# Patient Record
Sex: FEMALE | Race: WHITE | Employment: OTHER | URBAN - METROPOLITAN AREA
[De-identification: names, ages, dates, MRNs, and addresses within clinical notes are randomized per-mention and may not be internally consistent; named-entity substitution may affect disease eponyms.]

---

## 2017-05-11 DIAGNOSIS — H33.311 RETINAL TEAR OF RIGHT EYE: ICD-10-CM

## 2017-05-11 DIAGNOSIS — N20.0 KIDNEY STONES: ICD-10-CM

## 2017-05-11 DIAGNOSIS — I51.9: ICD-10-CM

## 2017-05-11 DIAGNOSIS — T14.8XXA HEMATOMA: ICD-10-CM

## 2017-08-08 PROBLEM — H26.9 BILATERAL CATARACTS: Status: ACTIVE | Noted: 2017-08-08

## 2017-08-08 PROBLEM — E87.5 HYPERKALEMIA: Status: ACTIVE | Noted: 2017-08-08

## 2017-08-08 PROBLEM — F32.A DEPRESSION: Status: ACTIVE | Noted: 2017-08-08

## 2017-08-08 PROBLEM — I48.0 PAF (PAROXYSMAL ATRIAL FIBRILLATION) (HCC): Status: ACTIVE | Noted: 2017-08-08

## 2017-08-08 PROBLEM — E66.01 MORBID OBESITY WITH BODY MASS INDEX OF 40.0-49.9 (HCC): Status: ACTIVE | Noted: 2017-08-08

## 2017-08-08 PROBLEM — Z80.0 FAMILY HISTORY OF COLON CANCER: Status: ACTIVE | Noted: 2017-08-08

## 2017-08-08 PROBLEM — K22.2 GERD WITH STRICTURE: Status: ACTIVE | Noted: 2017-08-08

## 2017-08-08 PROBLEM — M19.90 DJD (DEGENERATIVE JOINT DISEASE): Status: ACTIVE | Noted: 2017-08-08

## 2017-08-08 PROBLEM — E78.5 HYPERLIPEMIA: Status: ACTIVE | Noted: 2017-08-08

## 2017-08-08 PROBLEM — N32.81 OAB (OVERACTIVE BLADDER): Status: ACTIVE | Noted: 2017-08-08

## 2017-08-08 PROBLEM — I51.9 ORGANIC CARDIAC DISEASE: Status: ACTIVE | Noted: 2017-08-08

## 2017-08-08 PROBLEM — K21.9 GERD WITH STRICTURE: Status: ACTIVE | Noted: 2017-08-08

## 2017-08-08 PROBLEM — N20.0 KIDNEY STONES: Status: ACTIVE | Noted: 2017-08-08

## 2017-08-08 PROBLEM — R73.09 IMPAIRED GLUCOSE TOLERANCE TEST: Status: ACTIVE | Noted: 2017-08-08

## 2017-08-08 PROBLEM — I25.10 CAD (CORONARY ARTERY DISEASE): Status: ACTIVE | Noted: 2017-08-08

## 2017-08-08 PROBLEM — B02.9 HERPES ZOSTER: Status: ACTIVE | Noted: 2017-08-08

## 2017-08-08 PROBLEM — H33.311 RETINAL TEAR OF RIGHT EYE: Status: ACTIVE | Noted: 2017-08-08

## 2017-08-08 PROBLEM — L65.9 HAIR LOSS: Status: ACTIVE | Noted: 2017-08-08

## 2017-08-08 PROBLEM — E55.9 VITAMIN D DEFICIENCY: Status: ACTIVE | Noted: 2017-08-08

## 2017-08-08 PROBLEM — T14.8XXA HEMATOMA: Status: ACTIVE | Noted: 2017-08-08

## 2017-08-08 PROBLEM — R60.0 EDEMA EXTREMITIES: Status: ACTIVE | Noted: 2017-08-08

## 2017-08-08 RX ORDER — BISMUTH SUBSALICYLATE 262 MG
2 TABLET,CHEWABLE ORAL DAILY
COMMUNITY

## 2017-08-08 RX ORDER — BUMETANIDE 1 MG/1
1 TABLET ORAL DAILY
COMMUNITY
End: 2017-11-09

## 2017-08-08 RX ORDER — NALTREXONE HYDROCHLORIDE 50 MG/1
50 TABLET, FILM COATED ORAL DAILY
COMMUNITY
End: 2017-11-09

## 2017-08-08 RX ORDER — BUPROPION HYDROCHLORIDE 150 MG/1
150 TABLET, EXTENDED RELEASE ORAL 2 TIMES DAILY
COMMUNITY
End: 2017-10-10 | Stop reason: SDUPTHER

## 2017-08-08 RX ORDER — LANOLIN ALCOHOL/MO/W.PET/CERES
500 CREAM (GRAM) TOPICAL DAILY
COMMUNITY
End: 2017-11-09

## 2017-08-10 ENCOUNTER — APPOINTMENT (OUTPATIENT)
Dept: INTERNAL MEDICINE CLINIC | Age: 67
End: 2017-08-10

## 2017-08-16 PROBLEM — G47.33 OSA ON CPAP: Status: ACTIVE | Noted: 2017-08-16

## 2017-08-16 PROBLEM — Z99.89 OSA ON CPAP: Status: ACTIVE | Noted: 2017-08-16

## 2017-08-16 PROBLEM — E87.5 HYPERKALEMIA: Status: RESOLVED | Noted: 2017-08-08 | Resolved: 2017-08-16

## 2017-08-16 PROBLEM — N18.2 CKD (CHRONIC KIDNEY DISEASE) STAGE 2, GFR 60-89 ML/MIN: Status: ACTIVE | Noted: 2017-08-16

## 2017-09-11 RX ORDER — SIMVASTATIN 20 MG/1
TABLET, FILM COATED ORAL
Qty: 90 TAB | Refills: 2 | Status: SHIPPED | OUTPATIENT
Start: 2017-09-11 | End: 2018-06-07 | Stop reason: SDUPTHER

## 2017-10-10 RX ORDER — BUPROPION HYDROCHLORIDE 150 MG/1
TABLET, EXTENDED RELEASE ORAL
Qty: 60 TAB | Refills: 0 | Status: SHIPPED | OUTPATIENT
Start: 2017-10-10 | End: 2017-11-07 | Stop reason: SDUPTHER

## 2017-11-02 ENCOUNTER — LAB ONLY (OUTPATIENT)
Dept: INTERNAL MEDICINE CLINIC | Age: 67
End: 2017-11-02

## 2017-11-02 DIAGNOSIS — Z20.5 EXPOSURE TO HEPATITIS C: ICD-10-CM

## 2017-11-02 DIAGNOSIS — Z00.00 ROUTINE GENERAL MEDICAL EXAMINATION AT A HEALTH CARE FACILITY: Primary | ICD-10-CM

## 2017-11-02 DIAGNOSIS — R73.09 IMPAIRED GLUCOSE TOLERANCE TEST: ICD-10-CM

## 2017-11-02 DIAGNOSIS — E78.5 HYPERLIPIDEMIA, UNSPECIFIED HYPERLIPIDEMIA TYPE: ICD-10-CM

## 2017-11-02 LAB
ALBUMIN SERPL-MCNC: 3.9 G/DL (ref 3.9–5.4)
ALKALINE PHOS POC: 98 U/L (ref 38–126)
ALT SERPL-CCNC: 37 U/L (ref 9–52)
AST SERPL-CCNC: 22 U/L (ref 14–36)
BACTERIA UA POCT, BACTPOCT: NORMAL
BILIRUB UR QL STRIP: NEGATIVE
BUN BLD-MCNC: 27 MG/DL (ref 7–17)
CALCIUM BLD-MCNC: 9.5 MG/DL (ref 8.4–10.2)
CASTS UA POCT: NORMAL
CHLORIDE BLD-SCNC: 106 MMOL/L (ref 98–107)
CHOLEST SERPL-MCNC: 194 MG/DL (ref 0–200)
CK (CPK) POC: 42 U/L (ref 30–135)
CLUE CELLS, CLUEPOCT: NEGATIVE
CO2 POC: 27 MMOL/L (ref 22–32)
CREAT BLD-MCNC: 0.9 MG/DL (ref 0.7–1.2)
CRYSTALS UA POCT, CRYSPOCT: NEGATIVE
EGFR (POC): 65.7
EPITHELIAL CELLS POCT: NEGATIVE
GLUCOSE POC: 100 MG/DL (ref 65–105)
GLUCOSE UR-MCNC: NEGATIVE MG/DL
GRAN# POC: 4.6 K/UL (ref 2–7.8)
GRAN% POC: 68.9 % (ref 37–92)
HBA1C MFR BLD HPLC: 6 % (ref 4.5–5.7)
HCT VFR BLD CALC: 41.9 % (ref 37–51)
HDLC SERPL-MCNC: 60 MG/DL (ref 35–130)
HGB BLD-MCNC: 13.6 G/DL (ref 12–18)
IRON POC: 119 UG/DL (ref 37–170)
IRON SATURATION POC: 31 % (ref 15–55)
KETONES P FAST UR STRIP-MCNC: NEGATIVE MG/DL
LDL CHOLESTEROL POC: 89.2 MG/DL (ref 0–130)
LY# POC: 1.7 K/UL (ref 0.6–4.1)
LY% POC: 26.4 % (ref 10–58.5)
MCH RBC QN: 30 PG (ref 26–32)
MCHC RBC-ENTMCNC: 32.4 G/DL (ref 30–36)
MCV RBC: 93 FL (ref 80–97)
MID #, POC: 0.3 K/UL (ref 0–1.8)
MID% POC: 4.7 % (ref 0.1–24)
MUCUS UA POCT, MUCPOCT: NORMAL
PH UR STRIP: 6 [PH] (ref 5–7)
PLATELET # BLD: 266 K/UL (ref 140–440)
POTASSIUM SERPL-SCNC: 4.8 MMOL/L (ref 3.6–5)
PROT SERPL-MCNC: 6.9 G/DL (ref 6.3–8.2)
PROT UR QL STRIP: NEGATIVE MG/DL
RBC # BLD: 4.52 M/UL (ref 4.2–6.3)
RBC UA POCT, RBCPOCT: 0
SODIUM SERPL-SCNC: 144 MMOL/L (ref 137–145)
SP GR UR STRIP: 1.02 (ref 1.01–1.02)
TCHOL/HDL RATIO (POC): 3.2 (ref 0–4)
TIBC POC: 387 UG/DL (ref 265–497)
TOTAL BILIRUBIN POC: 0.4 MG/DL (ref 0.2–1.3)
TRICH UA POCT, TRICHPOC: NEGATIVE
TRIGL SERPL-MCNC: 224 MG/DL (ref 0–200)
TSH BLD-ACNC: 1.75 UIU/ML (ref 0.4–4.2)
UA UROBILINOGEN AMB POC: NORMAL (ref 0.2–1)
URINALYSIS CLARITY POC: CLEAR
URINALYSIS COLOR POC: NORMAL
URINE BLOOD POC: NEGATIVE
URINE CULT COMMENT, POCT: NORMAL
URINE LEUKOCYTES POC: NEGATIVE
URINE NITRITES POC: NEGATIVE
VITAMIN D POC: 52.1 NG/ML (ref 30–96)
VLDLC SERPL CALC-MCNC: 44.8 MG/DL
WBC # BLD: 6.6 K/UL (ref 4.1–10.9)
WBC UA POCT, WBCPOCT: 0
YEAST UA POCT, YEASTPOC: NEGATIVE

## 2017-11-03 LAB — HCV AB S/CO SERPL IA: <0.1 S/CO RATIO (ref 0–0.9)

## 2017-11-08 PROBLEM — R73.02 IGT (IMPAIRED GLUCOSE TOLERANCE): Status: ACTIVE | Noted: 2017-08-08

## 2017-11-08 PROBLEM — I44.7 LBBB (LEFT BUNDLE BRANCH BLOCK): Status: ACTIVE | Noted: 2017-11-08

## 2017-11-08 RX ORDER — BUPROPION HYDROCHLORIDE 150 MG/1
TABLET, EXTENDED RELEASE ORAL
Qty: 60 TAB | Refills: 0 | Status: SHIPPED | OUTPATIENT
Start: 2017-11-08 | End: 2017-11-09 | Stop reason: SDUPTHER

## 2017-11-09 ENCOUNTER — OFFICE VISIT (OUTPATIENT)
Dept: INTERNAL MEDICINE CLINIC | Age: 67
End: 2017-11-09

## 2017-11-09 VITALS
RESPIRATION RATE: 14 BRPM | TEMPERATURE: 98.2 F | HEART RATE: 78 BPM | SYSTOLIC BLOOD PRESSURE: 138 MMHG | OXYGEN SATURATION: 96 % | WEIGHT: 293 LBS | DIASTOLIC BLOOD PRESSURE: 76 MMHG | HEIGHT: 66 IN | BODY MASS INDEX: 47.09 KG/M2

## 2017-11-09 DIAGNOSIS — Z99.89 OSA ON CPAP: ICD-10-CM

## 2017-11-09 DIAGNOSIS — G47.33 OSA ON CPAP: ICD-10-CM

## 2017-11-09 DIAGNOSIS — N18.2 CKD (CHRONIC KIDNEY DISEASE) STAGE 2, GFR 60-89 ML/MIN: ICD-10-CM

## 2017-11-09 DIAGNOSIS — F32.9 REACTIVE DEPRESSION: ICD-10-CM

## 2017-11-09 DIAGNOSIS — I10 ESSENTIAL HYPERTENSION: ICD-10-CM

## 2017-11-09 DIAGNOSIS — R73.02 IGT (IMPAIRED GLUCOSE TOLERANCE): ICD-10-CM

## 2017-11-09 DIAGNOSIS — E78.5 HYPERLIPIDEMIA, UNSPECIFIED HYPERLIPIDEMIA TYPE: ICD-10-CM

## 2017-11-09 DIAGNOSIS — I44.7 LBBB (LEFT BUNDLE BRANCH BLOCK): ICD-10-CM

## 2017-11-09 DIAGNOSIS — E66.01 MORBID OBESITY WITH BMI OF 50.0-59.9, ADULT (HCC): Primary | ICD-10-CM

## 2017-11-09 RX ORDER — BUPROPION HYDROCHLORIDE 150 MG/1
150 TABLET, EXTENDED RELEASE ORAL 2 TIMES DAILY
Qty: 180 TAB | Refills: 1 | Status: SHIPPED | OUTPATIENT
Start: 2017-11-09 | End: 2018-06-07 | Stop reason: SDUPTHER

## 2017-11-09 RX ORDER — DIPHENHYDRAMINE HCL 25 MG
1 TABLET,DISINTEGRATING ORAL DAILY
COMMUNITY

## 2017-11-09 RX ORDER — LANOLIN ALCOHOL/MO/W.PET/CERES
500 CREAM (GRAM) TOPICAL DAILY
COMMUNITY

## 2017-11-09 NOTE — PROGRESS NOTES
Patient declines flu shot today. Reviewed record in preparation for visit and have obtained necessary documentation. Identified pt with two pt identifiers(name and ). Chief Complaint   Patient presents with    Complete Physical        Coordination of Care Questionnaire:  :     1) Have you been to an emergency room, urgent care clinic since your last visit? No    Hospitalized since your last visit? No            2) Have you seen or consulted any other health care providers outside of [address] [place] [address] since your last visit?  No

## 2017-11-09 NOTE — PROGRESS NOTES
Ms. Mortensen Son is a 75year old   female who comes to the office today for annual comprehensive personal healthcare examination. History of Present Illness: This patient has multiple medical problems. These include:  1. Hypertension. 2. Hyperlipidemia. 3. Morbid obesity, status post laparoscopic sleeve gastrectomy in June, 2016.  4. Obstructive sleep apnea on CPAP. 5. History of depression and OCD. 6. OHD with left bundle branch block. 7. Chronic kidney disease, stage 2.  8. CAD by EBT heart scan. 9. Family history of colon cancer. 10. History of OAB and issues with urge incontinence, improved recently. 11. Dysphagia associated with esophageal stricture, status post dilatation. 12. GERD. 13. History of right renal stone found on a workup for microscopic hematuria. Ultimately she underwent right ureteroscopy and laser lithotripsy with resolution of the problem. 14. Lower extremity edema associated with obesity, improved. 15. DJD, particularly involving knees and feet. 16. History of paroxysmal atrial fibrillation. 17. History of infected hematoma from a tractor accident, status post multiple surgical procedures and debridements. She developed significant scarring in her right groin area, but this has fully healed. 18. History of herpes zoster. 19. History of hair loss. 20. History of minimal cataracts. 21. History of retinal tear on the right, status post laser surgery. 22. History of vitamin D deficiency. At the time of the visit she was doing reasonably well. She had stalled in terms of her weight loss, however. She denied new cardiorespiratory, GI or  symptoms. Past Medical History:  Otherwise remarkable for supracervical hysterectomy and BSO and an appendectomy. Allergies:  None known. Current Medications:  1. Lisinopril 20 mg daily. 2. Aspirin 81 mg daily. 3. Multivitamins, two a day. 4. Vitamin B12 500 mcg daily.   5. Bupropion  mg twice a day.  6. Calcium and vitamin D daily. 7. Simvastatin 20 mg daily. 8. Metoprolol 50 mg twice a day. 9. Diclofenac 75 mg twice a day as needed. 10. Vitamin D 1,000 I U daily. Social History:  She is . She is retired. She does not smoke. She does not drink alcohol. She drinks 1-2 glasses of iced tea a day. Family History: Mother had sleep apnea, hypertension, Alzheimer's type dementia. She  at age 80. Father had colon cancer and  at age 79. Review of Systems:  CONSTITUTIONAL:  She denies fever, weight loss, sweats or fatigue. HEENT:  No blurred or double vision, headache or dizziness. No difficulty with swallowing, taste, speech or smell. RESPIRATORY:  No cough, wheezing or shortness of breath. No sputum production. CARDIAC:  Denies chest pain, palpitations, unexplained indigestion, syncope, edema, PND or orthopnea. GI:  No changes in bowel movements, no abdominal pain, no bloating, anorexia, nausea, vomiting or heartburn. BREASTS:  She does monthly self-breast examination. No masses have been felt. No nipple discharge noted. GYN:  Denies vaginal discharge or unexpected bleeding. :  Notes some urge incontinence. EXTREMITIES:  Bilateral knee pain, stiffness or swelling. Some intermittent swelling of the lower extremities as well. SKIN:  No recent rashes or mole changes. NEUROLOGICAL:  No numbness, tingling, burning paresthesias or loss of motor strength. No syncope, dizziness, frequent headaches or memory loss. PSYCH:  Mild depression, improved. Physical Examination:  GENERAL:  Obese white female, no acute distress. VITAL SIGNS:  BP: 138/76. P: 78 and regular. R: 14.  T: 98.2. HT: 5'6\". WT: 312 lbs. BMI: 50.4, (previously 56.7). VISION:  Deferred to ophthalmologist.  HEARING:  Pratik Neither. HEENT:  Ears:  The TMs and ear canals were clear. Eyes:  The pupillary responses were normal.  Extraocular muscle function intact. Lids and conjunctivae not injected. Fundoscopic exam revealed sharp disc margins. Pharynx:  Clear with teeth in good repair. No masses were noted. NECK:  Supple without thyromegaly or adenopathy. No JVD noted. No carotid bruits. LUNGS:  Clear to auscultation and percussion. CARDIAC:  Regular rate and rhythm without murmur. PMI not displaced. No gallop, rub or click. ABDOMEN:  Obese, soft, non-tender without palpable organomegaly or mass. No pulsatile mass was felt and no bruit was heard. Bowel sounds were active. Multiple well healed incisions. BREASTS:  Both symmetric. No palpable masses felt. No skin retractions noted. No nipple discharge evident. GYN:  Refused. RECTAL EXAM:  Refused. EXTREMITIES:  No clubbing, cyanosis or edema. Right groin has extensive scarring from the previous infected hematoma and extensive debridements. There is a concave depression in that area as well. Lower extremities reveal bilateral venous varicosities and moderate stasis changes. PULSES:  Dorsalis pedis and posterior tibial pulses felt without difficulty. SKIN:  No rash or unusual mole changes noted. LYMPH NODES:  None felt in the cervical, supraclavicular, axillary or inguinal region. NEUROLOGICAL:  Cranial nerves II-XII grossly intact. Motor strength 5/5. DTRs 2+ and symmetric. Station and gait normal.    Laboratory:  Hemoglobin is 13.6. White blood count is 6,600. Blood sugar is 100. Liver and kidney function are normal.  Electrolytes are normal.  Iron is 119. Hepatitis C antibody negative. TSH is 1.75. Urinalysis is clear. Vitamin D level is 52. 1. A1c 6.0. CK 42. Lipid profile reveals a total cholesterol of 194, triglycerides of 224, HDL cholesterol of 60 and an LDL of 89.2. In addition she brings in lab work obtained from Dr. William Love office, including a normal copper of 121, normal zinc of 74, normal folic acid greater than 20 and normal B12 of 1,581. Ferritin was also normal at 171. Parathyroid hormone was normal at 32. Prealbumin was normal at 34. Vitamin A normal at 91. Vitamin B1 normal at 227.5. Health Maintenance Issues and Ancillary Studies:  1. TDAP (pertussis and tetanus) vaccine was given in May, 2014. 2. Pneumovax 23 (PPSV23) was given in May, 2014. 3. Seasonal influenza vaccine was refused. 4. Shingles vaccine needs to be obtained from the pharmacy. 5. Prevnar 13 (PCV13) was given in October, 2015.  6. Last mammogram was negative in December, 2015 and is overdue. She said she would schedule one. 7. Lexiscan stress testing was negative in September, 2015.  8. Bone density in May, 2014 revealed a T score of -0.6 with a FRAX score of 6.2/0.3. We can probably repeat this at the five year interval.  9. Upper endoscopy in August, 2014 revealed a stricture which was dilated. Biopsies were negative for Kohli's esophagus and H. Pylori. 10. Colonoscopy was negative in August, 2014, (five year follow up for the family history of colon cancer). 11. EBT heart scan revealed a calcium score of 73 in March, 2012. 12. Echocardiogram was normal in April, 2008. 13. Eye examination in April, 2011 revealed early cataracts with residual scarring from laser treatment of the retinal tear. She's not have an eye examination since and I recommended she get one. 14. Last pelvic was negative in December, 2007 and she has declined any further. 15. CT scan of the abdomen in May, 2013 revealed the 9 mm stone in the right kidney. This was done at the urology center. 16. EKG in the office revealed left bundle branch block. 17. Pulmonary function studies revealed restrictive lung disease. 18. Health screening assessments were essentially normal.    Impression:  1. Hypertension. 2. Dyslipidemia. 3. Obstructive sleep apnea. 4. Morbid obesity, status post laparoscopic sleeve gastrectomy. 5. Depression and OCD. 6. CAD by EBT heart scan.   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 8. CKD stage 2.  9. GERD with history of stricture and dysphagia, status post dilatation. 10. Family history of colon cancer. 11. History of OAB and urge incontinence, less symptomatic recently. 12. DJD. 13. History of infected hematoma of the right groin, status post multiple surgical procedures. 14. Impaired glucose tolerance. 15. Vitamin D deficiency. 16. History of hair loss. 17. History of kidney stones. 18. Status post LALY, BSO, appendectomy and other surgeries. Plan:  1. Continue present medication. 2. Resume a more prudent diet and activity level and try to lose further weight. 3. Recheck here six months time per her wishes rather than three months. 4. She appears appropriately treated for her ten year coronary heart disease risk. 5. Five year breast cancer risk was calculated to be 1.4% versus the average for age group of 1.7%. Lifetime risk was calculated to be 7% versus 8% average for age group. 6. Ten year risk for major osteoporotic fracture was calculated to be 6.2% with risk for hip fracture 0.3%. 7. She needs to get mammograms and Zostavax.

## 2017-12-14 RX ORDER — LISINOPRIL 40 MG/1
TABLET ORAL
Qty: 90 TAB | Refills: 2 | Status: SHIPPED | OUTPATIENT
Start: 2017-12-14 | End: 2018-10-02

## 2018-01-11 ENCOUNTER — HOSPITAL ENCOUNTER (OUTPATIENT)
Dept: MAMMOGRAPHY | Age: 68
Discharge: HOME | End: 2018-01-11
Attending: INTERNAL MEDICINE
Payer: MEDICARE

## 2018-01-11 DIAGNOSIS — Z12.39 BREAST CANCER SCREENING: ICD-10-CM

## 2018-01-11 PROCEDURE — 77067 SCR MAMMO BI INCL CAD: CPT

## 2018-02-26 RX ORDER — DICLOFENAC SODIUM 75 MG/1
TABLET, DELAYED RELEASE ORAL
Qty: 180 TAB | Refills: 2 | Status: SHIPPED | OUTPATIENT
Start: 2018-02-26 | End: 2018-11-06

## 2018-03-09 ENCOUNTER — OFFICE VISIT (OUTPATIENT)
Dept: INTERNAL MEDICINE CLINIC | Age: 68
End: 2018-03-09

## 2018-03-09 VITALS
SYSTOLIC BLOOD PRESSURE: 139 MMHG | HEART RATE: 67 BPM | HEIGHT: 66 IN | DIASTOLIC BLOOD PRESSURE: 70 MMHG | OXYGEN SATURATION: 98 % | TEMPERATURE: 97.7 F
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
## 2018-03-09 RX ORDER — AMOXICILLIN AND CLAVULANATE POTASSIUM 875; 125 MG/1; MG/1
1 TABLET, FILM COATED ORAL EVERY 12 HOURS
Qty: 14 TAB | Refills: 0 | Status: SHIPPED | OUTPATIENT
Start: 2018-03-09 | End: 2018-10-01

## 2018-03-09 NOTE — PROGRESS NOTES
Subjective:     Ms. Lissette Eden comes to the office today having been scratched by her dog across her right shin. The duclaw opened up a linear skin tear with the central portion having been mostly abraded with no wound edges to approximate. The lateral aspect of the wound was approximated with steri-strips as it was not a deep scratch and I did not think it would accept sutures very well, particularly with her large legs and chronic edema. Vaseline dressing was applied after the Steri-Strips and antibiotic ointment as well. Dry sterile dressing was then applied to this for protection. She was instructed in wound care to consist of daily cleansing with normal saline or soap and water, followed by the application of antibiotic ointment and dry dressing. She will leave the Steri-Strips on until they fall off. If she has persistent bleeding she can put more of a compression bandage on it.   She will return if she feels it is worsening, but is not required to come back provided it continues to heal.        Patient Active Problem List    Diagnosis Date Noted    Hypertension      Priority: 1 - One    Hyperlipemia 08/08/2017     Priority: 1 - One    IGT (impaired glucose tolerance) 08/08/2017     Priority: 2 - Two    Morbid obesity with BMI of 50.0-59.9, adult ([facility] 08/08/2017     Priority: 2 - Two    LBBB (left bundle branch block) 11/08/2017     Priority: 3 - Three    MUMTAZ on CPAP 08/16/2017     Priority: 3 - Three    CKD (chronic kidney disease) stage 2, GFR 60-89 ml/min 08/16/2017     Priority: 3 - Three    OAB (overactive bladder) 08/08/2017     Priority: 3 - Three    PAF (paroxysmal atrial fibrillation) ([facility] 08/08/2017     Priority: 3 - Three    CAD (coronary artery disease) 08/08/2017     Priority: 3 - Three    Organic cardiac disease 08/08/2017     Priority: 3 - Three    DJD (degenerative joint disease) 08/08/2017     Priority: 3 - Three    GERD with stricture 08/08/2017     Priority: 3 - Three    Depression 08/08/2017     Priority: 3 - Three    Kidney stones 08/08/2017     Priority: 4 - Four    Family history of colon cancer 08/08/2017     Priority: 4 - Four    Edema extremities 08/08/2017     Priority: 4 - Four    Vitamin D deficiency 08/08/2017     Priority: 4 - Four    Retinal tear of right eye 08/08/2017     Priority: 5 - Five    Bilateral cataracts 08/08/2017     Priority: 5 - Five    Hematoma 08/08/2017     Priority: 5 - Five    Hair loss 08/08/2017     Priority: 5 - Five    Herpes zoster 08/08/2017     Priority: 5 - Five     Past Surgical History:   Procedure Laterality Date    COLORECTAL SCRN; HI RISK IND  8/11/2014         DILATE ESOPHAGUS  8/11/2014         HX APPENDECTOMY      HX HYSTERECTOMY      HX ORTHOPAEDIC Right     thigh/hip hematoma-4 times    HX SALPINGO-OOPHORECTOMY      HX UROLOGICAL  3/6/14    CYSTOSCOPY, RIGHT URETEROSCOPY WITH HIGH POWERED HOLMIUM LASER, WITH RIGHT URETERAL STENT PLACEMENT     IN EGD TRANSORAL BIOPSY SINGLE/MULTIPLE  8/11/2014           Social History   Substance Use Topics    Smoking status: Never Smoker    Smokeless tobacco: Never Used    Alcohol use No     No Known Allergies  Outpatient Prescriptions Marked as Taking for the 3/9/18 encounter (Office Visit) with Marilee Childers MD   Medication Sig Dispense Refill    amoxicillin-clavulanate (AUGMENTIN) 875-125 mg per tablet Take 1 Tab by mouth every twelve (12) hours. 14 Tab 0    diclofenac EC (VOLTAREN) 75 mg EC tablet take 1 tablet by mouth twice a day if needed 180 Tab 2    lisinopril (PRINIVIL, ZESTRIL) 40 mg tablet take 1 tablet by mouth once daily for blood pressure 90 Tab 2    calcium carbonate-vitamin D3 (CALTRATE WITH VITAMIN D3) 600 mg(1,500mg) -800 unit tab Take 1 Tab by mouth daily.  cyanocobalamin (VITAMIN B-12) 500 mcg tablet Take 500 mcg by mouth daily.  buPROPion SR (WELLBUTRIN SR) 150 mg SR tablet Take 1 Tab by mouth two (2) times a day.  180 Tab 1    simvastatin (ZOCOR) 20 mg tablet take 1 by mouth every evening for cholesterol 90 Tab 2    multivitamin (ONE A DAY) tablet Take 2 Tabs by mouth daily.  metoprolol (LOPRESSOR) 50 mg tablet Take 50 mg by mouth two (2) times a day.  cholecalciferol (VITAMIN D3) 1,000 unit tablet Take 1,000 Units by mouth daily.  aspirin delayed-release 81 mg tablet Take 81 mg by mouth daily. Review of Systems:  GEN: no weight loss, weight gain, fatigue or night sweats  CV: no PND, orthopnea, or palpitations  Resp: no dyspnea on exertion, no cough  Abd: no nausea, vomiting or diarrhea  Endocrine: no hair loss, excessive thirst or polyuria  Neurological ROS: no TIA or stroke symptoms      Objective:     Visit Vitals    /70    Pulse 67    Temp 97.7 °F (36.5 °C) (Oral)    Ht 5' 6\" (1.676 m)    SpO2 98%     General:   alert, cooperative and no distress   Eyes: conjunctivae/sclerae clear. PERRL   Mouth:  No oral lesions, no pharyngeal erythema, no exudates   Skin: Linear skin tear, dog scratch mid shin right. 10 cm and clean   Heart: S1 and S2 normal,no murmurs noted    Lungs: Clear to auscultation bilaterally, no increased work of breathing   Abdomen: Soft, nontender. Normal bowel sounds   Extremities: No edema or cyanosis                              Wound cleaned and steri-stripped. Tetanus UTD. Instructions in wound care given. Assessment/Plan:       ICD-10-CM ICD-9-CM    1. Dog scratch W54. 8XXA 919.0      E906.8        Orders Placed This Encounter    amoxicillin-clavulanate (AUGMENTIN) 875-125 mg per tablet     Sig: Take 1 Tab by mouth every twelve (12) hours. Dispense:  14 Tab     Refill:  0     Cleanse wound with NS/ soap and water and apply triple abx ointment daily    Follow-up Disposition:  Return for If sxs worsen or fail to improve; or as scheduled.

## 2018-03-09 NOTE — PROGRESS NOTES
Reviewed record in preparation for visit and have obtained necessary documentation. Identified pt with two pt identifiers(name and ). Chief Complaint   Patient presents with    Animal Bite        Coordination of Care Questionnaire:  :     1) Have you been to an emergency room, urgent care clinic since your last visit? No     Hospitalized since your last visit? No               2) Have you seen or consulted any other health care providers outside of [address] [place] [address] since your last visit? No    Patient states that her dog's toe nail cut her right lowe leg.

## 2018-05-15 RX ORDER — METOPROLOL TARTRATE 50 MG/1
TABLET ORAL
Qty: 180 TAB | Refills: 4 | Status: SHIPPED | OUTPATIENT
Start: 2018-05-15 | End: 2019-05-16 | Stop reason: SDUPTHER

## 2018-06-07 RX ORDER — BUPROPION HYDROCHLORIDE 150 MG/1
TABLET, EXTENDED RELEASE ORAL
Qty: 180 TAB | Refills: 1 | Status: SHIPPED | OUTPATIENT
Start: 2018-06-07 | End: 2018-12-02 | Stop reason: SDUPTHER

## 2018-06-07 RX ORDER — SIMVASTATIN 20 MG/1
TABLET, FILM COATED ORAL
Qty: 90 TAB | Refills: 2 | Status: SHIPPED | OUTPATIENT
Start: 2018-06-07 | End: 2019-03-12 | Stop reason: SDUPTHER

## 2018-09-28 ENCOUNTER — LAB ONLY (OUTPATIENT)
Dept: INTERNAL MEDICINE CLINIC | Age: 68
End: 2018-09-28

## 2018-09-28 DIAGNOSIS — R73.02 IMPAIRED GLUCOSE TOLERANCE: ICD-10-CM

## 2018-09-28 DIAGNOSIS — E78.5 HYPERLIPIDEMIA, UNSPECIFIED HYPERLIPIDEMIA TYPE: ICD-10-CM

## 2018-09-28 DIAGNOSIS — N39.0 URINARY TRACT INFECTION WITHOUT HEMATURIA, SITE UNSPECIFIED: ICD-10-CM

## 2018-09-28 DIAGNOSIS — Z00.00 ANNUAL PHYSICAL EXAM: Primary | ICD-10-CM

## 2018-09-28 LAB
ALBUMIN SERPL-MCNC: 3.7 G/DL (ref 3.9–5.4)
ALKALINE PHOS POC: 91 U/L (ref 38–126)
ALT SERPL-CCNC: 33 U/L (ref 9–52)
AMORPHOUS CRYSTALS,AMORC: NORMAL
AST SERPL-CCNC: 23 U/L (ref 14–36)
BACTERIA UA POCT, BACTPOCT: NORMAL
BILIRUB UR QL STRIP: NORMAL
BUN BLD-MCNC: 31 MG/DL (ref 7–17)
CALCIUM BLD-MCNC: 9.7 MG/DL (ref 8.4–10.2)
CASTS,CASTS: NORMAL
CHLORIDE BLD-SCNC: 105 MMOL/L (ref 98–107)
CHOLEST SERPL-MCNC: 151 MG/DL (ref 0–200)
CK (CPK) POC: 47 U/L (ref 30–135)
CO2 POC: 29 MMOL/L (ref 22–32)
CREAT BLD-MCNC: 1 MG/DL (ref 0.7–1.2)
CRYSTALS UA POCT, CRYSPOCT: NORMAL
EGFR (POC): 57.4
EPITHELIAL CELLS: NORMAL
GLUCOSE POC: 96 MG/DL (ref 65–105)
GLUCOSE UR-MCNC: NORMAL MG/DL
GRAN# POC: 5.9 K/UL (ref 2–7.8)
GRAN% POC: 82.7 % (ref 37–92)
HCT VFR BLD CALC: 39.6 % (ref 37–51)
HDLC SERPL-MCNC: 67 MG/DL (ref 35–130)
HGB BLD-MCNC: 13 G/DL (ref 12–18)
IRON POC: 72 UG/DL (ref 37–170)
IRON SATURATION POC: 25 % (ref 15–55)
KETONES P FAST UR STRIP-MCNC: NORMAL MG/DL
LDL CHOLESTEROL POC: 52.6 MG/DL (ref 0–130)
LY# POC: 0.9 K/UL (ref 0.6–4.1)
LY% POC: 13.5 % (ref 10–58.5)
MCH RBC QN: 30 PG (ref 26–32)
MCHC RBC-ENTMCNC: 32.8 G/DL (ref 30–36)
MCV RBC: 91 FL (ref 80–97)
MID #, POC: 0.2 K/UL (ref 0–1.8)
MID% POC: 3.8 % (ref 0.1–24)
OTHER,OTHU: NORMAL
PH UR STRIP: NORMAL [PH] (ref 5–7)
PLATELET # BLD: 251 K/UL (ref 140–440)
POTASSIUM SERPL-SCNC: 4.9 MMOL/L (ref 3.6–5)
PROT SERPL-MCNC: 6.6 G/DL (ref 6.3–8.2)
PROT UR QL STRIP: NORMAL
RBC # BLD: 4.33 M/UL (ref 4.2–6.3)
RBC #/AREA URNS HPF: NORMAL /[HPF]
SODIUM SERPL-SCNC: 144 MMOL/L (ref 137–145)
SP GR UR STRIP: NORMAL (ref 1.01–1.02)
TCHOL/HDL RATIO (POC): 2.3 (ref 0–4)
TIBC POC: 287 UG/DL (ref 265–497)
TOTAL BILIRUBIN POC: 0.7 MG/DL (ref 0.2–1.3)
TRIGL SERPL-MCNC: 157 MG/DL (ref 0–200)
TSH BLD-ACNC: 1.48 UIU/ML (ref 0.4–4.2)
UA UROBILINOGEN AMB POC: NORMAL (ref 0.2–1)
URINALYSIS CLARITY POC: NORMAL
URINALYSIS COLOR POC: NORMAL
URINE BLOOD POC: NORMAL
URINE LEUKOCYTES POC: NORMAL
URINE NITRITES POC: NORMAL
VITAMIN D POC: 54.7 NG/ML (ref 30–96)
VLDLC SERPL CALC-MCNC: 31.4 MG/DL
WBC # BLD: 7 K/UL (ref 4.1–10.9)
WBC URNS QL MICRO: NORMAL

## 2018-10-01 PROBLEM — Z00.00 PE (PHYSICAL EXAM), ANNUAL: Status: ACTIVE | Noted: 2018-10-01

## 2018-10-01 LAB — HBA1C MFR BLD HPLC: 5.2 % (ref 4.5–5.7)

## 2018-10-02 ENCOUNTER — OFFICE VISIT (OUTPATIENT)
Dept: INTERNAL MEDICINE CLINIC | Age: 68
End: 2018-10-02

## 2018-10-02 VITALS
HEIGHT: 66 IN | WEIGHT: 293 LBS | RESPIRATION RATE: 16 BRPM | BODY MASS INDEX: 47.09 KG/M2 | HEART RATE: 66 BPM | DIASTOLIC BLOOD PRESSURE: 72 MMHG | TEMPERATURE: 98.1 F | OXYGEN SATURATION: 98 % | SYSTOLIC BLOOD PRESSURE: 141 MMHG

## 2018-10-02 DIAGNOSIS — I25.84 CORONARY ARTERY DISEASE DUE TO CALCIFIED CORONARY LESION: ICD-10-CM

## 2018-10-02 DIAGNOSIS — I10 ESSENTIAL HYPERTENSION: ICD-10-CM

## 2018-10-02 DIAGNOSIS — I48.0 PAF (PAROXYSMAL ATRIAL FIBRILLATION) (HCC): ICD-10-CM

## 2018-10-02 DIAGNOSIS — M19.91 PRIMARY OSTEOARTHRITIS, UNSPECIFIED SITE: ICD-10-CM

## 2018-10-02 DIAGNOSIS — G47.33 OSA ON CPAP: ICD-10-CM

## 2018-10-02 DIAGNOSIS — I44.7 LBBB (LEFT BUNDLE BRANCH BLOCK): ICD-10-CM

## 2018-10-02 DIAGNOSIS — E78.5 HYPERLIPIDEMIA, UNSPECIFIED HYPERLIPIDEMIA TYPE: ICD-10-CM

## 2018-10-02 DIAGNOSIS — I25.10 CORONARY ARTERY DISEASE DUE TO CALCIFIED CORONARY LESION: ICD-10-CM

## 2018-10-02 DIAGNOSIS — F32.9 REACTIVE DEPRESSION: ICD-10-CM

## 2018-10-02 DIAGNOSIS — K21.9 GERD WITH STRICTURE: ICD-10-CM

## 2018-10-02 DIAGNOSIS — H26.9 CATARACT OF BOTH EYES, UNSPECIFIED CATARACT TYPE: ICD-10-CM

## 2018-10-02 DIAGNOSIS — R73.02 IGT (IMPAIRED GLUCOSE TOLERANCE): ICD-10-CM

## 2018-10-02 DIAGNOSIS — I73.9 PVD (PERIPHERAL VASCULAR DISEASE) (HCC): ICD-10-CM

## 2018-10-02 DIAGNOSIS — I87.2 STASIS DERMATITIS OF BOTH LEGS: ICD-10-CM

## 2018-10-02 DIAGNOSIS — N18.2 CKD (CHRONIC KIDNEY DISEASE) STAGE 2, GFR 60-89 ML/MIN: ICD-10-CM

## 2018-10-02 DIAGNOSIS — K22.2 GERD WITH STRICTURE: ICD-10-CM

## 2018-10-02 DIAGNOSIS — E55.9 VITAMIN D DEFICIENCY: ICD-10-CM

## 2018-10-02 DIAGNOSIS — I51.9: ICD-10-CM

## 2018-10-02 DIAGNOSIS — E66.01 MORBID OBESITY WITH BMI OF 50.0-59.9, ADULT (HCC): ICD-10-CM

## 2018-10-02 DIAGNOSIS — Z80.0 FAMILY HISTORY OF COLON CANCER: ICD-10-CM

## 2018-10-02 DIAGNOSIS — Z99.89 OSA ON CPAP: ICD-10-CM

## 2018-10-02 DIAGNOSIS — R60.0 EDEMA EXTREMITIES: ICD-10-CM

## 2018-10-02 DIAGNOSIS — N32.81 OAB (OVERACTIVE BLADDER): ICD-10-CM

## 2018-10-02 DIAGNOSIS — Z00.00 PE (PHYSICAL EXAM), ANNUAL: Primary | ICD-10-CM

## 2018-10-02 PROBLEM — F32.1 MODERATE MAJOR DEPRESSION (HCC): Status: ACTIVE | Noted: 2018-10-02

## 2018-10-02 LAB
BACTERIA UA POCT, BACTPOCT: ABNORMAL
BILIRUB UR QL STRIP: NEGATIVE
CASTS UA POCT: ABNORMAL
CLUE CELLS, CLUEPOCT: NEGATIVE
CRYSTALS UA POCT, CRYSPOCT: NEGATIVE
EPITHELIAL CELLS POCT: ABNORMAL
GLUCOSE UR-MCNC: NEGATIVE MG/DL
KETONES P FAST UR STRIP-MCNC: NEGATIVE MG/DL
MUCUS UA POCT, MUCPOCT: ABNORMAL
PH UR STRIP: 6 [PH] (ref 5–7)
PROT UR QL STRIP: NEGATIVE
RBC UA POCT, RBCPOCT: ABNORMAL
SP GR UR STRIP: 1.02 (ref 1.01–1.02)
TRICH UA POCT, TRICHPOC: NEGATIVE
UA UROBILINOGEN AMB POC: NORMAL (ref 0.2–1)
URINALYSIS CLARITY POC: CLEAR
URINALYSIS COLOR POC: YELLOW
URINE BLOOD POC: NEGATIVE
URINE CULT COMMENT, POCT: ABNORMAL
URINE LEUKOCYTES POC: NEGATIVE
URINE NITRITES POC: NEGATIVE
WBC UA POCT, WBCPOCT: ABNORMAL
YEAST UA POCT, YEASTPOC: NEGATIVE

## 2018-10-02 RX ORDER — LISINOPRIL 20 MG/1
TABLET ORAL DAILY
COMMUNITY
End: 2019-05-15 | Stop reason: SDUPTHER

## 2018-10-02 RX ORDER — TRIAMCINOLONE ACETONIDE 1 MG/G
CREAM TOPICAL 2 TIMES DAILY
Qty: 60 G | Refills: 1 | Status: SHIPPED | OUTPATIENT
Start: 2018-10-02 | End: 2019-02-05 | Stop reason: SDUPTHER

## 2018-10-02 RX ORDER — BUMETANIDE 1 MG/1
2 TABLET ORAL
COMMUNITY
End: 2018-11-13 | Stop reason: SDUPTHER

## 2018-10-02 NOTE — PROGRESS NOTES
Tiera Mcneil is a 70 y.o. female Chief Complaint Patient presents with  Complete Physical  
 
Visit Vitals  /72 (BP 1 Location: Left arm, BP Patient Position: Sitting)  Pulse 66  Temp 98.1 °F (36.7 °C) (Oral)  Resp 16  
 Ht 5' 6\" (1.676 m)  Wt 322 lb 12.8 oz (146.4 kg)  SpO2 98%  BMI 52.1 kg/m2 1. Have you been to the ER, urgent care clinic since your last visit? Hospitalized since your last visit?  no 
 
2. Have you seen or consulted any other health care providers outside of the [address] [place] [address] since your last visit? Include any pap smears or colon screening. No 
 
 
Patient  Refused a flu shot Patient refused a hearing test.
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
## 2018-10-04 LAB
BACTERIA UR CULT: ABNORMAL
BACTERIA UR CULT: ABNORMAL

## 2018-10-05 RX ORDER — CIPROFLOXACIN 250 MG/1
250 TABLET, FILM COATED ORAL 2 TIMES DAILY
Qty: 14 TAB | Refills: 0 | Status: SHIPPED | OUTPATIENT
Start: 2018-10-05 | End: 2018-10-12

## 2018-10-18 ENCOUNTER — HOSPITAL ENCOUNTER (OUTPATIENT)
Dept: VASCULAR SURGERY | Age: 68
Discharge: HOME | End: 2018-10-18
Attending: INTERNAL MEDICINE
Payer: MEDICARE

## 2018-10-18 DIAGNOSIS — I73.9 PVD (PERIPHERAL VASCULAR DISEASE) (HCC): ICD-10-CM

## 2018-10-18 PROCEDURE — 93922 UPR/L XTREMITY ART 2 LEVELS: CPT

## 2018-10-18 NOTE — PROCEDURES
[facility]  *** FINAL REPORT ***    Name: Buffy Pizano  MRN: [id number]    Outpatient  : 1950  HIS Order #: [id number]  [id number] [facility] Visit #: [id number]  Date: 18 Oct 2018    TYPE OF TEST: Peripheral Arterial Testing    REASON FOR TEST  PVD    Right Leg  PVR:        Normal  Ankle/Brachial:    Left Leg  PVR:        Normal  Ankle/Brachial:    INTERPRETATION/FINDINGS  PROCEDURE:  Ankle, brachial and digital arterial pressures, PVR  waveforms and digital PPG waveforms were performed. 1. Pulse Volume Recording (PVR) waveforms are normal bilaterally. 2. Distal arteries in both legs are non-compressible therefore the  ankle/brachial indexes may not accurately reflect the extent of  peripheral perfusion. 3. The right great toe/brachial index is 0.38 and the left great  toe/brachial index is 0.44. ADDITIONAL COMMENTS    I have personally reviewed the data relevant to the interpretation of  this  study.     TECHNOLOGIST: Delmi Delgado RVT  Signed: 10/18/2018 11:25 AM    PHYSICIAN: Sadaf John MD  Signed: 10/19/2018 02:25 PM

## 2018-10-22 ENCOUNTER — TELEPHONE (OUTPATIENT)
Dept: INTERNAL MEDICINE CLINIC | Age: 68
End: 2018-10-22

## 2018-10-22 NOTE — TELEPHONE ENCOUNTER
Patient returned call my call for labs results, and stated that she had fallen Saturday in injured her left knee, it's painful and swollen  Asking what can she take OTC for pain an swelling,and should she take he Diclofenac, and what else can she do. Patient advised that per Dr Chrystal Kong stop her Diclofenac and she can take 2 Aleve Bid and apply ice to help with swelling. If not better or gets worse call office back.

## 2018-11-06 ENCOUNTER — OFFICE VISIT (OUTPATIENT)
Dept: INTERNAL MEDICINE CLINIC | Age: 68
End: 2018-11-06

## 2018-11-06 VITALS
HEART RATE: 72 BPM | RESPIRATION RATE: 20 BRPM | BODY MASS INDEX: 47.09 KG/M2 | TEMPERATURE: 98.3 F | DIASTOLIC BLOOD PRESSURE: 84 MMHG | OXYGEN SATURATION: 98 % | SYSTOLIC BLOOD PRESSURE: 128 MMHG | WEIGHT: 293 LBS | HEIGHT: 66 IN

## 2018-11-06 DIAGNOSIS — I89.0 LYMPHEDEMA OF BOTH LOWER EXTREMITIES: ICD-10-CM

## 2018-11-06 DIAGNOSIS — I87.2 STASIS DERMATITIS OF BOTH LEGS: Primary | ICD-10-CM

## 2018-11-06 RX ORDER — CHOLECALCIFEROL (VITAMIN D3) 125 MCG
2 CAPSULE ORAL
COMMUNITY
End: 2018-12-06

## 2018-11-06 NOTE — PROGRESS NOTES
1. Have you been to the ER, urgent care clinic since your last visit? Hospitalized since your last visit? No    2. Have you seen or consulted any other health care providers outside of the [address] [place] [address] since your last visit? Include any pap smears or colon screening.  No    Chief Complaint   Patient presents with    Blood Pressure Check

## 2018-11-06 NOTE — PROGRESS NOTES
Subjective:   Hema Perez is a 70 y.o. female      Chief Complaint   Patient presents with    Blood Pressure Check        History of present illness:  Ms. Daria Bedolla returns in follow up. She has not noted much improvement in her lower extremity edema, nor the stasis dermatitis, with the increased dose of Bumex, although she has not always taking it every day. She will avoid taking it on the days that she goes out and does not take it when she gets home. Still, I am not sure that alone would have caused persistence of the problem. She does need to consider increasing her diuretic and continuing the steroid cream, plus getting compression hose. We will see how that works for the next month. If there is no improvement with those modalities she will need referral to the lymphedema clinic, which is really where I think we are headed. She denies any new cardiorespiratory, GI or  symptoms. She did have a fall recently and bruised her left leg and does have a small hematoma and bruise on the leg just below the knee, but nothing severe and she can ambulate and bear weight without difficulties. I do not think that needs further workup or treatment.         Patient Active Problem List    Diagnosis Date Noted    Hypertension      Priority: 1 - One    Hyperlipemia 08/08/2017     Priority: 1 - One    Lymphedema of both lower extremities 11/06/2018     Priority: 2 - Two    Stasis dermatitis of both legs 10/02/2018     Priority: 2 - Two    IGT (impaired glucose tolerance) 08/08/2017     Priority: 2 - Two    Morbid obesity with BMI of 50.0-59.9, adult ([facility] 08/08/2017     Priority: 2 - Two    LBBB (left bundle branch block) 11/08/2017     Priority: 3 - Three    MUMTAZ on CPAP 08/16/2017     Priority: 3 - Three    CKD (chronic kidney disease) stage 2, GFR 60-89 ml/min 08/16/2017     Priority: 3 - Three    OAB (overactive bladder) 08/08/2017     Priority: 3 - Three    PAF (paroxysmal atrial fibrillation) ([facility] 08/08/2017 Priority: 3 - Three    CAD (coronary artery disease) 08/08/2017     Priority: 3 - Three    Organic cardiac disease 08/08/2017     Priority: 3 - Three    DJD (degenerative joint disease) 08/08/2017     Priority: 3 - Three    GERD with stricture 08/08/2017     Priority: 3 - Three    Depression 08/08/2017     Priority: 3 - Three    PVD (peripheral vascular disease) ([facility] 10/02/2018     Priority: 4 - Four    Kidney stones 08/08/2017     Priority: 4 - Four    Family history of colon cancer 08/08/2017     Priority: 4 - Four    Edema extremities 08/08/2017     Priority: 4 - Four    Vitamin D deficiency 08/08/2017     Priority: 4 - Four    Retinal tear of right eye 08/08/2017     Priority: 5 - Five    Bilateral cataracts 08/08/2017     Priority: 5 - Five    Hematoma 08/08/2017     Priority: 5 - Five    Hair loss 08/08/2017     Priority: 5 - Five    Herpes zoster 08/08/2017     Priority: 5 - Five    Moderate major depression ([facility] 10/02/2018    PE (physical exam), annual 10/01/2018      Past Surgical History:   Procedure Laterality Date    COLORECTAL SCRN; HI RISK IND  8/11/2014         DILATE ESOPHAGUS  8/11/2014         HX APPENDECTOMY      HX HYSTERECTOMY      HX ORTHOPAEDIC Right     thigh/hip hematoma-4 times    HX OTHER SURGICAL      lap sleeve gastrectomy    HX SALPINGO-OOPHORECTOMY      HX UROLOGICAL  3/6/14    CYSTOSCOPY, RIGHT URETEROSCOPY WITH HIGH POWERED HOLMIUM LASER, WITH RIGHT URETERAL STENT PLACEMENT     SD EGD TRANSORAL BIOPSY SINGLE/MULTIPLE  8/11/2014           No Known Allergies   Family History   Problem Relation Age of Onset    Colon Cancer Father     Colon Cancer Paternal Grandmother     Dementia Mother     Hypertension Mother     Sleep Apnea Mother       Social History     Socioeconomic History    Marital status:      Spouse name: Not on file    Number of children: Not on file    Years of education: Not on file    Highest education level: Not on file Social Needs    Financial resource strain: Not on file    Food insecurity - worry: Not on file    Food insecurity - inability: Not on file   [employer] needs - medical: Not on file   [employer] needs - non-medical: Not on file   Occupational History    Not on file   Tobacco Use    Smoking status: Never Smoker    Smokeless tobacco: Never Used   Substance and Sexual Activity    Alcohol use: No    Drug use: No    Sexual activity: Not on file   Other Topics Concern    Not on file   Social History Narrative    Not on file     Outpatient Medications Marked as Taking for the 11/6/18 encounter (Office Visit) with Lashaun Amaro MD   Medication Sig Dispense Refill    naproxen sodium (ALEVE) 220 mg cap Take 2 Tabs by mouth two (2) times daily as needed.  lisinopril (PRINIVIL, ZESTRIL) 20 mg tablet Take  by mouth daily.  triamcinolone acetonide (KENALOG) 0.1 % topical cream Apply  to affected area two (2) times a day. use thin layer 60 g 1    bumetanide (BUMEX) 1 mg tablet Take 2 mg by mouth daily as needed.  simvastatin (ZOCOR) 20 mg tablet take 1 tablet by mouth every evening for cholesterol 90 Tab 2    buPROPion SR (WELLBUTRIN SR) 150 mg SR tablet take 1 tablet by mouth twice a day 180 Tab 1    metoprolol tartrate (LOPRESSOR) 50 mg tablet take 1 tablet by mouth twice a day 180 Tab 4    calcium carbonate-vitamin D3 (CALTRATE WITH VITAMIN D3) 600 mg(1,500mg) -800 unit tab Take 1 Tab by mouth daily.  cyanocobalamin (VITAMIN B-12) 500 mcg tablet Take 500 mcg by mouth daily.  multivitamin (ONE A DAY) tablet Take 2 Tabs by mouth daily.  aspirin delayed-release 81 mg tablet Take 81 mg by mouth daily. Review of Systems              Constitutional:  She denies fever, weight loss, sweats or fatigue. HEENT:  No blurred or double vision, headache or dizziness. No difficulty with swallowing, taste, speech or smell.   Respiratory:  No cough, wheezing or shortness of breath. No sputum production. Cardiac:  Denies chest pain, palpitations, unexplained indigestion, syncope, edema, PND or orthopnea. GI:  No changes in bowel movements, no abdominal pain, no bloating, anorexia, nausea, vomiting or heartburn. :  No frequency or dysuria. Denies incontinence. Extremities:  No joint pain, stiffness or swelling. Skin:  No recent rashes or mole changes. Neurological:  No numbness, tingling, burning paresthesias or loss of motor strength. No syncope, dizziness, frequent headaches or memory loss. Objective:     Vitals:    11/06/18 1344   BP: 128/84   Pulse: 72   Resp: 20   Temp: 98.3 °F (36.8 °C)   TempSrc: Oral   SpO2: 98%   Weight: 321 lb 9.6 oz (145.9 kg)   Height: 5' 6\" (1.676 m)   PainSc:   9   PainLoc: Knee       Body mass index is 51.91 kg/m². Physical Examination:              General Appearance:  Well-developed, well-nourished, no acute  distress. HEENT:     Ears:  The TMs and ear canals were clear. Eyes:  The pupillary responses were normal.  Extraocular muscle function intact. Lids and conjunctiva not injected. Neck:  Supple without thyromegaly or adenopathy. No JVD noted. Lungs:  Clear to auscultation and percussion. Cardiac:  Regular rate and rhythm without murmur. GI: nontender w/o mass. Normal BS's. Extremities:  Lymphedema with stasis dermatitis below knees. Neurological:  Grossly normal.            Assessment/Plan:   Impressions:      ICD-10-CM ICD-9-CM    1. Stasis dermatitis of both legs V54.2 403.7 METABOLIC PANEL, BASIC   2. Lymphedema of both lower extremities X39.9 725.7 METABOLIC PANEL, BASIC        Plan:  1. Continue present meds with increase Bumex  2. Discussed lifestyle modifications including Na restriction, low carb/fat diet, weight reduction and exercise (at least a walking program). Follow-up Disposition:  Return in about 1 month (around 12/6/2018).       Orders Placed This Encounter    METABOLIC Lynda Montano MD

## 2018-11-07 LAB
BUN SERPL-MCNC: 31 MG/DL (ref 8–27)
BUN/CREAT SERPL: 26 (ref 12–28)
CALCIUM SERPL-MCNC: 10.3 MG/DL (ref 8.7–10.3)
CHLORIDE SERPL-SCNC: 103 MMOL/L (ref 96–106)
CO2 SERPL-SCNC: 28 MMOL/L (ref 20–29)
CREAT SERPL-MCNC: 1.17 MG/DL (ref 0.57–1)
GLUCOSE SERPL-MCNC: 99 MG/DL (ref 65–99)
POTASSIUM SERPL-SCNC: 5.5 MMOL/L (ref 3.5–5.2)
SODIUM SERPL-SCNC: 144 MMOL/L (ref 134–144)

## 2018-11-13 RX ORDER — BUMETANIDE 1 MG/1
TABLET ORAL
Qty: 90 TAB | Refills: 1 | Status: SHIPPED | OUTPATIENT
Start: 2018-11-13 | End: 2018-12-06

## 2018-11-13 NOTE — TELEPHONE ENCOUNTER
Patient Last Seen:  11- with labs    Last labs done: NA    Next appointment:  12-      Requested Prescriptions     Pending Prescriptions Disp Refills    bumetanide (BUMEX) 1 mg tablet [Pharmacy Med Name: BUMETANIDE 1 MG TABLET] 90 Tab 1     Sig: take 1 tablet by mouth once daily

## 2018-11-15 ENCOUNTER — LAB ONLY (OUTPATIENT)
Dept: INTERNAL MEDICINE CLINIC | Age: 68
End: 2018-11-15

## 2018-11-15 DIAGNOSIS — N18.2 CKD (CHRONIC KIDNEY DISEASE) STAGE 2, GFR 60-89 ML/MIN: Primary | ICD-10-CM

## 2018-11-15 DIAGNOSIS — E87.5 HYPERKALEMIA: ICD-10-CM

## 2018-11-15 LAB — POTASSIUM SERPL-SCNC: 5 MMOL/L (ref 3.6–5)

## 2018-12-03 RX ORDER — BUPROPION HYDROCHLORIDE 150 MG/1
TABLET, EXTENDED RELEASE ORAL
Qty: 180 TAB | Refills: 1 | Status: SHIPPED | OUTPATIENT
Start: 2018-12-03 | End: 2019-05-15 | Stop reason: SDUPTHER

## 2018-12-06 ENCOUNTER — OFFICE VISIT (OUTPATIENT)
Dept: INTERNAL MEDICINE CLINIC | Age: 68
End: 2018-12-06

## 2018-12-06 VITALS
DIASTOLIC BLOOD PRESSURE: 66 MMHG | OXYGEN SATURATION: 98 % | WEIGHT: 293 LBS | BODY MASS INDEX: 47.09 KG/M2 | SYSTOLIC BLOOD PRESSURE: 128 MMHG | HEIGHT: 66 IN | HEART RATE: 64 BPM

## 2018-12-06 DIAGNOSIS — I10 ESSENTIAL HYPERTENSION: Primary | ICD-10-CM

## 2018-12-06 DIAGNOSIS — M19.91 PRIMARY OSTEOARTHRITIS, UNSPECIFIED SITE: ICD-10-CM

## 2018-12-06 DIAGNOSIS — I89.0 LYMPHEDEMA OF BOTH LOWER EXTREMITIES: ICD-10-CM

## 2018-12-06 DIAGNOSIS — Z99.89 OSA ON CPAP: ICD-10-CM

## 2018-12-06 DIAGNOSIS — G47.33 OSA ON CPAP: ICD-10-CM

## 2018-12-06 DIAGNOSIS — E66.01 MORBID OBESITY WITH BMI OF 50.0-59.9, ADULT (HCC): ICD-10-CM

## 2018-12-06 DIAGNOSIS — I87.2 STASIS DERMATITIS OF BOTH LEGS: ICD-10-CM

## 2018-12-06 LAB
BUN BLD-MCNC: 37 MG/DL (ref 7–17)
CALCIUM BLD-MCNC: 10.3 MG/DL (ref 8.4–10.2)
CHLORIDE BLD-SCNC: 105 MMOL/L (ref 98–107)
CO2 POC: 32 MMOL/L (ref 22–32)
CREAT BLD-MCNC: 0.9 MG/DL (ref 0.7–1.2)
EGFR (POC): 65.2
GLUCOSE POC: 94 MG/DL (ref 65–105)
POTASSIUM SERPL-SCNC: 5.1 MMOL/L (ref 3.6–5)
SODIUM SERPL-SCNC: 143 MMOL/L (ref 137–145)

## 2018-12-06 RX ORDER — NABUMETONE 500 MG/1
500 TABLET, FILM COATED ORAL
Qty: 60 TAB | Refills: 1 | Status: SHIPPED | OUTPATIENT
Start: 2018-12-06 | End: 2019-11-18 | Stop reason: ALTCHOICE

## 2018-12-06 RX ORDER — BUMETANIDE 1 MG/1
1 TABLET ORAL DAILY
COMMUNITY
End: 2019-11-18 | Stop reason: ALTCHOICE

## 2018-12-06 NOTE — PROGRESS NOTES
Subjective:   Marian Berger is a 70 y.o. female      Chief Complaint   Patient presents with    Other     follow up - Stasis dermatitis of both legs        History of present illness:  Ms. Ilda Rice returns in follow up. She has not really noted much in the way of diminishment in her edema, but her stasis changes are very stable and she has no evidence for cellulitis. She is not really inclined to get compression hose because of the cost and the need to be measured for them and does not really need to go to the lymphedema clinic. She has had this lymphedema for years and does not think she needs to change it much. We have actually improved her stasis, which was our original intent. We did talk about ace wrapping her legs, which might help, in place of compression hose and she will try that. We stopped her Diclofenac after her last visit here because of some of the studies indicating it could increase heart disease and she wants something else for her arthritis. We are going to try Relafen 500 mg twice a day as needed. She remains on the higher dose and her blood pressure is excellent today. We will recheck her renal function and potassium as well. Otherwise she can follow up in four months' time fasting.         Patient Active Problem List    Diagnosis Date Noted    Hypertension      Priority: 1 - One    Hyperlipemia 08/08/2017     Priority: 1 - One    Lymphedema of both lower extremities 11/06/2018     Priority: 2 - Two    Stasis dermatitis of both legs 10/02/2018     Priority: 2 - Two    IGT (impaired glucose tolerance) 08/08/2017     Priority: 2 - Two    Morbid obesity with BMI of 50.0-59.9, adult ([facility] [address] 08/08/2017     Priority: 2 - Two    LBBB (left bundle branch block) 11/08/2017     Priority: 3 - Three    MUMTAZ on CPAP 08/16/2017     Priority: 3 - Three    CKD (chronic kidney disease) stage 2, GFR 60-89 ml/min 08/16/2017     Priority: 3 - Three    OAB (overactive bladder) 08/08/2017     Priority: 3 - Three    PA[facility]oxysmal [facility] fibrillation) [facility] 08/08/2017     Priority: 3 - Three    CAD (coronary artery disease) 08/08/2017     Priority: 3 - Three    Organic cardiac disease 08/08/2017     Priority: 3 - Three    DJD (degenerative joint disease) 08/08/2017     Priority: 3 - Three    GERD with stricture 08/08/2017     Priority: 3 - Three    Depression 08/08/2017     Priority: 3 - Three    PVD (peripheral vascular disease) ([facility] [address] 10/02/2018     Priority: 4 - Four    Kidney stones 08/08/2017     Priority: 4 - Four    Family history of colon cancer 08/08/2017     Priority: 4 - Four    Edema extremities 08/08/2017     Priority: 4 - Four    Vitamin D deficiency 08/08/2017     Priority: 4 - Four    Retinal tear of right eye 08/08/2017     Priority: 5 - Five    Bilateral cataracts 08/08/2017     Priority: 5 - Five    Hematoma 08/08/2017     Priority: 5 - Five    Hair loss 08/08/2017     Priority: 5 - Five    Herpes zoster 08/08/2017     Priority: 5 - Five    Moderate major depression ([facility] 10/02/2018    PE (physical exam), annual 10/01/2018      Past Surgical History:   Procedure Laterality Date    COLORECTAL SCRN; HI RISK IND  8/11/2014         DILATE ESOPHAGUS  8/11/2014         HX APPENDECTOMY      HX HYSTERECTOMY      HX ORTHOPAEDIC Right     thigh/hip hematoma-4 times    HX OTHER SURGICAL      lap sleeve gastrectomy    HX SALPINGO-OOPHORECTOMY      HX UROLOGICAL  3/6/14    CYSTOSCOPY, RIGHT URETEROSCOPY WITH HIGH POWERED HOLMIUM LASER, WITH RIGHT URETERAL STENT PLACEMENT     NV EGD TRANSORAL BIOPSY SINGLE/MULTIPLE  8/11/2014           No Known Allergies   Family History   Problem Relation Age of Onset    Colon Cancer Father     Colon Cancer Paternal Grandmother     Dementia Mother     Hypertension Mother     Sleep Apnea Mother       Social History     Socioeconomic History    Marital status:      Spouse name: Not on file    Number of children: Not on file    Years of education: Not on file    Highest education level: Not on file   Social Needs    Financial resource strain: Not on file    Food insecurity - worry: Not on file    Food insecurity - inability: Not on file    Transportation needs - medical: Not on file   Rumgr needs - non-medical: Not on file   Occupational History    Not on file   Tobacco Use    Smoking status: Never Smoker    Smokeless tobacco: Never Used   Substance and Sexual Activity    Alcohol use: No    Drug use: No    Sexual activity: Not on file   Other Topics Concern    Not on file   Social History Narrative    Not on file     Outpatient Medications Marked as Taking for the 12/6/18 encounter (Office Visit) with Dennice Boas, MD   Medication Sig Dispense Refill    nabumetone (RELAFEN) 500 mg tablet Take 1 Tab by mouth two (2) times daily as needed for Pain. 60 Tab 1    bumetanide (BUMEX) 1 mg tablet Take 2 mg by mouth daily.  buPROPion SR (WELLBUTRIN SR) 150 mg SR tablet take 1 tablet by mouth twice a day 180 Tab 1    lisinopril (PRINIVIL, ZESTRIL) 20 mg tablet Take  by mouth daily.  triamcinolone acetonide (KENALOG) 0.1 % topical cream Apply  to affected area two (2) times a day. use thin layer 60 g 1    simvastatin (ZOCOR) 20 mg tablet take 1 tablet by mouth every evening for cholesterol 90 Tab 2    metoprolol tartrate (LOPRESSOR) 50 mg tablet take 1 tablet by mouth twice a day 180 Tab 4    calcium carbonate-vitamin D3 (CALTRATE WITH VITAMIN D3) 600 mg(1,500mg) -800 unit tab Take 1 Tab by mouth daily.  cyanocobalamin (VITAMIN B-12) 500 mcg tablet Take 500 mcg by mouth daily.  multivitamin (ONE A DAY) tablet Take 2 Tabs by mouth daily.  aspirin delayed-release 81 mg tablet Take 81 mg by mouth daily. Review of Systems              Constitutional:  She denies fever, weight loss, sweats or fatigue. HEENT:  No blurred or double vision, headache or dizziness.   No difficulty with swallowing, taste, speech or smell. Respiratory:  No cough, wheezing or shortness of breath. No sputum production. Cardiac:  Denies chest pain, palpitations, unexplained indigestion, syncope, edema, PND or orthopnea. GI:  No changes in bowel movements, no abdominal pain, no bloating, anorexia, nausea, vomiting or heartburn. :  No frequency or dysuria. Denies incontinence. Extremities: Lymphedema and stasis changes. Skin:  Stasis dermatitiss. Neurological:  No numbness, tingling, burning paresthesias or loss of motor strength. No syncope, dizziness, frequent headaches or memory loss. Objective:     Vitals:    12/06/18 1332   BP: 128/66   Pulse: 64   SpO2: 98%   Weight: 325 lb (147.4 kg)   Height: 5' 6\" (1.676 m)   PainSc:   2   PainLoc: Knee       Body mass index is 52.46 kg/m². Physical Examination:              General Appearance:  Well-developed, well-nourished, no acute  distress. HEENT:     Ears:  The TMs and ear canals were clear. Eyes:  The pupillary responses were normal.  Extraocular muscle function intact. Lids and conjunctiva not injected. Neck:  Supple without thyromegaly or adenopathy. No JVD noted. Lungs:  Clear to auscultation and percussion. Cardiac:  Regular rate and rhythm without murmur. GI: nontender w/o mass. Normal BS's. Extremities:Lymphedema. No cellulitis  Skin:  Stasis dermatitis. Neurological:  Grossly normal.            Assessment/Plan:   Impressions:      ICD-10-CM ICD-9-CM    1. Essential hypertension I10 401.9 AMB POC BASIC METABOLIC PANEL   2. Stasis dermatitis of both legs I87.2 454.1 AMB POC BASIC METABOLIC PANEL   3. Morbid obesity with BMI of 50.0-59.9, adult ([facility]) E66.01 278.01     Z68.43 V85.43    4. Lymphedema of both lower extremities I89.0 457.1 AMB POC BASIC METABOLIC PANEL   5. MUMTAZ on CPAP G47.33 327.23     Z99.89 V46.8    6.  Primary osteoarthritis, unspecified site M19.91 715.10 nabumetone (RELAFEN) 500 mg tablet Plan:  1. Continue present meds  2. Discussed lifestyle modifications including Na restriction, low carb/fat diet, weight reduction and exercise (at least a walking program). 3. Ace wrap legs to reduce edema        Follow-up Disposition:  Return in about 4 months (around 4/6/2019) for Fasting. Orders Placed This Encounter    AMB POC BASIC METABOLIC PANEL    nabumetone (RELAFEN) 500 mg tablet     Sig: Take 1 Tab by mouth two (2) times daily as needed for Pain.      Dispense:  60 Tab     Refill:  1       Jacolyn Spatz, MD

## 2019-01-11 DIAGNOSIS — I89.0 LYMPHEDEMA OF BOTH LOWER EXTREMITIES: Primary | ICD-10-CM

## 2019-01-21 ENCOUNTER — OFFICE VISIT (OUTPATIENT)
Dept: SLEEP MEDICINE | Age: 69
End: 2019-01-21

## 2019-01-21 VITALS
DIASTOLIC BLOOD PRESSURE: 84 MMHG | BODY MASS INDEX: 47.09 KG/M2 | HEART RATE: 80 BPM | SYSTOLIC BLOOD PRESSURE: 136 MMHG | OXYGEN SATURATION: 98 % | WEIGHT: 293 LBS | HEIGHT: 66 IN

## 2019-01-21 DIAGNOSIS — Z99.89 OSA ON CPAP: Primary | ICD-10-CM

## 2019-01-21 DIAGNOSIS — E66.01 MORBID OBESITY WITH BMI OF 50.0-59.9, ADULT (HCC): ICD-10-CM

## 2019-01-21 DIAGNOSIS — I10 ESSENTIAL HYPERTENSION: ICD-10-CM

## 2019-01-21 DIAGNOSIS — G47.33 OSA ON CPAP: Primary | ICD-10-CM

## 2019-01-21 NOTE — PROGRESS NOTES
[address] [facility] [place]. [place], [address]  Tel.  [phone]  Fax. [address] [facility] [address]  [place], [address] [facility]  Tel.  [phone]  Fax. [phone] [address]   Tel.  [phone]  Fax. [phone]         Subjective:      Max Guzmán is an 70 y.o. female referred for evaluation for a sleep disorder. She complains of excessive daytime sleepiness associated with frequent awakenings and taking naps during the day. Symptoms began a few years ago, gradually worsening since that time. She usually can fall asleep in 15 minutes. Family or house members note no snoring with CPAP. She denies awakening in the middle of the night because of SOB, choking. Max Guzmán does not wake up frequently at night. She is not bothered by waking up too early and left unable to get back to sleep. She actually sleeps about 6 hours at night and wakes up about 3 times during the night. She does not work shifts: Anabella Pereira indicates she does not get too little sleep at night. Her bedtime is 0130. She awakens at 0900. She does take naps. She takes 5 naps a week lasting 30 to 45, Minute(s). She has the following observed behaviors: Loud snoring, Light snoring, Pauses in breathing;  . Other remarks:   she feels its not working as well as it was before. She has a lot of leg pain at night. This is her original CPAP. Machine unable to be downloaded. She uses [employer] for supplies. [place] Sleepiness Score: 12   which reflect mild daytime drowsiness. No Known Allergies      Current Outpatient Medications:     nabumetone (RELAFEN) 500 mg tablet, Take 1 Tab by mouth two (2) times daily as needed for Pain., Disp: 60 Tab, Rfl: 1    bumetanide (BUMEX) 1 mg tablet, Take 2 mg by mouth daily. , Disp: , Rfl:     buPROPion SR (WELLBUTRIN SR) 150 mg SR tablet, take 1 tablet by mouth twice a day, Disp: 180 Tab, Rfl: 1    lisinopril (PRINIVIL, ZESTRIL) 20 mg tablet, Take  by mouth daily. , Disp: , Rfl:     triamcinolone acetonide (KENALOG) 0.1 % topical cream, Apply  to affected area two (2) times a day. use thin layer, Disp: 60 g, Rfl: 1    simvastatin (ZOCOR) 20 mg tablet, take 1 tablet by mouth every evening for cholesterol, Disp: 90 Tab, Rfl: 2    metoprolol tartrate (LOPRESSOR) 50 mg tablet, take 1 tablet by mouth twice a day, Disp: 180 Tab, Rfl: 4    calcium carbonate-vitamin D3 (CALTRATE WITH VITAMIN D3) 600 mg(1,500mg) -800 unit tab, Take 1 Tab by mouth daily. , Disp: , Rfl:     cyanocobalamin (VITAMIN B-12) 500 mcg tablet, Take 500 mcg by mouth daily. , Disp: , Rfl:     multivitamin (ONE A DAY) tablet, Take 2 Tabs by mouth daily. , Disp: , Rfl:     aspirin delayed-release 81 mg tablet, Take 81 mg by mouth daily. , Disp: , Rfl:      She  has a past medical history of Arthritis, Bilateral cataracts (8/8/2017), CAD (coronary artery disease) (8/8/2017), Chronic kidney disease, CKD (chronic kidney disease) stage 2, GFR 60-89 ml/min (8/16/2017), Depression (8/8/2017), DJD (degenerative joint disease) (8/8/2017), Edema extremities (8/8/2017), Family history of colon cancer (8/8/2017), GERD with stricture (8/8/2017), Hair loss (8/8/2017), Hematoma (8/8/2017), Herpes zoster (8/8/2017), Hyperkalemia (8/8/2017), Hyperlipemia (8/8/2017), Hypertension, Impaired glucose tolerance test (8/8/2017), Kidney stones (8/8/2017), Morbid obesity ([facility] [address] Morbid obesity with body mass index of 40.0-49.9 ([facility] [address] (8/8/2017), OAB (overactive bladder) (8/8/2017), Organic cardiac disease (8/8/2017), MUMTAZ on CPAP (8/16/2017), PAF (paroxysmal atrial fibrillation) ([facility] [address] (8/8/2017), Psychiatric disorder, Retinal tear of right eye (8/8/2017), Unspecified sleep apnea, and Vitamin D deficiency (8/8/2017).     She  has a past surgical history that includes hx hysterectomy; hx urological (3/6/14); hx orthopaedic (Right); pr egd transoral biopsy single/multiple (8/11/2014); pr dilate esophagus (8/11/2014); colorectal scrn; hi risk ind (8/11/2014); hx salpingo-oophorectomy; hx appendectomy; and hx other surgical.    She family history includes Colon Cancer in her father and paternal grandmother; Dementia in her mother; Hypertension in her mother; Sleep Apnea in her mother. She  reports that  has never smoked. she has never used smokeless tobacco. She reports that she does not drink alcohol or use drugs. Review of Systems:  Constitutional:  + weight gain. Eyes:  No blurred vision. CVS:  No significant chest pain  Pulm:  No significant shortness of breath  GI:  No significant nausea or vomiting  :  No significant nocturia  Musculoskeletal:  ++ significant joint pain at night  Skin:  No significant rashes  Neuro:  No significant dizziness   Psych:  Depression controlled    Sleep Review of Systems: notable for + difficulty falling asleep; +frequent awakenings at night;  regular dreaming noted; no nightmares ; no early morning headaches; no memory problems; no concentration issues; no history of any automobile or occupational accidents due to daytime drowsiness. Objective:     Visit Vitals  /84 (BP 1 Location: Right arm, BP Patient Position: Sitting)   Pulse 80   Ht 5' 6\" (1.676 m)   Wt 320 lb (145.2 kg)   SpO2 98%   BMI 51.65 kg/m²         General:   Not in acute distress   Eyes:  Anicteric sclerae, no obvious strabismus   Nose:  No obvious nasal septum deviation    Oropharynx:   Class 3 oropharyngeal outlet, thick tongue base, enlarged and boggy uvula, low-lying soft palate, narrow tonsilo-pharyngeal pilars   Tonsils:   tonsils are present and normal   Neck:   Neck circ. in \"inches\": 15.25; midline trachea   Chest/Lungs:  Equal lung expansion, clear on auscultation    CVS:  Normal rate, regular rhythm; no JVD   Skin:  Warm to touch; no obvious rashes   Neuro:  No focal deficits ; no obvious tremor    Psych:  Normal affect,  normal countenance;          Assessment:       ICD-10-CM ICD-9-CM    1.  MUMTAZ on CPAP G47.33 327.23 SLEEP STUDY UNATTENDED, 4 CHANNEL    Z99.89 V46.8    2. Essential hypertension I10 401.9    3. Morbid obesity with BMI of 50.0-59.9, adult ([facility] E66.01 278.01     Z68.43 V85.43          Plan:     * The patient currently has a High Risk for having sleep apnea. STOP-BANG score 6.  * PSG was ordered for initial evaluation. I have reviewed the different types of sleep studies. Attended sleep studies and home sleep apnea tests. Home sleep testing tests only for the presence and severity of sleep apnea. she understands that if the HSAT does not provide reliable result(such as poor data/failed HSAT recording), she may have to repeat the HSAT or come in for an attended polysomnogram.   Old records not available. She needs a new machine. * She was provided information on sleep apnea including coresponding risk factors and the importance of proper treatment. * Counseling was provided regarding proper sleep hygiene and safe driving. The treatment plan was reviewed with the patient in detail and reviewed with the patient and the lead technologist. she understands that the lead technologist will be calling her  with the results and assisting with the next step in the treatment plan as outlined today during the consultation with me. All of her questions were addressed. 2. Hypertension - she continues on her current regimen. I have reviewed the relationship between hypertension as it relates to sleep-disordered breathing. 3. Obesity - I have counseled the patient regarding the benefits of weight loss. Thank you for allowing us to participate in your patient's medical care. We'll keep you updated on these investigations.   Electronically signed by    Dominga Murray MD  Diplomate in Sleep Medicine  [facility]ASH[facility]

## 2019-01-21 NOTE — PATIENT INSTRUCTIONS
[address] [place]. [place], [address]  Tel.  [phone]  Fax. [address] [facility] [address]  [place], [address] [facility]  Tel.  [phone]  Fax. [phone] [address]  Tel.  [phone]  Fax. [phone]     Sleep Apnea: After Your Visit  Your Care Instructions  Sleep apnea occurs when you frequently stop breathing for 10 seconds or longer during sleep. It can be mild to severe, based on the number of times per hour that you stop breathing or have slowed breathing. Blocked or narrowed airways in your nose, mouth, or throat can cause sleep apnea. Your airway can become blocked when your throat muscles and tongue relax during sleep. Sleep apnea is common, occurring in 1 out of 20 individuals. Individuals having any of the following characteristics should be evaluated and treated right away due to high risk and detrimental consequences from untreated sleep apnea:  1. Obesity  2. Congestive Heart failure  3. Atrial Fibrillation  4. Uncontrolled Hypertension  5. Type II Diabetes  6. Night-time Arrhythmias  7. Stroke  8. Pulmonary Hypertension  9. High-risk Driving Populations (pilots, truck drivers, etc.)  10. Patients Considering Weight-loss Surgery    How do you know you have sleep apnea? You probably have sleep apnea if you answer 'yes' to 3 or more of the following questions:  S - Have you been told that you Snore? T - Are you often Tired during the day? O - Has anyone Observed you stop breathing while sleeping? P- Do you have (or are being treated for) high blood Pressure? B - Are you obese (Body Mass Index > 35)? A - Is your Age 48years old or older? N - Is your Neck size greater than 16 inches? G - Are you male Gender? A sleep physician can prescribe a breathing device that prevents tissues in the throat from blocking your airway.  Or your doctor may recommend using a dental device (oral breathing device) to help keep your airway open. In some cases, surgery may be needed to remove enlarged tissues in the throat. Follow-up care is a key part of your treatment and safety. Be sure to make and go to all appointments, and call your doctor if you are having problems. It's also a good idea to know your test results and keep a list of the medicines you take. How can you care for yourself at home? · Lose weight, if needed. It may reduce the number of times you stop breathing or have slowed breathing. · Go to bed at the same time every night. · Sleep on your side. It may stop mild apnea. If you tend to roll onto your back, sew a pocket in the back of your pajama top. Put a tennis ball into the pocket, and stitch the pocket shut. This will help keep you from sleeping on your back. · Avoid alcohol and medicines such as sleeping pills and sedatives before bed. · Do not smoke. Smoking can make sleep apnea worse. If you need help quitting, talk to your doctor about stop-smoking programs and medicines. These can increase your chances of quitting for good. · Prop up the head of your bed 4 to 6 inches by putting bricks under the legs of the bed. · Treat breathing problems, such as a stuffy nose, caused by a cold or allergies. · Use a continuous positive airway pressure (CPAP) breathing machine if lifestyle changes do not help your apnea and your doctor recommends it. The machine keeps your airway from closing when you sleep. · If CPAP does not help you, ask your doctor whether you should try other breathing machines. A bilevel positive airway pressure machine has two types of air pressureâone for breathing in and one for breathing out. Another device raises or lowers air pressure as needed while you breathe. · If your nose feels dry or bleeds when using one of these machines, talk with your doctor about increasing moisture in the air. A humidifier may help.   · If your nose is runny or stuffy from using a breathing machine, talk with your doctor about using decongestants or a corticosteroid nasal spray. When should you call for help? Watch closely for changes in your health, and be sure to contact your doctor if:  · You still have sleep apnea even though you have made lifestyle changes. · You are thinking of trying a device such as CPAP. · You are having problems using a CPAP or similar machine. Where can you learn more? Go to [employer]. Enter Y721 in the search box to learn more about \"Sleep Apnea: After Your Visit. \"   © 0833-5565 Healthwise, [employer]orporated. Care instructions adapted under license by [facility] (which disclaims liability or warranty for this information). This care instruction is for use with your licensed healthcare professional. If you have questions about a medical condition or this instruction, always ask your healthcare professional. Keith Josue any warranty or liability for your use of this information. PROPER SLEEP HYGIENE    What to avoid  · Do not have drinks with caffeine, such as coffee or black tea, for 8 hours before bed. · Do not smoke or use other types of tobacco near bedtime. Nicotine is a stimulant and can keep you awake. · Avoid drinking alcohol late in the evening, because it can cause you to wake in the middle of the night. · Do not eat a big meal close to bedtime. If you are hungry, eat a light snack. · Do not drink a lot of water close to bedtime, because the need to urinate may wake you up during the night. · Do not read or watch TV in bed. Use the bed only for sleeping and sexual activity. What to try  · Go to bed at the same time every night, and wake up at the same time every morning. Do not take naps during the day. · Keep your bedroom quiet, dark, and cool. · Get regular exercise, but not within 3 to 4 hours of your bedtime. .  · Sleep on a comfortable pillow and mattress.   · If watching the clock makes you anxious, turn it facing away from you so you cannot see the time. · If you worry when you lie down, start a worry book. Well before bedtime, write down your worries, and then set the book and your concerns aside. · Try meditation or other relaxation techniques before you go to bed. · If you cannot fall asleep, get up and go to another room until you feel sleepy. Do something relaxing. Repeat your bedtime routine before you go to bed again. · Make your house quiet and calm about an hour before bedtime. Turn down the lights, turn off the TV, log off the computer, and turn down the volume on music. This can help you relax after a busy day. Drowsy Driving  The [address] [place] [address] Road Traffic Safety Administration cites drowsiness as a causing factor in more than 275,303 police reported crashes annually, resulting in 76,000 injuries and 1,500 deaths. Other surveys suggest 55% of people polled have driven while drowsy in the past year, 23% had fallen asleep but not crashed, 3% crashed, and 2% had and accident due to drowsy driving. Who is at risk? Young Drivers: One study of drowsy driving accidents states that 55% of the drivers were under 25 years. Of those, 75% were male. Shift Workers and Travelers: People who work overnight or travel across time zones frequently are at higher risk of experiencing Circadian Rhythm Disorders. They are trying to work and function when their body is programed to sleep. Sleep Deprived: Lack of sleep has a serious impact on your ability to pay attention or focus on a task. Consistently getting less than the average of 8 hours your body needs creates partial or cumulative sleep deprivation. Untreated Sleep Disorders: Sleep Apnea, Narcolepsy, R.L.S., and other sleep disorders (untreated) prevent a person from getting enough restful sleep. This leads to excessive daytime sleepiness and increases the risk for drowsy driving accidents by up to 7 times.   Medications / Alcohol: Even over the counter medications can cause drowsiness. Medications that impair a drivers attention should have a warning label. Alcohol naturally makes you sleepy and on its own can cause accidents. Combined with excessive drowsiness its effects are amplified. Signs of Drowsy Driving:   * You don't remember driving the last few miles   * You may drift out of your patrick   * You are unable to focus and your thoughts wander   * You may yawn more often than normal   * You have difficulty keeping your eyes open / nodding off   * Missing traffic signs, speeding, or tailgating  Prevention-   Good sleep hygiene, lifestyle and behavioral choices have the most impact on drowsy driving. There is no substitute for sleep and the average person requires 8 hours nightly. If you find yourself driving drowsy, stop and sleep. Consider the sleep hygiene tips provided during your visit as well. Medication Refill Policy: Refills for all medications require 1 week advance notice. Please have your pharmacy fax a refill request. We are unable to fax, or call in \"controled substance\" medications and you will need to pick these prescriptions up from our office. [employer] Activation    Thank you for requesting access to [employer]. Please follow the instructions below to securely access and download your online medical record. [employer] allows you to send messages to your doctor, view your test results, renew your prescriptions, schedule appointments, and more. How Do I Sign Up? 1. In your internet browser, go to [email or web address]. 2. Click on the First Time User? Click Here link in the Sign In box. You will see the New Member Sign Up page. 3. Enter your [employer] Access Code exactly as it appears below. You will not need to use this code after youve completed the sign-up process. If you do not sign up before the expiration date, you must request a new code.     [employer] Access Code: [email or web address]  Expires: 3/7/2019  3:12 PM (This is the date your [employer] access code will )    4. Enter the last four digits of your Social Security Number (xxxx) and Date of Birth (mm/dd/yyyy) as indicated and click Submit. You will be taken to the next sign-up page. 5. Create a vBrandt ID. This will be your [employer] login ID and cannot be changed, so think of one that is secure and easy to remember. 6. Create a [employer] password. You can change your password at any time. 7. Enter your Password Reset Question and Answer. This can be used at a later time if you forget your password. 8. Enter your e-mail address. You will receive e-mail notification when new information is available in [address] 9. Click Sign Up. You can now view and download portions of your medical record. 10. Click the Download Summary menu link to download a portable copy of your medical information. Additional Information    If you have questions, please call [phone]. Remember, [employer] is NOT to be used for urgent needs. For medical emergencies, dial 911.

## 2019-01-22 ENCOUNTER — HOSPITAL ENCOUNTER (OUTPATIENT)
Dept: SLEEP MEDICINE | Age: 69
Discharge: HOME | End: 2019-01-22
Payer: MEDICARE

## 2019-01-22 PROCEDURE — 95806 SLEEP STUDY UNATT&RESP EFFT: CPT | Performed by: INTERNAL MEDICINE

## 2019-01-23 ENCOUNTER — DOCUMENTATION ONLY (OUTPATIENT)
Dept: SLEEP MEDICINE | Age: 69
End: 2019-01-23

## 2019-01-28 ENCOUNTER — TELEPHONE (OUTPATIENT)
Dept: SLEEP MEDICINE | Age: 69
End: 2019-01-28

## 2019-02-04 ENCOUNTER — HOSPITAL ENCOUNTER (OUTPATIENT)
Dept: PHYSICAL THERAPY | Age: 69
End: 2019-02-04

## 2019-02-05 DIAGNOSIS — I87.2 STASIS DERMATITIS OF BOTH LEGS: ICD-10-CM

## 2019-02-05 RX ORDER — TRIAMCINOLONE ACETONIDE 1 MG/G
CREAM TOPICAL
Qty: 60 G | Refills: 1 | Status: SHIPPED | OUTPATIENT
Start: 2019-02-05 | End: 2019-04-29

## 2019-02-08 ENCOUNTER — HOSPITAL ENCOUNTER (OUTPATIENT)
Dept: PHYSICAL THERAPY | Age: 69
Discharge: HOME | End: 2019-02-08
Payer: MEDICARE

## 2019-02-08 VITALS — WEIGHT: 293 LBS | BODY MASS INDEX: 47.09 KG/M2 | HEIGHT: 66 IN

## 2019-02-08 PROCEDURE — 97162 PT EVAL MOD COMPLEX 30 MIN: CPT

## 2019-02-08 PROCEDURE — 97110 THERAPEUTIC EXERCISES: CPT

## 2019-02-08 PROCEDURE — 97140 MANUAL THERAPY 1/> REGIONS: CPT
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
## 2019-02-11 ENCOUNTER — HOSPITAL ENCOUNTER (OUTPATIENT)
Dept: PHYSICAL THERAPY | Age: 69
Discharge: HOME | End: 2019-02-11
Payer: MEDICARE

## 2019-02-11 PROCEDURE — 97140 MANUAL THERAPY 1/> REGIONS: CPT

## 2019-02-11 PROCEDURE — 97110 THERAPEUTIC EXERCISES: CPT

## 2019-02-11 NOTE — PROGRESS NOTES
[facility] [address] [place] [address] 
Physical Therapy Lymphedema Clinic [address], [address] [facility], [address] LYmphedema Therapy Visit: 2 [x]                  Daily note               []                 30 day/10th visit progress note NAME: Ben Trinidad DATE: 2/11/2019 GOALS Short term goals Time frame: to be met by 3/15/2019 1. Patient will demonstrate knowledge of signs/symptoms of infections/cellulitis and be independent in skin care to prevent cellulitis. Patient educated on skin care and prevention of cellulitis and infections. Patient currently not using low ph soap so encouraged this and daily lotion application with low ph lotions. 2.  Patient will demonstrate independence in lymphedema home program of therapeutic exercises to improve circulation and decongest limb to improve ADLs. Patient was educated on the active range of motion exercises today with modifications as needed. Patient has written handouts to follow. 3.  Patient will tolerate multi-layer bandages (MLB) and show measureable decrease in limb volume or participate in the selection process to allow ordering of home compression system (daytime, nighttime garments and pump as needed). Patient reports that she would like to proceed with MLB. Today R LE below the knee was bandaged. Patient educated on signs and symptoms that she may have that would require her to remove MLB if needed before returning to the clinic. Patient plans to try to maintain until next visit. Patient to have her  present next visit so that he can begin to learn MLB.  
  
Long term goals Time frame: to be met by 5/5/2019 1. Pt will show improvement in the [facility] [address] [place] [address] Lymphedema Assessment Scale by decreasing the score to 5/20 and thus allow improvement in patient's quality of life.  
2.  Patient will be independent with don/doff of compression system and use in order to prevent reaccumulation of fluid at discharge. 3.  Pt will be independent in self-MLD and show stable limb volumes showing decongestion and pt. ready for transition to independent restorative phase of lymphedema therapy. Began education in Self MLD today and patient given written handout to follow in the home. SUBJECTIVE REPORT: Patient arrived today eager to begin full CDT treatments. Patient reports that she has looked up some information and is agreeable to work with MLB as recommended to reduce volumes prior to ordering compression garments. Pain: R knee 3/10 (improves with positioning, patient needed to have bolster placed under knee for support) Gait:   
[x]  Independent gait with cane for community distances ADLs:   
Treatment Response:   
[x]   Patient reviewed packet received at evaluation 
[x]   Patient completed home program as prescribed 
[]   Patient not fully compliant with home program to date [x]   Patient received her first MLB to the R LE today. Function:  
[x]   Patient will require assistance of her  to complete her home program for bandaging 
[]   ADLs are requiring less assistance  
[]   Patient able to return to work/leisure activities [facility] [place] Lymphedema Assessment Scale: Deferred Weight: Deferred TREATMENT AND OBJECTIVE DATA SUMMARY:  
Patient/Family Education:  
 
Educated in skin care: [x]   Skin care products [x]   Hygiene [x]  Prevention of cellulitis 
[]   Wound care Educated in exercise: [x]   Walking program 
[]   Mel ball routine 
[]   Stick routine [x]   ROM routine Instructed in self MLD: Written sequence given and reviewed with patient as well as demonstration and instruction during MLD portion of the session. Will need more review on upcoming visits. Instructed in don/doff of compression system:  
[x]   Multi layer bandage (MLB) donning principles and wear precautions []   Day garments []   Night garments Therapeutic Activity 0 minutes Treatment time: 0 Functional Mobility: Modified Independent to Supervision. Difficulty managing B LEs Fall risk: Moderate Therapeutic Exercise/Procedure 20 minutes Treatment time: 1:55pm-2:15pm 
Mel ball exercise program: Deferred Free exercises/ROM: Patient was educated in the supine and seated active range of motion routines x 5 reps with modifications as needed due to her limitations with range of motion in her knees and hips. Home program: Patient to perform daily to BID: 
[x]   Skin care [x]   Deep abdominal breathing 
[x]   Exercise routine [x]   Walking program 
[x]   Rest in supine  
[x]   Compression bandage 
[]   Compression garments 
[]   Vasopneumatic device 
[]   Wound care [x]   Self MLD [x]   Bring supplies to each therapy visit [x]   Purchase necessary supplies: Began bandage list for patient. [x]   Weight loss program (discussed improving eating habits and drinking more water) []   Follow up with Rationale: Exercise will increase the lymph angiomotoricity and tissue pressure of the skin and thus decrease swelling. Modalities 0 minutes Treatment time:  
Vasopneumatic pump: Deferred Manual Lymphatic Drainage (MLD) 80  minutes Treatment time: 12:35pm-1:55pm 
Area to decongest:   
[] UE 
        []  Right     []  Left      [] Trunk [x] LE   
         [x]  Right     [x]  Left      [x] Trunk Sequence used and effectiveness: [] Secondary/Primary sequence for upper extremity with / without trunk involvement 
 
[x] Secondary sequence for lower extremities with  trunk involvement (with skin care and Self MLD review) [x] Modifications were made to manual lymph drainage sequence to exclude cervical techniques secondary to medical history/age [x] Self MLD sequence/techniques/hand strokes Written handouts provided.    
Skin/wound care/debridement: Dermal Status: 
[]   Intact [x]  Dry   
 [x]  Tenuous [] Flaky  
[]  Wound/lesion present [x]  Scars - right hip and lateral thigh. []  Dermatitis   Texture/Consistency: 
[x]  Boggy - bilaterally [x]  Pitting  - below the knee bilaterally [x]  Brawny - below the knee bilaterally []  Combination []  Fibrotic/Woody    
Pigmentation/Color Change: 
[]  Normal []  Hemosiderin  
[x]  Pink/skin discoloration R LE > L LE below the knee bilaterally. []  Erythematous [x]  Hyperpigmented - R LE > L LE below the knee bilaterally []  Hyperlipodermatosclerosis Anomalies: 
[x]  Lymphatic cysts below the knee bilaterally []  Vesicles []  Petechiae []  Warty Vercusis  
[]  Bullae []  Papilloma Circulatory: 
[x]  BASHIR - within normal limits 10/18/2018 []  Varicosities:  
[]  Pulses []  Vascular studies ruled out DVT in past  
[]  DVT History Patient's B LEs were cleansed with Dove soap and then Eucrin lotion applied. Patient educated on the importance of using the recommended soaps and lotions on her skin. Patient does have an ointment that was prescribed for her lower legs by MD, but she did not bring that in today for use to see and use. Recommended to bring in this ointment next visit. Upper/Lower extremity compression: Applied multi-layer compression bandaging to: Below knee [x] Thigh high  [] Upper Extremity [] Right [x]   Left []    Bilateral [] The following multi-layer bandages were applied: 
[x]  Tricofix (G)           
[]  Silver Stockinette            
[]  Tg soft 
[]  Comperm 
 
[]  Transelast  
 
Padding layer: 
[]  Cellona [x]  Fleece Foam: 
[] 10cm roll 
[] 12cm roll 
[x] 15cm roll 
[] Gray foam 
[] Komprex 
[] Komprex 2 Short stretch bandages:  
[] 4cm [x] 6cm [x] 8cm [x] 10cm 
[] 12cm Educated patient in multi-layer bandaging donning principles and precautions. Patient able to tolerate MLB for exercise and she we were able to don her shoe and she was able to ambulate with her cane without issues. Patient may need additional bandage to the MLB, but will need to assess how she handled the bandage on her next visit. Kinesiotaping: Deferred Girth/Volume measurement: Reviewed results of volumetric measurements taken on evaluation. TOTAL TREATMENT 100 mins Circumferential Limb Measurements: 
Deferred today. ASSESSMENT:  
Treatment effectiveness and tolerance: Patient arrived today motivated to begin full CDT treatments. Patient eager to learn and reports that her  is very supportive and will assist her with MLB in the home setting as needed. Patient to stay in MLB to the R LE below the knee until next visit if able and we will assess the progress. Patient's  to come in so that we can begin training him on adequate application of bandage to assist with bandaging between sessions as needed. Progress toward goals: See goal section of note for details. PLAN OF CARE:  
Changes to the plan of care: Continue with plan of care established at evaluation. Frequency: [x]  2 times a week 
[]  Weekly []  Biweekly []  Monthly Other: Began bandage supply list for patient today.   
 
 
Kb Jessica, PTA, CLT

## 2019-02-13 ENCOUNTER — HOSPITAL ENCOUNTER (OUTPATIENT)
Dept: PHYSICAL THERAPY | Age: 69
Discharge: HOME | End: 2019-02-13
Payer: MEDICARE

## 2019-02-13 PROCEDURE — 97110 THERAPEUTIC EXERCISES: CPT

## 2019-02-13 PROCEDURE — 97140 MANUAL THERAPY 1/> REGIONS: CPT

## 2019-02-13 NOTE — PROGRESS NOTES
[facility] 
Physical Therapy Lymphedema Clinic [address], [address] [address] [facility] 22. LYmphedema Therapy Visit: 3 [x]                  Daily note               []                 30 day/10th visit progress note NAME: Izzy Lyn DATE: 2/13/2019 GOALS Short term goals Time frame: to be met by 3/15/2019 1. Patient will demonstrate knowledge of signs/symptoms of infections/cellulitis and be independent in skin care to prevent cellulitis. Patient educated on skin care and prevention of cellulitis and infections. Continued education in daily skin care with a low Ph lotion using MLD techniques. Patient has yet to purchase a low Ph lotion. Reviewed signs and symptoms of cellulitis. 2.  Patient will demonstrate independence in lymphedema home program of therapeutic exercises to improve circulation and decongest limb to improve ADLs. Patient has been educated in the Active ROM routine with modifications and is attempting to perform the routine daily. Will educate patient in the [facility] routine next visit. 3.  Patient will tolerate multi-layer bandages (MLB) and show measureable decrease in limb volume or participate in the selection process to allow ordering of home compression system (daytime, nighttime garments and pump as needed). Patient tolerated the first application of R LE below knee MLB without any issue and is agreeable to continue with MLB this visit. Patient's  was educated in [facility] application and was able to demonstrate adequate technique in the clinic. Will continue education in a future visit.  
  
Long term goals Time frame: to be met by 5/5/2019 1. Pt will show improvement in the [facility] Lymphedema Assessment Scale by decreasing the score to 5/20 and thus allow improvement in patient's quality of life.  
2.  Patient will be independent with don/doff of compression system and use in order to prevent reaccumulation of fluid at discharge. 3.  Pt will be independent in self-MLD and show stable limb volumes showing decongestion and pt. ready for transition to independent restorative phase of lymphedema therapy. R LE volumetric measurements taken today reveal a loss of 501 ml in the involved extremity since the initial evaluation on 2/8/2019. Continued education in the B LE self MLD packet as patient had questions on the technique. She will benefit from additional education in a future visit. SUBJECTIVE REPORT: Patient reports that she tolerated the R LE MLB application without any difficulty. She reports that her leg feels the same and she does not think that her swelling has improved. She has been trying to walk short distances with her cane in the home and perform some of the exercises that she learned last visit. Pain: R knee 3/10 (improves with positioning, patient needed to have bolster placed under knee for support) Gait:   
[x]  Independent gait with cane for community distances. Sitting rest breaks as needed. ADLs:   
Treatment Response:   
[x]   Patient reviewed packet received at evaluation 
[x]   Patient completed home program as prescribed 
[]   Patient not fully compliant with home program to date [x]   Patient agreeable to continue with R LE MLB application. Function:  
[x]   Patient will require assistance from her  to complete her home program for bandaging 
[]   ADLs are requiring less assistance  
[]   Patient able to return to work/leisure activities [facility] Lymphedema Assessment Scale: Deferred Weight: 325.2 lb this visit TREATMENT AND OBJECTIVE DATA SUMMARY:  
Patient/Family Education:  
 
Educated in skin care: [x]   Skin care products [x]   Hygiene [x]  Prevention of cellulitis 
[]   Wound care Educated in exercise: [x]   Walking program 
[]   Mel ball routine 
[]   Stick routine [x]   ROM routine Instructed in self MLD: Continued education in the B LE MLD packet and deep abdominal breathing techniques. Will need more review on upcoming visits. Instructed in don/doff of compression system:  
[x]   Multi layer bandage (MLB) donning principles and wear precautions - patient's  educated in R LE below knee MLB application this visit but will benefit from further education. []   Day garments []   Night garments Therapeutic Activity 0 minutes Treatment time: 0 Functional Mobility: Modified Independent to supervision. Difficulty managing B LEs Fall risk: Moderate Therapeutic Exercise/Procedure 10 minutes Treatment time: 12:05 - 12:10 pm 
Jaco Solarsi exercise program: Deferred Free exercises/ROM: Patient has been educated in the Active ROM routine with modifications and has the written HEP to follow. Patient participated in SAQ's, hip abduction/adduction, heel slides, ankle pumps/ circles during volumetric measurements and MLB application. Home program: Patient to perform daily to BID: 
[x]   Skin care [x]   Deep abdominal breathing 
[x]   Exercise routine [x]   Walking program 
[x]   Rest in supine  
[x]   Compression bandage until next visit on Tuesday as tolerated. []   Compression garments 
[]   Vasopneumatic device 
[]   Wound care [x]   Self MLD [x]   Bring supplies to each therapy visit [x]   Purchase necessary supplies: Patient requested an additional set of bandaging supplies and will pay the vendor, [employer], for 2 sets of bandaging supplies in a future visit. [x]   Weight loss program (discussed improving eating habits and drinking more water) []   Follow up with Rationale: Exercise will increase the lymph angiomotoricity and tissue pressure of the skin and thus decrease swelling. Modalities 0 minutes Treatment time:  
Vasopneumatic pump: Deferred Manual Lymphatic Drainage (MLD) 75  minutes Treatment time: 10:50 am - 12:05 pm 
 Area to decongest:   
[] UE 
        []  Right     []  Left      [] Trunk [x] LE   
         [x]  Right     [x]  Left      [x] Trunk Sequence used and effectiveness: [] Secondary/Primary sequence for upper extremity with / without trunk involvement X Secondary sequence for lower extremities with  trunk involvement (with skin care and Self MLD review) [x] Modifications were made to manual lymph drainage sequence to exclude cervical techniques secondary to medical history/age [x] Self MLD sequence/techniques/hand strokes Continued education this visit. 2/13/2019 R LE   
Skin/wound care/debridement:    
 
 
2/8/2019 R LE Upper/Lower extremity compression: Applied multi-layer compression bandaging to: Below knee [x] Thigh high  [] Upper Extremity [] Right [x]   Left []    Bilateral [] The following multi-layer bandages were applied: 
[x]  Tricofix (G)           
[]  Silver Stockinette            
[]  Tg soft [x]  Comperm (J) 
 
[]  Transelast  
 
Padding layer: 
[]  Cellona [x]  Fleece Foam: 
[] 10cm roll 
[] 12cm roll 
[x] 15cm roll 
[] Gray foam 
[] Komprex 
[] Komprex 2 Short stretch bandages:  
[] 4cm [x] 6cm [x] 8cm [x] 10cm 
[] 12cm Educated patient in multi-layer bandaging donning principles and precautions. Patient is agreeable to purchase 2 sets of bandaging supplies from the vendor, [employer], and will pay the vendor in a future visit. Patient tolerated the first application of R LE below knee MLB without difficulty. Patient's  was educated in [facility] application this visit and will assist patient with MLB at home if the bandage loosens. Patient left the clinic with R LE MLB comfortably in place with tennis shoes donned and cane for safety. Kinesiotaping: Deferred Girth/Volume measurement: R LE volumetric measurements taken today reveal a loss of 501 ml in the involved extremity since the initial evaluation on 2/8/2019. TOTAL TREATMENT 85 mins Circumferential Limb Measurements: 
R LE volumetric measurements taken today. See measurements above. ASSESSMENT:  
Treatment effectiveness and tolerance: Patient tolerated the first application of MLB without any issues and R LE volumetric measurements revealed a loss of 501 ml. Patient's  was educated in [facility] application this visit and will assist patient with bandaging at home if the bandage loosens prior to the next visit. Continued education in participating in daily exercise and self MLD as patient remains skeptical regarding her home program. Patient may benefit from B LE below knee MLB next visit. Progress toward goals: See goal section of note for details. PLAN OF CARE:  
Changes to the plan of care: Continue with plan of care established at evaluation. Frequency: [x]  2 times a week 
[]  Weekly []  Biweekly []  Monthly Other: Will need to pay for 2 sets of bandaging supplies in a future visit.    
 
 
Ananda Logan, PT, CLT

## 2019-02-19 ENCOUNTER — HOSPITAL ENCOUNTER (OUTPATIENT)
Dept: PHYSICAL THERAPY | Age: 69
Discharge: HOME | End: 2019-02-19
Payer: MEDICARE

## 2019-02-19 PROCEDURE — 97140 MANUAL THERAPY 1/> REGIONS: CPT

## 2019-02-19 NOTE — PROGRESS NOTES
[facility] 
Physical Therapy Lymphedema Clinic [address], [address] [facility] 22. LYmphedema Therapy Visit: 4 [x]                  Daily note               []                 30 day/10th visit progress note NAME: Alvaro Diaz DATE: 2/20/2019 GOALS Short term goals Time frame: to be met by 3/15/2019 1. Patient will demonstrate knowledge of signs/symptoms of infections/cellulitis and be independent in skin care to prevent cellulitis. Patient educated on skin care and prevention of cellulitis and infections. Continued education in daily skin care with a low Ph lotion using MLD techniques. Patient states that she is using Eucrin lotion at home. Reviewed signs and symptoms of cellulitis. 2.  Patient will demonstrate independence in lymphedema home program of therapeutic exercises to improve circulation and decongest limb to improve ADLs. Patient has been educated in the Active ROM routine with modifications and is attempting to perform the routine daily. Will educate patient in the [facility] routine on upcoming visit. 3.  Patient will tolerate multi-layer bandages (MLB) and show measureable decrease in limb volume or participate in the selection process to allow ordering of home compression system (daytime, nighttime garments and pump as needed). Patient arrived today frustrated with MLB of R LE below knee, because it is difficult for her  or herself to duplicate clinic bandage. She wants to proceed with bandaging B LEs below the knee today and is hopeful to be measured for velcro on her next visit for at least the R LE. The MLB on the R LE that patient arrived with was adequate, but she reports that she is a perfectionist and it is frustrating for her to bandage. Patient did not wash the supplies and use the new second set as recommended last visit.  New Tricofix applied today, but patient understood that she will need to wash her bandage supplies after her next visit. Patient's  was educated in [facility] application and was able to demonstrate adequate technique in the clinic if bandages need to be reapplied. Will continue to assess her options for compression.  
  
Long term goals Time frame: to be met by 5/5/2019 1. Pt will show improvement in the [address] Lymphedema Assessment Scale by decreasing the score to 5/20 and thus allow improvement in patient's quality of life. 2.  Patient will be independent with don/doff of compression system and use in order to prevent reaccumulation of fluid at discharge. 3.  Pt will be independent in self-MLD and show stable limb volumes showing decongestion and pt. ready for transition to independent restorative phase of lymphedema therapy. Continued education in the B LE self MLD packet as patient continues to not feel that this will benefit her. Patient is extremely skeptical of CDT despite having good results so far. She will benefit from additional education in a future visit. SUBJECTIVE REPORT: Patient arrived very irritated with the whole process of CDT and does not feel that she is getting better despite the objective findings of reduction in swelling and improved condition of her skin. She reports that her  and herself are struggling with the bandage process and she hopes to be measured for garments as soon as possible. Pain: R knee 3/10 (improves with positioning, patient needed to have bolster placed under knee for support) Gait:   
[x]  Independent gait with cane for community distances. Sitting rest breaks as needed.   
ADLs:   
Treatment Response:   
[x]   Patient reviewed packet received at evaluation 
[]   Patient completed home program as prescribed 
[x]   Patient not fully compliant with home program to date (patient not changing position as much as needed and resting in supine during the day, did not wash bandage supplies as requested, not fully performing exercises and Self MLD as expected) [x]   Patient agreeable to continue with MLB application and requested to have B LEs bandaged today. Function:  
[x]   Patient will require assistance from her  to complete her home program for bandaging 
[]   ADLs are requiring less assistance  
[]   Patient able to return to work/leisure activities [facility] Lymphedema Assessment Scale: Deferred Weight: Deferred TREATMENT AND OBJECTIVE DATA SUMMARY:  
Patient/Family Education:  
 
Educated in skin care: [x]   Skin care products [x]   Hygiene [x]  Prevention of cellulitis 
[]   Wound care Educated in exercise: [x]   Walking program 
[]   Mel ball routine 
[]   Stick routine [x]   ROM routine Instructed in self MLD: Continued education in the B LE MLD packet and deep abdominal breathing techniques. Will need more review on upcoming visits. Instructed in don/doff of compression system:  
[x]   Multi layer bandage (MLB) donning principles and wear precautions - patient's  educated in R LE below knee MLB application last visit. Patient and  able to don another MLB, but they did not take down the padding and wash the products as requested. Further education needed. Patient requested to proceed with B LE bandaging today as she has not had any issues so far with MLB R LE in regards to signs and symptoms. [x]   Day garments (discussed options today) []   Night garments Therapeutic Activity 0 minutes Treatment time: 0 Functional Mobility: Modified Independent to supervision. Difficulty managing B LEs on the mat table requiring moderate to max assist for sit<>supine Fall risk: Moderate Therapeutic Exercise/Procedure0 minutes Treatment time: 0 Mel ball exercise program: Deferred Free exercises/ROM: Patient has been educated in the Active ROM routine with modifications and has the written HEP to follow. Patient demonstrated range of motion during bandage application. Home program: Patient to perform daily to BID: 
[x]   Skin care [x]   Deep abdominal breathing 
[x]   Exercise routine [x]   Walking program 
[x]   Rest in supine  
[x]   Compression bandage  
[]   Compression garments 
[]   Vasopneumatic device 
[]   Wound care [x]   Self MLD [x]   Bring supplies to each therapy visit [x]   Purchase necessary supplies: Patient requested an additional set of bandaging supplies last vist and will pay the vendor, [employer], for 2 sets of bandaging supplies in a future visit. [x]   Weight loss program (discussed improving eating habits and drinking more water) []   Follow up with Rationale: Exercise will increase the lymph angiomotoricity and tissue pressure of the skin and thus decrease swelling. Modalities 0 minutes Treatment time:  
Vasopneumatic pump: Deferred. Discussed this may be an option if used conservatively. Will reassess when appropriate. May try in the clinic on upcoming visit and monitor. Manual Lymphatic Drainage (MLD)  90 minutes Treatment time: 12:05pm-1:35pm 
Area to decongest:   
[] UE 
        []  Right     []  Left      [] Trunk [x] LE   
         [x]  Right     [x]  Left      [x] Trunk Sequence used and effectiveness: [] Secondary/Primary sequence for upper extremity with / without trunk involvement 
 
 
 [x] Secondary sequence for lower extremities with  trunk involvement (with skin care and Self MLD review as well has full trunk and R LE treated today) [x] Modifications were made to manual lymph drainage sequence to exclude cervical techniques secondary to medical history/age [x] Self MLD sequence/techniques/hand strokes Continued education this visit. Patient very skeptical that MLD will work and also struggles with technique for deep abdominal breathing. Skin/wound care/debridement: Dermal Status: 
[]   Intact [x]  Dry [x]  Tenuous [] Flaky []  Wound/lesion present [x]  Scars - right hip and lateral thigh. []  Dermatitis   Texture/Consistency: 
[x]  Boggy - bilaterally [x]  Pitting  - below the knee bilaterally [x]  Brawny - below the knee bilaterally []  Combination  
[]  Fibrotic/Woody    
Pigmentation/Color Change: 
[]  Normal []  Hemosiderin [x]  Pink/skin discoloration R LE > L LE below the knee bilaterally.  []  Erythematous [x]  Hyperpigmented - R LE > L LE below the knee bilaterally []  Hyperlipodermatosclerosis Anomalies: 
[x]  Lymphatic cysts below the knee bilaterally []  Vesicles []  Petechiae []  Warty Vercusis []  Bullae []  Papilloma Circulatory: 
[x]  BASHIR - within normal limits 10/18/2018 []  Varicosities:  
[]  Pulses []  Vascular studies ruled out DVT in past  
[]  DVT History 
  
  
Patient's B LEs were cleansed with Dove soap and then A&D with  Eucrin lotion applied. Patient educated on the importance of using the recommended soaps and lotions on her skin. Patient does have an ointment that was prescribed for her lower legs by MD, but she did not bring that in today for use to see and use. Recommended to bring in this ointment in for her visits, but to date has not. The skin texture of the R LE that has been bandaged is improving and softening compared to the L LE that has not been bandaged. See pictures below. B LE  
 
 
 
R LE: 
 
 
 
 
 
L LE: 
 
 
L LE: 
 
  
Upper/Lower extremity compression: Patient arrived to clinic with MLB to the R LE below the knee that they had applied in the home setting. Patient complained of the process of bandaging and has already stated that she does not know how much longer she will be able to bandage. She is eager to be measured for compression garments despite not fully decongesting at this time. Discussed options and will revisit this on her next visit. Applied multi-layer compression bandaging to: Below knee [x] Thigh high  [] Upper Extremity [] Right []   Left []    Bilateral [x] The following multi-layer bandages were applied: 
[x]  Tricofix (G)           
[]  Silver Stockinette            
[]  Tg soft 
[]  Comperm  
 
[]  Transelast  
 
Padding layer: 
[]  Cellona [x]  Fleece Foam: 
[] 10cm roll 
[] 12cm roll 
[x] 15cm roll 
[] Gray foam 
[] Komprex 
[] Komprex 2 Short stretch bandages:  
[] 4cm [x] 6cm [x] 8cm [x] 10cm 
[] 12cm Educated patient in multi-layer bandaging donning principles and precautions. Patient is agreeable to purchase 2 sets of bandaging supplies from the vendor, [employer], and will pay the vendor in a future visit. Patient is frustrated with MLB only a little over a week into the process. Patient and her   were educated in [facility] application on last visit, but the carry over in the home as recommended did not occur. Patient did arrive in [facility], but it had not been washed. She reports that they are not able to effectively don the MLB, but the overall presentation of the bandage and the fact that her leg texture and size improved suggests otherwise. Patient is eager to try to be measured for compression garments as soon as possible. Patient requested B LE below the knee MLB today so this was done. She purchased larger shoes and able to ambulate with her cane >200 feet when leaving the clinic with out issues. Patient was advised that B LE bandaging increases risk for falls so she should be cautious and use her walker. Patient also instructed in the warning signs to remove MLB if needed: shortness of breath, heart palpations or numbness/tingling/discoloration of toes. Patient to try to stay in clinic applied MLB until she returns to the clinic on Thursday. Kinesiotaping: Deferred Girth/Volume measurement: See below for girths. Will reassess volumes next visit. TOTAL TREATMENT 90 mins Circumferential Limb Measurements: 
Affected Side: B LE  R > L Left (cm) Right (cm)  
5th Tuberosity 25.5 
  24.5 Ankle 33.5 
  29.0 Calf 66.5 
  60.0 ASSESSMENT:  
Treatment effectiveness and tolerance: Patient continues to make progress, but is very skeptical of full CDT treatments at this time. Patient and her  did not follow through well with instructions for bandage management, but after education again today hopeful this will improve. Patient is very eager to try to be measured for compression garments on Thursday if possible as she does not feel that she continue with MLB for extended time as recommended to fully reduce her lower leg swelling. Hopeful that if patient is able to maintain clinic applied MLB that we would be able to look at options for compression for the lower R LE and continue to bandage the L LE. Patient needs to be committed to following her home program in order to make gains and progress towards goals. Biggest limiting factor at this time for patient is her inability to see the benefit of fully participating in all aspects of her home program.  
Progress toward goals: See goal section of note for details. PLAN OF CARE:  
Changes to the plan of care: Continue with plan of care established at evaluation. Frequency: [x]  2 times a week (then reduce to once a week) 
[]  Weekly []  Biweekly []  Monthly Other: Will need to pay for 2 sets of bandaging supplies in a future visit.    
 
 
Kb Jessica, PTA, CLT

## 2019-02-21 ENCOUNTER — HOSPITAL ENCOUNTER (OUTPATIENT)
Dept: PHYSICAL THERAPY | Age: 69
Discharge: HOME | End: 2019-02-21
Payer: MEDICARE

## 2019-02-21 PROCEDURE — 97110 THERAPEUTIC EXERCISES: CPT

## 2019-02-21 PROCEDURE — 97140 MANUAL THERAPY 1/> REGIONS: CPT

## 2019-02-21 NOTE — PROGRESS NOTES
[address] [place] [address] 
[facility] [address] [place] [address], [address] Zuleyma Vo  21. LYmphedema Therapy Visit: 5 [x]                  Daily note               []                 30 day/10th visit progress note NAME: Nusrat Hunt DATE: 2/21/2019 GOALS Short term goals Time frame: to be met by 3/15/2019 1. Patient will demonstrate knowledge of signs/symptoms of infections/cellulitis and be independent in skin care to prevent cellulitis. Patient educated on skin care and prevention of cellulitis and infections. Continued education in daily skin care with a low Ph lotion using MLD techniques. Patient states that she is using Eucrin lotion at home. Reviewed signs and symptoms of cellulitis. 2.  Patient will demonstrate independence in lymphedema home program of therapeutic exercises to improve circulation and decongest limb to improve ADLs. Patient has been educated in the Active ROM routine with modifications and is attempting to perform the routine daily. Will educate patient in the [facility] routine on upcoming visit. 3.  Patient will tolerate multi-layer bandages (MLB) and show measureable decrease in limb volume or participate in the selection process to allow ordering of home compression system (daytime, nighttime garments and pump as needed). Patient arrived today reporting she was able to manage B LE MLB below knee. Less frustrated as she did not have to bandage in the home between visits. Patient's R LE was ready to proceed with measuring for a velcro garment today. Patient chose the Solaris Ready Wrap lower leg garment (XL) in black with one extender in black. At this time no foot piece was ordered as patient plans to use her tennis shoes for foot support and we will monitor her foot swelling as this has not been significant.   She is agreeable to continue to stay in [facility] until her product arrives for  the R LE and continue to bandage the L LE enough to decongest to order a garment for that leg as well. Patient encouraged to stay in MLB and to wash her bandage supplies as recommended. Long term goals Time frame: to be met by 5/5/2019 1. Pt will show improvement in the [facility] Lymphedema Assessment Scale by decreasing the score to 5/20 and thus allow improvement in patient's quality of life. 2.  Patient will be independent with don/doff of compression system and use in order to prevent reaccumulation of fluid at discharge. 3.  Pt will be independent in self-MLD and show stable limb volumes showing decongestion and pt. ready for transition to independent restorative phase of lymphedema therapy. Continued education in the B LE self MLD packet. Patient is extremely skeptical of CDT despite having good results so far. Full volumes today reveal that patient has lost 2493 ml on the R LE and 1219 ml on the LE since evaluation which is the equivalent of 7 lbs in B LEs. Patient continues to have doubts that she is improving, but today she did seem to notice the improvement in her skin texture and swelling at her R knee after having MLD last visit. Will continue to monitor. SUBJECTIVE REPORT: Patient arrived today in better spirits and was able to tolerate and manage/maintain B LE below the knee MLB that was applied in the clinic 2 days ago. Patient reports that she is still struggling with knee pain R > L and today had to have MLB removed and her MLB applied while she was in the seated position for comfort. Patient reports that she is not taking her Lasix pills because she wanted to see what bandaging will do on its own. Advised patient that she should take her medications as prescribed by MD or at least contact them to make them aware of her not taking them and she reports they wrote the prescription for PRN. Pain: R knee 4/10 Gait:   
 [x]  Independent gait with cane for community distances. Sitting rest breaks as needed. ADLs:   
Treatment Response:   
[x]   Patient reviewed packet received at evaluation 
[]   Patient completed home program as prescribed 
[x]   Patient not fully compliant with home program to date (patient not changing position as much as needed and resting in supine during the day, did not wash bandage supplies as requested, not fully performing exercises and Self MLD as expected. Patient continues to be very skeptical that CDT will help her swelling. [x]   Patient agreeable to continue with MLB application and requested to have B LEs bandaged and today was able to be measured for her first velcro garment. Function:  
[x]   Patient will require assistance from her  to complete her home program for bandaging 
[]   ADLs are requiring less assistance  
[]   Patient able to return to work/leisure activities [facility] Lymphedema Assessment Scale: Deferred Weight: Attempted weight today on clinic scales, but patient struggles to take the step up required and did not have her feet on the scale evenly so weight not recorded as question of the accuracy. Will try again on upcoming visit to get proper weight. TREATMENT AND OBJECTIVE DATA SUMMARY:  
Patient/Family Education:  
 
Educated in skin care: [x]   Skin care products [x]   Hygiene [x]  Prevention of cellulitis 
[]   Wound care Educated in exercise: [x]   Walking program 
[]   Mel ball routine 
[]   Stick routine [x]   ROM routine Instructed in self MLD: Continued education in the B LE MLD packet and deep abdominal breathing techniques. Will need more review on upcoming visits. Instructed in don/doff of compression system:  
[x]   Multi layer bandage (MLB) donning principles and wear precautions- Continues with B LE below the knee MLB. Patient and  able to bandage in the home as needed between visits. Will continue to educate. [x]   Day garments (patient wanted to proceed with ordering her first velcro system today) []   Night garments Therapeutic Activity 0 minutes Treatment time: 0 Functional Mobility: Modified Independent to supervision. Difficulty managing B LEs on the mat table requiring moderate to max assist for sit<>supine Struggles with knee pain. Fall risk: Moderate Therapeutic Exercise/Procedure 20 minutes Treatment time: 1:40pm-2:00pm 
[employer] exercise program: Deferred Free exercises/ROM: Patient has been educated in the Active ROM routine with modifications and has the written HEP to follow. Patient demonstrated range of motion during bandage application. Patient able to demonstrate supine and seated exercises x 5reps with restrictions in movement noted. Patient has significant pain in her knees from arthritis and has leads a very sedentary lifestyle so exercise is a struggle for her. Discussed ways to modify exercises so that she can become more active. Home program: Patient to perform daily to BID: 
[x]   Skin care [x]   Deep abdominal breathing 
[x]   Exercise routine [x]   Walking program 
[x]   Rest in supine  
[x]   Compression bandage  
[x]   Compression garments (ordered first velcro garment) []   Vasopneumatic device 
[]   Wound care [x]   Self MLD [x]   Bring supplies to each therapy visit [x]   Purchase necessary supplies: Patient able to pay for her bandage supplies and order her Solaris velcro product for the R LE today from [employer]. [x]   Weight loss program (discussed improving eating habits and drinking more water) []   Follow up with Rationale: Exercise will increase the lymph angiomotoricity and tissue pressure of the skin and thus decrease swelling. Modalities 0 minutes Treatment time:  
Vasopneumatic pump: Deferred. Discussed this may be an option if used conservatively. Will reassess when appropriate. May try in the clinic on upcoming visit and monitor. Manual Lymphatic Drainage (MLD) 75 minutes Treatment time: 12:25pm-1:40pm 
Area to decongest:   
[] UE 
        []  Right     []  Left      [] Trunk [x] LE   
         [x]  Right     [x]  Left      [x] Trunk Sequence used and effectiveness: [] Secondary/Primary sequence for upper extremity with / without trunk involvement 
 
 
 [x] Secondary sequence for lower extremities with  trunk involvement (with skin care and Self MLD ) [x] Modifications were made to manual lymph drainage sequence to exclude cervical techniques secondary to medical history/age [x] Self MLD sequence/techniques/hand strokes Continued education this visit. Patient very skeptical that MLD will work and also struggles with technique for deep abdominal breathing. Skin/wound care/debridement:  
Dermal Status: 
[]   Intact [x]  Dry [x]  Tenuous [] Flaky []  Wound/lesion present [x]  Scars - right hip and lateral thigh. []  Dermatitis   Texture/Consistency: 
[x]  Boggy - bilaterally [x]  Pitting  - below the knee bilaterally [x]  Brawny - below the knee bilaterally []  Combination  
[]  Fibrotic/Woody    
Pigmentation/Color Change: 
[]  Normal []  Hemosiderin [x]  Pink/skin discoloration R LE > L LE below the knee bilaterally.  []  Erythematous [x]  Hyperpigmented - R LE > L LE below the knee bilaterally []  Hyperlipodermatosclerosis Anomalies: 
[x]  Lymphatic cysts below the knee bilaterally []  Vesicles []  Petechiae []  Warty Vercusis []  Bullae []  Papilloma Circulatory: 
[x]  BASHIR - within normal limits 10/18/2018 []  Varicosities:  
[]  Pulses []  Vascular studies ruled out DVT in past  
[]  DVT History 
  
  
Patient's B LEs were cleansed with Dove soap and then A&D with  Eucrin lotion applied. Patient educated on the importance of using the recommended soaps and lotions on her skin. Patient does have an ointment that was prescribed for her lower legs by MD, but she did not bring that in today for use to see and use. Recommended to bring in this ointment in for her visits, but to date has not. The skin texture improving. See pictures below. B LE 
2/21/19 B LE 
2/19/19 L LE:    
2/21/19 (after 2 days of clinic applied MLB to L LE) L LE 
2/19/19 Upper/Lower extremity compression: Patient arrived to clinic with B LE MLBbelow the knee that they had applied in the clinic two days ago. Patient has made good progress with R LE and able to proceed with ordering her first velcro product for the R LE today. Patient shown options and she chose the [employer]. Measuring for a XL with a need for an extender strap at this time. Patient educated on the velcro products wear and care and agreeable to start with one and continue to bandage the L LE. Patient understood that she will need to stay in MLB to B LEs with changes between visits at home to continue to improve. Applied multi-layer compression bandaging to: Below knee [x] Thigh high  [] Upper Extremity [] Right []   Left []    Bilateral [x] The following multi-layer bandages were applied: 
[x]  Tricofix (G)           
[]  Silver Stockinette            
[]  Tg soft 
[]  Comperm  
 
[]  Transelast  
 
Padding layer: 
[]  Cellona [x]  Fleece Foam: 
[] 10cm roll 
[] 12cm roll 
[x] 15cm roll 
[] Gray foam 
[] Komprex 
[] Komprex 2 Short stretch bandages:  
[] 4cm [x] 6cm [x] 8cm [x] 10cm 
[] 12cm Educated patient in multi-layer bandaging donning principles and precautions. Patient is frustrated with MLB only a little over a week into the process. Patient and her   were educated in [facility] application and should continue to work on maintaining MLB in the home setting.   Patient was advised that B LE bandaging increases risk for falls so she should be cautious and use her walker. Patient also instructed in the warning signs to remove MLB if needed: shortness of breath, heart palpations or numbness/tingling/discoloration of toes. Kinesiotaping: Test patch placed on L thigh to assess next visit if we can proceed with kinesiotaping on next visit. Patient educated on how to safely remove if needed and to look for any irritation. Girth/Volume measurement: Full volumes today reveal that patient has lost 2493 ml on the R LE and 1219 ml on the LE since evaluation which is the equivalent of 7 lbs in B LEs. TOTAL TREATMENT 95 mins Circumferential Limb Measurements: 
Affected Side: B LE  R > L Full volumes taken today see above for details. ASSESSMENT:  
Treatment effectiveness and tolerance: Patient continues to have concerns and some issues with being skeptical of full CDT. She did see some positive changes today in her skin texture and the appearance of her legs, but she is still very doubtful despite losing 7 lbs of fluid in her legs since evaluation on 2/8/19. Patient eager to get out of MLB as soon as possible so was able to be measured for R LE velcro garment today. Educated patient that continued MLB is recommended, but that velcro would help her to be more independent as she does not want to rely on her  for bandaging every day. Patient to return next week for follow up and hopeful to have her first velcro garment with her at her visit. Progress toward goals: See goal section of note for details. PLAN OF CARE:  
Changes to the plan of care: Continue with plan of care established at evaluation. Frequency: []  2 times a week [x]  Weekly []  Biweekly []  Monthly Other: Patient able to purchase her bandage supplies and order her first velcro product today from [employer].    
 
 
Kb Jessica, PTA, CLT

## 2019-02-25 ENCOUNTER — HOSPITAL ENCOUNTER (OUTPATIENT)
Dept: PHYSICAL THERAPY | Age: 69
Discharge: HOME | End: 2019-02-25
Payer: MEDICARE

## 2019-02-25 PROCEDURE — 97140 MANUAL THERAPY 1/> REGIONS: CPT

## 2019-02-25 NOTE — PROGRESS NOTES
[facility] 
Physical Therapy Lymphedema [facility] [address], [address] [place] Vo  21. LYmphedema Therapy Visit: 6 [x]                  Daily note               []                 30 day/10th visit progress note NAME: Darryn Pyle DATE: 2/25/2019 GOALS Short term goals Time frame: to be met by 3/15/2019 1. Patient will demonstrate knowledge of signs/symptoms of infections/cellulitis and be independent in skin care to prevent cellulitis. Patient educated on skin care and prevention of cellulitis and infections. Continued education in daily skin care with a low Ph lotion using MLD techniques. Patient states that she is using Eucrin lotion at home. Reviewed signs and symptoms of cellulitis. Patient has not been removing her MLB and washing her lower legs in between visits as requested. 2.  Patient will demonstrate independence in lymphedema home program of therapeutic exercises to improve circulation and decongest limb to improve ADLs. Patient has been educated in the Active ROM routine with modifications and is attempting to perform the routine daily. Will educate patient in the [facility] routine on upcoming visit. 3.  Patient will tolerate multi-layer bandages (MLB) and show measureable decrease in limb volume or participate in the selection process to allow ordering of home compression system (daytime, nighttime garments and pump as needed). Patient arrived today reporting she was able to manage B LE MLB below knee. Less frustrated as she did not have to bandage in the home between visits. Patient was measured for Solaris Ready Wrap lower leg garment (XL) in black with one extender in black. At this time no foot piece was ordered as patient plans to use her tennis shoes for foot support and we will monitor her foot swelling as this has not been significant.   She is agreeable to continue to stay in [facility] until her product arrives for  the R LE and continue to bandage the L LE enough to decongest to order a garment for that leg as well. Patient encouraged to stay in MLB and to wash her bandage supplies as recommended as she has not done this to date. Long term goals Time frame: to be met by 5/5/2019 1. Pt will show improvement in the [facility] Lymphedema Assessment Scale by decreasing the score to 5/20 and thus allow improvement in patient's quality of life. 2.  Patient will be independent with don/doff of compression system and use in order to prevent reaccumulation of fluid at discharge. 3.  Pt will be independent in self-MLD and show stable limb volumes showing decongestion and pt. ready for transition to independent restorative phase of lymphedema therapy. Continued education in the B LE self MLD packet. Patient is extremely skeptical of CDT despite having good results so far. Full volumes last visit  revealed that patient had lost 2493 ml on the R LE and 1219 ml on the LE since evaluation which is the equivalent of 7 lbs in B LEs. Will continue to monitor. SUBJECTIVE REPORT: Patient arrived today in MLB to B LEs that was applied last visit. Patient has yet to wash her bandage supplies so this was stressed today that they would need to be removed and washed/dried and then reapplied before her next visit. Pain: R knee 4/10 (difficulty getting comfortable in supine even with pillows under her knees. Gait:   
[x]  Independent gait with cane for community distances. Sitting rest breaks as needed. Patient would benefit from walking with a rolling walker to improve her gait and take pressure off of her knees. Patient has a walker at home, but does not bring it to her appointments. ADLs: Modified Independent for most 
Treatment Response:   
[x]   Patient reviewed packet received at evaluation 
[]   Patient completed home program as prescribed [x]   Patient not fully compliant with home program to date (patient not changing position as much as needed and resting in supine during the day, did not wash bandage supplies as requested, not fully performing exercises and Self MLD as expected. Patient continues to be very skeptical that CDT will help her swelling, despite results. [x]   Patient agreeable to continue with MLB application and is awaiting her first velcro garment. Function:  
[x]   Patient will require assistance from her  to complete her home program for bandaging 
[]   ADLs are requiring less assistance  
[]   Patient able to return to work/leisure activities [facility] Lymphedema Assessment Scale: Deferred Weight: 313.8 lbs TREATMENT AND OBJECTIVE DATA SUMMARY:  
Patient/Family Education:  
 
Educated in skin care: [x]   Skin care products [x]   Hygiene [x]  Prevention of cellulitis 
[]   Wound care Educated in exercise: [x]   Walking program 
[]   Mel ball routine 
[]   Stick routine [x]   ROM routine Instructed in self MLD: Continued education in the B LE MLD packet and deep abdominal breathing techniques. Instructed in don/doff of compression system:  
[x]   Multi layer bandage (MLB) donning principles and wear precautions- Continues with B LE below the knee MLB. Patient and  able to bandage in the home as needed between visits, but have only done this once to date. Will continue to educate on the importance of frequent MLB changes. [x]   Day garments (awaiting the arrival of her first velcro system) []   Night garments Therapeutic Activity 0 minutes Treatment time: 0 Functional Mobility: Modified Independent to supervision. Difficulty managing B LEs on the mat table requiring moderate to max assist for sit<>supine Struggles with knee pain. Fall risk: Moderate Therapeutic Exercise/Procedure 0 minutes Treatment time: 0 Mel ball exercise program: Deferred Free exercises/ROM: Patient has been educated in the Active ROM routine with modifications and has the written HEP to follow. Patient demonstrated range of motion during bandage application. Patient has significant pain in her knees from arthritis and has leads a very sedentary lifestyle so exercise is a struggle for her. Discussed ways to modify exercises so that she can become more active. Home program: Patient to perform daily to BID: 
[x]   Skin care [x]   Deep abdominal breathing 
[x]   Exercise routine [x]   Walking program 
[x]   Rest in supine  
[x]   Compression bandage  
[x]   Compression garments (ordered first velcro garment awaiting for arrival) []   Vasopneumatic device 
[]   Wound care [x]   Self MLD [x]   Bring supplies to each therapy visit [x]   Purchase necessary supplies: Patient has purchased her supplies from [employer]. [x]   Weight loss program (discussed improving eating habits and drinking more water) []   Follow up with Rationale: Exercise will increase the lymph angiomotoricity and tissue pressure of the skin and thus decrease swelling. Modalities 0 minutes Treatment time:  
Vasopneumatic pump: Deferred. Discussed this may be an option if used conservatively. Will reassess when appropriate. May try in the clinic on upcoming visit and monitor. Manual Lymphatic Drainage (MLD) 115 minutes Treatment time: 12:15pm-2:10pm 
Area to decongest:   
[] UE 
        []  Right     []  Left      [] Trunk [x] LE   
         [x]  Right     [x]  Left      [x] Trunk Sequence used and effectiveness: [] Secondary/Primary sequence for upper extremity with / without trunk involvement 
 
 
 [x] Secondary sequence for lower extremities with  trunk involvement with focus on the L LE today. [x] Modifications were made to manual lymph drainage sequence to exclude cervical techniques secondary to medical history/age [x] Self MLD sequence/techniques/hand strokes Continued education this visit. Patient very skeptical that MLD will work and also struggles with technique for deep abdominal breathing. Skin/wound care/debridement:  
Dermal Status: 
[]   Intact [x]  Dry [x]  Tenuous [] Flaky []  Wound/lesion present [x]  Scars - right hip and lateral thigh. []  Dermatitis   Texture/Consistency: 
[x]  Boggy - bilaterally [x]  Pitting  - below the knee bilaterally [x]  Brawny - below the knee bilaterally []  Combination  
[]  Fibrotic/Woody    
Pigmentation/Color Change: 
[]  Normal []  Hemosiderin [x]  Pink/skin discoloration R LE > L LE below the knee bilaterally.  []  Erythematous [x]  Hyperpigmented - R LE > L LE below the knee bilaterally []  Hyperlipodermatosclerosis Anomalies: 
[x]  Lymphatic cysts below the knee bilaterally []  Vesicles []  Petechiae []  Warty Vercusis []  Bullae []  Papilloma Circulatory: 
[x]  BASHIR - within normal limits 10/18/2018 []  Varicosities:  
[]  Pulses []  Vascular studies ruled out DVT in past  
[]  DVT History 
  
  
Patient's B LEs were cleansed with Dove soap and then A&D with  Eucrin lotion applied. Patient educated on the importance of using the recommended soaps and lotions on her skin. Patient does have an ointment that was prescribed for her lower legs by MD, but she did not bring that in today for use to see and use. Recommended to bring in this ointment in for her visits, but to date has not. The skin texture improving. Deferred pictures today. Upper/Lower extremity compression: Patient arrived to clinic with B LE MLB below the knee that they had applied in the clinic last visit. Patient was to remove MLB and wash supplies and reapply and this did not occur. Bandage supplies have not been washed since she began treatment. Educated patient on the importance of the effectiveness of bandages to be washed and dried.  Patient should remove MLB and wash supplies and reapply MLB prior to her next visit at least once as her frequency has changed to once a week. Patient awaiting the arrival of her Solaris Ready Wrap( XL) and extender strap that should arrive before she returns to the clinic. Patient educated on the velcro products wear and care and agreeable to start with one and continue to bandage the L LE once the garment arrives. Patient understood that she will need to stay in MLB to B LEs with changes between visits at home to continue to improve. Applied multi-layer compression bandaging to: Below knee [x] Thigh high  [] Upper Extremity [] Right []   Left []    Bilateral [x] The following multi-layer bandages were applied: 
[x]  Tricofix (G)           
[]  Silver Stockinette            
[]  Tg soft 
[]  Comperm  
 
[]  Transelast  
 
Padding layer: 
[]  Cellona [x]  Fleece Foam: 
[] 10cm roll 
[] 12cm roll 
[x] 15cm roll 
[] Gray foam 
[] Komprex 
[] Komprex 2 Short stretch bandages:  
[] 4cm [x] 6cm [x] 8cm [x] 10cm 
[] 12cm Educated patient in multi-layer bandaging donning principles and precautions. Instructions were issued again on proper donning of MLB specific to her needs. Patient and her  were educated in [facility] application and should continue to work on maintaining MLB in the home setting. Patient was advised that B LE bandaging increases risk for falls so she should be cautious and use her walker. Patient also instructed in the warning signs to remove MLB if needed: shortness of breath, heart palpations or numbness/tingling/discoloration of toes. Kinesiotaping: Patient did not have a reaction the the test piece of tape from last visit. Proceeded today to tape the L upper thigh/knee medial to lateral inguinal nodes  With multiple pieces of kinesiotape. Educated patient on how to safely remove tape if needed. Girth/Volume measurement: Deferred today TOTAL TREATMENT 115 mins Circumferential Limb Measurements: Affected Side: B LE  R > L Deferred ASSESSMENT:  
Treatment effectiveness and tolerance: Patient making gains, but is very pessimistic that she is getting better despite the objective findings. Patient not fully following through with her home program and needs to be diligent in all aspects for her to make and see the changes that she is looking for. Patient now has reduced frequency to once a week and will need to follow through on her home program during this time. Patient to return next week and should have her velcro system in place at that time. Patient aware to contact the clinic if she has any questions she should call before her next visit. Progress toward goals: See goal section of note for details. PLAN OF CARE:  
Changes to the plan of care: Continue with plan of care established at evaluation. Frequency: []  2 times a week [x]  Weekly []  Biweekly []  Monthly Other: Awaiting arrival of velcro product for R LE.    
 
 
Kb GRANADOS Patch, PTA, CLT

## 2019-02-26 VITALS — BODY MASS INDEX: 47.09 KG/M2 | WEIGHT: 293 LBS | HEIGHT: 66 IN

## 2019-03-04 ENCOUNTER — HOSPITAL ENCOUNTER (OUTPATIENT)
Dept: PHYSICAL THERAPY | Age: 69
Discharge: HOME | End: 2019-03-04
Payer: MEDICARE

## 2019-03-04 PROCEDURE — 97140 MANUAL THERAPY 1/> REGIONS: CPT

## 2019-03-04 PROCEDURE — 97110 THERAPEUTIC EXERCISES: CPT

## 2019-03-04 NOTE — PROGRESS NOTES
[address] [place] [address] 
[facility] [address], [address] Zuleyma Vo  21. LYmphedema Therapy Visit: 7 
  []                  Daily note               [x]                 30 day Reassment/Progress note NAME: Nathalia Herrera DATE: 3/4/2019 GOALS Short term goals Time frame: to be met by 3/15/2019 1. Patient will demonstrate knowledge of signs/symptoms of infections/cellulitis and be independent in skin care to prevent cellulitis. Patient educated on skin care and prevention of cellulitis and infections. Continued education in daily skin care with a low Ph lotion using MLD techniques. Patient states that she is using Eucrin lotion at home. Reviewed signs and symptoms of cellulitis. 2.  Patient will demonstrate independence in lymphedema home program of therapeutic exercises to improve circulation and decongest limb to improve ADLs. Patient has been educated in the Active ROM routine with modifications and is attempting to perform the routine daily. Will educate patient in the [facility] routine on upcoming visit. 3.  Patient will tolerate multi-layer bandages (MLB) and show measureable decrease in limb volume or participate in the selection process to allow ordering of home compression system (daytime, nighttime garments and pump as needed). Patient received and arrived wearing her Solaris Ready Wrap lower leg garment (XL) in black on the R LE. She reports that she did not open the extender because she did not need it. She wanted to proceed today with ordering a second garment today for the L LE and additional liner socks. Patient had not washed the liner sock that was provided and worn the same sock for several days. Vendor was contacted to proceed with the order after patient's visit was complete. At this time no foot piece was ordered as patient plans to use her tennis shoes for foot support.  Her shoes that she has utilized for [facility] are too big for good support on the foot with use of velcro so she will need to find something more supportive or order compression foot liners for her velcro systems. Patient bandaged ont he L LE again today and will remain in [facility] for a few days until her garment for the L LE arrives. Patient did wash her bandage supplies that she removed from the R LE so they were used today for bandaging the L LE. Long term goals Time frame: to be met by 5/5/2019 1. Pt will show improvement in the [facility] Lymphedema Assessment Scale by decreasing the score to 5/20 and thus allow improvement in patient's quality of life. Deferred today. 2.  Patient will be independent with don/doff of compression system and use in order to prevent reaccumulation of fluid at discharge. Patient arrived and had appropriately donned her new Solaris Ready Wrap product. Will continue to monitor. 3.  Pt will be independent in self-MLD and show stable limb volumes showing decongestion and pt. ready for transition to independent restorative phase of lymphedema therapy. Continued education in the B LE self MLD packet. Full volumes taken today revealed that patient had lost 657 ml on the R LE and 783 ml on the L LE since her volumes were taken ton 2/21/19. Patient has lost a total of 3150 ml on the R LE and 2002 ml on the L LE since evaluation on 2/8/19. Will continue to monitor. SUBJECTIVE REPORT: Patient arrived wearing her new Solaris Ready Wrap Velcro system on the R LE and MLB that was applied in the clinic on 2/25/19 on the L LE. Patient reports that she is not taking her Bumex daily as prescribed. Encouraged patient to discuss this with her physician as soon as possible due to her medical history she should be compliant with this medication. Patient reports concerns of urinary incontinence.    
Pain: R knee 4/10 (difficulty getting comfortable in supine even with pillows under her knees. Gait:   
[x]  Independent gait with cane for community distances. Sitting rest breaks as needed. Patient would benefit from walking with a rolling walker to improve her gait and take pressure off of her knees. Patient has a walker at home, but does not bring it to her appointments. ADLs: Modified Independent for most 
Treatment Response:   
[x]   Patient reviewed packet received at evaluation 
[x]   Patient completed home program as prescribed  
[]   Patient not fully compliant with home program to date [x]   Patient now with her first velcro garment for the R LE and continues with MLB to the L LE until her second garment arrives to her home. Function:  
[x]   Patient now independent with donning her compression velcro system. Will need help from her  for donning MLB in the home setting. []   ADLs are requiring less assistance  
[]   Patient able to return to work/leisure activities [facility] Lymphedema Assessment Scale: Deferred Weight: 310 lbs TREATMENT AND OBJECTIVE DATA SUMMARY:  
Patient/Family Education:  
 
Educated in skin care: [x]   Skin care products [x]   Hygiene [x]  Prevention of cellulitis 
[]   Wound care Educated in exercise: [x]   Walking program 
[]   Mel ball routine 
[]   Stick routine [x]   ROM routine Instructed in self MLD: Continued education in the B LE MLD packet and deep abdominal breathing techniques. Instructed in don/doff of compression system:  
[x]   Multi layer bandage (MLB) donning principles and wear precautions- Continues with  L LE below the knee MLB until her second compression garment arrives. Patient and  have been educated in how to don adequate bandage. Patient prefers to proceed with ordering second velcro so this was done today. [x]   Day garments -Patient now utilizing a Solaris Ready Wrap on the R LE and a second velcro was ordered for the L LE today. []   Night garments Therapeutic Activity 0 minutes Treatment time: 0 Functional Mobility: Modified Independent to supervision. Difficulty managing B LEs on the mat table requiring moderate assist for sit<>supine Struggles with knee pain R > L. Fall risk: Moderate Therapeutic Exercise/Procedure 15 minutes Treatment time: 1:45pm-2:00pm 
Mel [employer] exercise program: Deferred Free exercises/ROM: Patient has been educated in the Active ROM routine with modifications and has the written HEP to follow. Patient was able to perform modified supine and seated exercise routine today in the clinic x 5 reps. Patient demonstrated range of motion during bandage application. Patient has significant pain in her knees from arthritis and has leads a very sedentary lifestyle so exercise is a struggle for her. Discussed ways to modify exercises so that she can become more active. Home program: Patient to perform daily to BID: 
[x]   Skin care [x]   Deep abdominal breathing 
[x]   Exercise routine [x]   Walking program 
[x]   Rest in supine  
[x]   Compression bandage  
[x]   Compression garments (Solaris Ready Wrap) [x]   Vasopneumatic device (will look into potential of a vasopneumatic pump for home use) []   Wound care [x]   Self MLD [x]   Bring supplies to each therapy visit [x]   Purchase necessary supplies: Patient has purchased her supplies from [employer]. [x]   Weight loss program (discussed improving eating habits and drinking more water) []   Follow up with Rationale: Exercise will increase the lymph angiomotoricity and tissue pressure of the skin and thus decrease swelling. Modalities 0 minutes Treatment time:  
Vasopneumatic pump: Deferred. Discussed this may be an option if used conservatively. May try in the clinic on upcoming visit and monitor. Manual Lymphatic Drainage (MLD) 80 minutes Treatment time: 12:25pm-1:45pm  
Area to decongest:   
[] UE 
 []  Right     []  Left      [] Trunk [x] LE   
         [x]  Right     [x]  Left      [x] Trunk Sequence used and effectiveness: [] Secondary/Primary sequence for upper extremity with / without trunk involvement 
 
 
 [x] Secondary sequence for lower extremities with  trunk involvement with focus on trunk and the L LE today in supine. [x] Modifications were made to manual lymph drainage sequence to exclude cervical techniques secondary to medical history/age [x] Self MLD sequence/techniques/hand strokes Continued education this visit to improve techniques. Skin/wound care/debridement: Pictures: 
3/4/19 (today) 2/8/19 
 
 
 
3/4/19 R LE (today) 2/8/19 R LE (Evaluation) 3/4/19 L LE (today) 2/8/19 L LE (Evaluation) Dermal Status: 
[]   Intact [x]  Dry [x]  Tenuous [] Flaky []  Wound/lesion present [x]  Scars - right hip and lateral thigh. []  Dermatitis   Texture/Consistency: 
[x]  Boggy - bilaterally [x]  Pitting  - below the knee bilaterally [x]  Brawny - below the knee bilaterally []  Combination  
[]  Fibrotic/Woody    
Pigmentation/Color Change: 
[]  Normal []  Hemosiderin [x]  Pink/skin discoloration R LE > L LE below the knee bilaterally.  []  Erythematous [x]  Hyperpigmented - R LE > L LE below the knee bilaterally []  Hyperlipodermatosclerosis Anomalies: 
[x]  Lymphatic cysts below the knee bilaterally []  Vesicles []  Petechiae []  Warty Vercusis []  Bullae []  Papilloma Circulatory: 
[x]  BASHIR - within normal limits 10/18/2018 []  Varicosities:  
[]  Pulses []  Vascular studies ruled out DVT in past  
[]  DVT History 
  
  
Patient's B LEs were cleansed with Dove soap and then A&D with  Eucrin lotion applied. Patient educated on the importance of using the recommended soaps and lotions on her skin.   
Patient does have an ointment that was prescribed for her lower legs by MD, but she did not bring that in today for use to see and use. Recommended to bring in this ointment in for her visits, but to date has not. The skin texture improving. Upper/Lower extremity compression: Patient arrived to clinic with her new Solaris Ready Wrap( XL) Tall on the R LE and MLB on the L LE that was applied last visit. Patient reports she did not open the extender strap that was ordered because she has reduced and able to don without it. Will have patient keep it as she proceeded to order velcro for the L LE today to make sure that it is not needed for that garment. Continued education  on the velcro products wear and care and continue to bandage the L LE until the second garment arrives. Patient had washed the bandage supplies that had be removed in the home for the R LE so those were applied to the L LE today. Applied multi-layer compression bandaging to: 
  
Below knee [x] Thigh high  [] Upper Extremity [] 
  
Right []   Left [x]    Bilateral [] 
  
The following multi-layer bandages were applied: 
[x]  Tricofix (G)           
[]  Silver Stockinette            
[]  Tg soft 
[]  Comperm  
  
[]  Transelast  
  
Padding layer: 
[]  Cellona [x]  Fleece 
  
Foam: 
[] 10cm roll 
[] 12cm roll 
[x] 15cm roll 
[] Gray foam 
[] Komprex 
[] Komprex 2 
  
  
Short stretch bandages:  
[] 4cm [x] 6cm [x] 8cm [x] 10cm 
[] 12cm Educated patient in multi-layer bandaging donning principles and precautions. Instructions were issued again on proper donning of MLB specific to her needs. Able to use some of her supplies to bandage partially of her L Knee/ lower thigh to see how she manages with it. Will reassess next visit. Patient instructed in the warning signs to remove MLB/compression garments  if needed: shortness of breath, heart palpations or numbness/tingling/discoloration of toes. Patient to maintain L LE MLB until her velcro garment arrives in a few days. Kinesiotaping: Patient has some good results with kinesio tape applied last visit. Today tape removed and will let her skin rest and try again another visit as to avoid skin irritation. Girth/Volume measurement: Full volumes taken today revealed that patient had lost 657 ml on the R LE and 783 ml on the L LE since her volumes were taken ton 2/21/19. Patient has lost a total of 3150 ml on the R LE and 2002 ml on the L LE since evaluation on 2/8/19. TOTAL TREATMENT 95 mins Circumferential Limb Measurements: 
Affected Side: B LE  R > L Full volumes taken today. See above for details. ASSESSMENT:  
Treatment effectiveness and tolerance: Patient making gains in reduction of volumes in B LEs and now in velcro system on the R LE and MLB on the L LE until her second velcro garment arrives. Patient more motivated to work on her home program than previous visits. Patient may benefit from a vasopneumatic pump for home use if used conservatively due to the significant lobules at her knees L> R and significant thigh and trunk swelling. Will set up a pump trial on future visit. Will continue to work toward goals and reassess patient next week when she arrives with bilateral lower velcro systems in place. Progress toward goals: See goal section of note for details. PLAN OF CARE:  
Changes to the plan of care: Continue with plan of care established at evaluation. Frequency: []  2 times a week [x]  Weekly []  Biweekly []  Monthly Other: Second velcro product ordered from [employer] today. Kb GRANADOS Patch, PTA, CLT Arabella Ace, PT, CLT

## 2019-03-11 ENCOUNTER — HOSPITAL ENCOUNTER (OUTPATIENT)
Dept: PHYSICAL THERAPY | Age: 69
Discharge: HOME | End: 2019-03-11
Payer: MEDICARE

## 2019-03-11 PROCEDURE — 97016 VASOPNEUMATIC DEVICE THERAPY: CPT

## 2019-03-11 PROCEDURE — 97140 MANUAL THERAPY 1/> REGIONS: CPT
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
## 2019-03-12 RX ORDER — SIMVASTATIN 20 MG/1
TABLET, FILM COATED ORAL
Qty: 90 TAB | Refills: 0 | Status: SHIPPED | OUTPATIENT
Start: 2019-03-12 | End: 2019-06-04 | Stop reason: SDUPTHER

## 2019-03-18 ENCOUNTER — HOSPITAL ENCOUNTER (OUTPATIENT)
Dept: PHYSICAL THERAPY | Age: 69
Discharge: HOME | End: 2019-03-18
Payer: MEDICARE

## 2019-03-18 NOTE — PROGRESS NOTES
[facility]  Physical Therapy Lymphedema Clinic  [address] [facility], [address] [facility], [facility] [address].    Lymphedema Therapy      3/18/2019:  Isaac Mcfarlane was not seen on this date for physical therapy for the following reasons:    [x]      Patient called to cancel the visit for the following reasons: Patient not feeling well today, reporting diarrhea. Visit rescheduled for next week. []      Patient missed the visit and did not call to cancel.     Kb Jessica, PTA, CLT

## 2019-03-27 ENCOUNTER — HOSPITAL ENCOUNTER (OUTPATIENT)
Dept: PHYSICAL THERAPY | Age: 69
Discharge: HOME | End: 2019-03-27
Payer: MEDICARE

## 2019-03-27 PROCEDURE — 97140 MANUAL THERAPY 1/> REGIONS: CPT

## 2019-03-27 PROCEDURE — 97110 THERAPEUTIC EXERCISES: CPT

## 2019-03-27 NOTE — PROGRESS NOTES
[facility] 
Physical Therapy Lymphedema Clinic [address], [address] Zuleyma Vo  22. LYmphedema Therapy Visit: 9 
  []                  Daily note               [x]                 30 day Reassment/Progress note NAME: Alvaro Caraballo DATE: 3/27/2019 GOALS Short term goals Time frame: to be met by 3/15/2019 1. Patient will demonstrate knowledge of signs/symptoms of infections/cellulitis and be independent in skin care to prevent cellulitis. Patient educated on skin care and prevention of cellulitis and infections. Continued education in daily skin care with a low Ph lotion using MLD techniques. Patient is using Eucerin lotion for skin care. Patient has been educated in signs and symptoms of cellulitis. Goal met 3/27/2019.  
2.  Patient will demonstrate independence in lymphedema home program of therapeutic exercises to improve circulation and decongest limb to improve ADLs. Patient has been educated in the Active ROM routine with modifications and was educated in the Hope routine with modifications this visit. Patient instructed to perform a complete routine one to two times per day as tolerated. Goal met 3/27/2019. 
3.  Patient will tolerate multi-layer bandages (MLB) and show measureable decrease in limb volume or participate in the selection process to allow ordering of home compression system (daytime, nighttime garments and pump as needed). Patient has received the B LE Solaris Ready Wrap lower leg garments (XL) in black from the vendor, [employer]. She reports that she did not open the extender strap because she did not need it. Patient will wear a pair of full coverage and supportive tennis shoes for compression to the foot and ankle but may be interested in ordering foot pieces in the future.    Patient received the vaso-pneumatic pump with sequential pressures and truncal component in the mail yesterday and the home training will be scheduled for next week. Long term goals Time frame: to be met by 5/5/2019 1. Pt will show improvement in the [employer] Lymphedema Assessment Scale by decreasing the score to 5/20 and thus allow improvement in patient's quality of life. Deferred today. 2.  Patient will be independent with don/doff of compression system and use in order to prevent reaccumulation of fluid at discharge. Patient is able to independently don and doff the Solaris Ready Wrap velcro products in the clinic. Goal met 3/27/2019. 3.  Pt will be independent in self-MLD and show stable limb volumes showing decongestion and pt. ready for transition to independent restorative phase of lymphedema therapy. Continued education in the B LE self MLD packet. Full volumetric measurements taken today reveal a loss of 317 mL in the uninvolved extremity and a loss of 148 mL in the involved extremity since her last visit on 3/4/2019 and a loss of 2319 mL in the uninvolved extremity and a loss of 3299 mL in the involved extremity since the initial evaluation on 2/8/2019. SUBJECTIVE REPORT: Patient arrived to her visit today wearing the [address] on the R LE and the L LE. Patient reports that she has worn the garments for 2 days without removing them for sleeping or bathing. Patient reports that she does not need to shower every day since she is sedentary and does not do much at home. She prefers to sit in a chair and read most of the day. Pain: R knee 4/10 (difficulty getting comfortable in supine even with pillows under her knees. Gait:   
[x]  Independent gait with cane for community distances. Sitting rest breaks as needed. Patient would benefit from walking with a rolling walker to improve her gait and take pressure off of her knees. Patient has a walker at home, but does not bring it to her appointments.   
ADLs: Modified Independent and able to don and doff compression products without assistance. Treatment Response:   
[x]   Patient reviewed packet received at evaluation 
[]   Patient completed home program as prescribed 
[x]   Patient not fully compliant with home program to date - inconsistent with an exercise program  
[x]   Patient utilizing her velcro product on the R LE and L LE Function:  
[x]   Patient now independent with donning her compression velcro system. Will need help from her  for donning MLB in the home setting. []   ADLs are requiring less assistance  
[]   Patient able to return to work/leisure activities [address] Lymphedema Assessment Scale: Deferred Weight: 313.8 lbs this visit, weight was 322.2 lbs on 2/8/2019 TREATMENT AND OBJECTIVE DATA SUMMARY:  
Patient/Family Education:  
 
Educated in skin care: [x]   Skin care products [x]   Hygiene/removal of garments for bathing and skin assessment. [x]  Prevention of cellulitis 
[]   Wound care Educated in exercise: [x]   Walking program 
[x]   Mel ball routine 
[]   Stick routine [x]   ROM routine Instructed in self MLD: Continued education in the B LE MLD packet and  abdominal breathing techniques. Instructed in don/doff of compression system:  
[x]   Multi layer bandage (MLB) donning principles and wear precautions-  Patient and  have been educated in how to don adequate bandage as needed. [x]   Day garments - Patient arrives to the clinic wearing the [address] on the R LE and L LE. Continued education in the care of, precautions, and wear schedule of the garments. Continued education in removal of garments at night and for bathing. Instructed patient to wash the liner socks daily. []   Night garments Therapeutic Activity 0 minutes Treatment time: N/A Functional Mobility: Modified Independent to supervision. Difficulty managing B LEs on the mat table requiring moderate assist for sit<>supine Struggles with knee pain R > L. Fall risk: Moderate (orthopedic issues causing knee pain and altered gait. ) Therapeutic Exercise/Procedure 15 minutes Treatment time: 12:15 - 12:30 pm 
[facility] exercise program: Patient able to complete 5 reps of the [employer] routine in the clinic and was provided with information on how to obtain a [employer] and the written HEP to follow. Free exercises/ROM: Deferred today. Patient performing modified exercises in the home. Home program: Patient to perform daily to BID: 
[x]   Skin care [x]   Deep abdominal breathing 
[x]   Exercise routine [x]   Walking program 
[x]   Rest in supine  
[x]   Compression bandage  
[x]   Compression garments (Solaris Ready Wraps) [x]   Vasopneumatic device - schedule the home training 
[]   Wound care [x]   Self MLD [x]   Bring supplies to each therapy visit [x]   Purchase necessary supplies: Patient has purchased her supplies from [email or web address]. [x]   Weight loss program (Patient working on improving eating habits and drinking more water) []   Follow up with Rationale: Exercise will increase the lymph angiomotoricity and tissue pressure of the skin and thus decrease swelling. Modalities 0 minutes Treatment time: N/A Vasopneumatic pump: Patient received the Flexitouch Plus vaso-pneumatic pump in the mail and a phone message from the rep from [employer] to schedule the home training. Encouraged patient to return the phone call and set up the appointment for the training so that she can initiate use of  the vaso-pneumatic pump for management of swelling, especially in the upper LE's and trunk. Manual Lymphatic Drainage (MLD) 75 minutes Treatment time: 11:00 am - 12:15 pm 
Area to decongest:   
[] UE 
        []  Right     []  Left      [] Trunk [x] LE   
         [x]  Right     [x]  Left      [x] Trunk Sequence used and effectiveness: [] Secondary/Primary sequence for upper extremity with / without trunk involvement [x] Secondary sequence for lower extremities with  trunk involvement with focus on trunk and the L LE today in supine. [x] Modifications were made to manual lymph drainage sequence to exclude cervical techniques secondary to medical history/age [x] Self MLD sequence/techniques/hand strokes Continued education this visit to improve techniques. Skin/wound care/debridement:  
Dermal Status: 
3/27/2019 
 
 
2/8/2019 B LEs were cleansed with Dove soap and then A&D with Eucerin lotion applied. Patient educated in the importance of using the recommended soaps and lotions on her skin. Patient does have an ointment that was prescribed for her lower legs by MD, but she did not bring that in today for use to see and use. Recommended to bring in this ointment in for her visits, but to date has not. Upper/Lower extremity compression: Patient arrived wearing the B LE Solaris Ready Wrap velcro systems size XL/tall. The garments fit well and are comfortable to the patient. Continued education in the care of, precautions, and wear schedule of the garments since patient has worn the velcro systems for 48 hours without removing them at night and for bathing. Instructed patient to wash the liner socks daily and to assess skin after removal of the garments. Patient voiced interest in ordering an additional pair of Solaris liner socks size XL and the order was submitted to the vendor, [employer]. Patient prefers to wear her full coverage and supportive tennis shoes for compression on the foot and ankle but may be interested in ordering foot pieces in the future if needed. Continue to recommend that patient purchase a pair of compression kyle pants to wear with the velcro systems. Patient and  were educated in [facility] technique during previous visits and will continue to wrap as needed.  
  
Below knee [x] Thigh high  [] Upper Extremity [] 
  
Right []   Left [x]    Bilateral [] 
  
 The following multi-layer bandages were applied: 
[x]  Tricofix (G)           
[]  Silver Stockinette            
[]  Tg soft 
[]  Comperm  
  
[]  Transelast  
  
Padding layer: 
[]  Cellona [x]  Fleece 
  
Foam: 
[] 10cm roll 
[] 12cm roll 
[x] 15cm roll 
[] Gray foam 
[] Komprex 
[] Komprex 2 
  
  
Short stretch bandages:  
[] 4cm [x] 6cm [x] 8cm [x] 10cm 
[] 12cm Educated patient in multi-layer bandaging donning principles and precautions. Patient instructed in the warning signs to remove MLB/compression garments if needed: shortness of breath, heart palpations or numbness/tingling/discoloration of toes. Kinesiotaping: Deferred Girth/Volume measurement: Full volumetric measurements taken today reveal a loss of 317 mL in the uninvolved extremity and a loss of 148 mL in the involved extremity since her last visit on 3/4/2019 and a loss of 2319 mL in the uninvolved extremity and a loss of 3299 mL in the involved extremity since the initial evaluation on 2/8/2019. TOTAL TREATMENT 90 mins Circumferential Limb Measurements: 
Full volumetric measurements taken today. See measurements above. ASSESSMENT:  
Treatment effectiveness and tolerance: Patient demonstrated a loss in LE volumes bilaterally and also a weight loss of 8 lbs since the initial evaluation in February. Patient has received the velcro systems for the R LE and the L LE and will begin use of the vaso-pneumatic pump once the home training is completed. Patient needs to be more compliant with proper use of garments, hygiene/skin care, and daily exercise in order to get the full benefits of the CDT program and be success with long term management of  lymphedema. Patient will continue with her home program and follow up in one month to assess the effectiveness of garments and the pump. Patient should be ready for transition to the restorative phase of the CDT program in 1 to 2 visits. Progress toward goals: Patient met STG 1, STG 2, and LTG 2 this visit. See goal section of note for details. PLAN OF CARE:  
Changes to the plan of care: Continue with plan of care established at evaluation. Frequency: []  2 times a week  
[]  Weekly []  Biweekly [x]  Monthly Other: Vendor: [employer] Vangie Marcus, PT, CLT

## 2019-04-09 ENCOUNTER — TELEPHONE (OUTPATIENT)
Dept: INTERNAL MEDICINE CLINIC | Age: 69
End: 2019-04-09

## 2019-04-09 NOTE — TELEPHONE ENCOUNTER
Patient called c/o chest congestion, non-productive cough, taking cough medicine and Coricidin without any relief. She denies fever. She also states she gets short of breath when walking around her house. Patient is asking if she should be seen or would Dr. Toña Villarreal call in something to help her. Advised patient I will speak with Dr. Toña Villarreal and call her back.

## 2019-04-10 ENCOUNTER — OFFICE VISIT (OUTPATIENT)
Dept: INTERNAL MEDICINE CLINIC | Age: 69
End: 2019-04-10

## 2019-04-10 VITALS
OXYGEN SATURATION: 95 % | RESPIRATION RATE: 18 BRPM | WEIGHT: 293 LBS | BODY MASS INDEX: 47.09 KG/M2 | TEMPERATURE: 97.8 F | HEART RATE: 74 BPM | SYSTOLIC BLOOD PRESSURE: 132 MMHG | HEIGHT: 66 IN | DIASTOLIC BLOOD PRESSURE: 78 MMHG

## 2019-04-10 DIAGNOSIS — R05.9 COUGH: Primary | ICD-10-CM

## 2019-04-10 DIAGNOSIS — J90 PLEURAL EFFUSION, RIGHT: ICD-10-CM
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
## 2019-04-10 NOTE — PROGRESS NOTES
Rudy Glover presents today at the clinic for    Chief Complaint   Patient presents with    Shortness of Breath     x 1 week    Chest Congestion     x 3 weeks    Elevated Blood Pressure     patient states her blood pressure was elevated at the dentist on Monday. Wt Readings from Last 3 Encounters:   04/10/19 313 lb (142 kg)   02/25/19 313 lb 12.8 oz (142.3 kg)   02/08/19 322 lb 3.2 oz (146.1 kg)     Temp Readings from Last 3 Encounters:   04/10/19 97.8 °F (36.6 °C) (Oral)   11/06/18 98.3 °F (36.8 °C) (Oral)   10/02/18 98.1 °F (36.7 °C) (Oral)     BP Readings from Last 3 Encounters:   04/10/19 132/78   01/21/19 136/84   12/06/18 128/66     Pulse Readings from Last 3 Encounters:   04/10/19 74   01/21/19 80   12/06/18 64       Health Maintenance Due   Topic    Shingrix Vaccine Age 50> (1 of 2)    GLAUCOMA SCREENING Q2Y     COLONOSCOPY          Learning Assessment:  :     No flowsheet data found. Depression Screening:  :     3 most recent PHQ Screens 10/2/2018   Little interest or pleasure in doing things Nearly every day   Feeling down, depressed, irritable, or hopeless Nearly every day   Total Score PHQ 2 6       Fall Risk Assessment:  :     Fall Risk Assessment, last 12 mths 10/2/2018   Able to walk? Yes   Fall in past 12 months? No       Abuse Screening:  :     Abuse Screening Questionnaire 10/2/2018   Do you ever feel afraid of your partner? N   Are you in a relationship with someone who physically or mentally threatens you? N   Is it safe for you to go home?  Isacc Hernandez

## 2019-04-18 ENCOUNTER — HOSPITAL ENCOUNTER (OUTPATIENT)
Dept: ULTRASOUND IMAGING | Age: 69
Discharge: HOME | End: 2019-04-18
Attending: INTERNAL MEDICINE
Payer: MEDICARE

## 2019-04-18 ENCOUNTER — HOSPITAL ENCOUNTER (OUTPATIENT)
Dept: CT IMAGING | Age: 69
Discharge: HOME | End: 2019-04-18
Attending: INTERNAL MEDICINE
Payer: MEDICARE

## 2019-04-18 VITALS
HEART RATE: 68 BPM | OXYGEN SATURATION: 96 % | TEMPERATURE: 98.1 F | WEIGHT: 293 LBS | DIASTOLIC BLOOD PRESSURE: 68 MMHG | HEIGHT: 67 IN | RESPIRATION RATE: 16 BRPM | BODY MASS INDEX: 45.99 KG/M2 | SYSTOLIC BLOOD PRESSURE: 129 MMHG

## 2019-04-18 DIAGNOSIS — J90 PLEURAL EFFUSION ON RIGHT: ICD-10-CM

## 2019-04-18 LAB
APPEARANCE FLD: ABNORMAL
COLOR FLD: YELLOW
LYMPHOCYTES NFR FLD: 48 %
MONOS+MACROS NFR FLD: 39 %
NEUTROPHILS NFR FLD: 13 %
NUC CELL # FLD: 1069 /CU MM
RBC # FLD: 56 /CU MM
SPECIMEN SOURCE FLD: ABNORMAL

## 2019-04-18 PROCEDURE — 71250 CT THORAX DX C-: CPT

## 2019-04-18 PROCEDURE — 87205 SMEAR GRAM STAIN: CPT

## 2019-04-18 PROCEDURE — 87116 MYCOBACTERIA CULTURE: CPT

## 2019-04-18 PROCEDURE — 84157 ASSAY OF PROTEIN OTHER: CPT

## 2019-04-18 PROCEDURE — 88112 CYTOPATH CELL ENHANCE TECH: CPT

## 2019-04-18 PROCEDURE — 88305 TISSUE EXAM BY PATHOLOGIST: CPT

## 2019-04-18 PROCEDURE — 74011250636 HC RX REV CODE- 250/636: Performed by: INTERNAL MEDICINE

## 2019-04-18 PROCEDURE — 87075 CULTR BACTERIA EXCEPT BLOOD: CPT

## 2019-04-18 PROCEDURE — 87102 FUNGUS ISOLATION CULTURE: CPT

## 2019-04-18 PROCEDURE — 89050 BODY FLUID CELL COUNT: CPT

## 2019-04-18 PROCEDURE — 32555 ASPIRATE PLEURA W/ IMAGING: CPT

## 2019-04-18 RX ORDER — LIDOCAINE HYDROCHLORIDE 10 MG/ML
10 INJECTION, SOLUTION EPIDURAL; INFILTRATION; INTRACAUDAL; PERINEURAL
Status: COMPLETED | OUTPATIENT
Start: 2019-04-18 | End: 2019-04-18

## 2019-04-18 RX ADMIN — LIDOCAINE HYDROCHLORIDE 10 ML: 10 INJECTION, SOLUTION EPIDURAL; INFILTRATION; INTRACAUDAL; PERINEURAL at 14:00

## 2019-04-18 NOTE — ROUTINE PROCESS
Arrived into the xray recovery area A/O x 3 w/o complaint other than a persistant cough x 4 weeks. Here today for a Thoracentesis.

## 2019-04-18 NOTE — DISCHARGE INSTRUCTIONS
[facility]  Radiology Department  [phone]    Radiologist: Dr Nora Cornejo    Date: 04/18/2019      Thoracentesis Discharge Instructions    You may have an aching pain in the puncture site tonight as the numbing medicine wears off. You may take Tylenol, as directed on the label, for pain or discomfort. Avoid ibuprofen (Advil, Motrin) and aspirin products for the next 48 hours as these drugs may increase your chance of bleeding. You have develop a mild cough as the lungs re expand with air, this should resolve with in the next 24 hours. Resume your previous diet and continue your prescribed medicaitons. Rest for the next 24 hours. If you experience shortness of breath (other than your normal breathing pattern) difficulty breathing, or have severe chest pain, call 911 and go to the nearest Emergency Room. Be sure to follow up with your referring physician for the results of the samples sent to the lab.

## 2019-04-19 ENCOUNTER — TELEPHONE (OUTPATIENT)
Dept: INTERNAL MEDICINE CLINIC | Age: 69
End: 2019-04-19

## 2019-04-19 LAB
PROT FLD-MCNC: 4.6 G/DL
SPECIMEN SOURCE FLD: NORMAL

## 2019-04-19 RX ORDER — CEPHALEXIN 500 MG/1
500 CAPSULE ORAL 4 TIMES DAILY
Qty: 40 CAP | Refills: 0 | Status: SHIPPED | OUTPATIENT
Start: 2019-04-19 | End: 2019-04-29

## 2019-04-19 NOTE — TELEPHONE ENCOUNTER
[facility] and [facility] took some fluid off of her lungs. They requested that she get an antibiotic .

## 2019-04-19 NOTE — TELEPHONE ENCOUNTER
Spoke with patient. Patient states she had fluid taken off her lungs at [facility] yesterday and was told to call Dr Briana Villarreal to get an antibiotic ordered due to an area that had mucus. Discussed with Dr. Nely Delgado. After reviewing patient's chart, Dr. Nely Delgado ordered an antibiotic for the patient and stated for the patient to follow-up as scheduled. Patient notified.

## 2019-04-22 LAB
BACTERIA SPEC CULT: NORMAL
GRAM STN SPEC: NORMAL
GRAM STN SPEC: NORMAL
SERVICE CMNT-IMP: NORMAL
SERVICE CMNT-IMP: NORMAL

## 2019-04-29 ENCOUNTER — OFFICE VISIT (OUTPATIENT)
Dept: INTERNAL MEDICINE CLINIC | Age: 69
End: 2019-04-29

## 2019-04-29 VITALS
OXYGEN SATURATION: 96 % | HEART RATE: 74 BPM | DIASTOLIC BLOOD PRESSURE: 76 MMHG | HEIGHT: 67 IN | WEIGHT: 293 LBS | TEMPERATURE: 98.2 F | SYSTOLIC BLOOD PRESSURE: 138 MMHG | BODY MASS INDEX: 45.99 KG/M2

## 2019-04-29 DIAGNOSIS — I48.0 PAF (PAROXYSMAL ATRIAL FIBRILLATION) (HCC): ICD-10-CM

## 2019-04-29 DIAGNOSIS — E78.2 MIXED HYPERLIPIDEMIA: ICD-10-CM

## 2019-04-29 DIAGNOSIS — I10 ESSENTIAL HYPERTENSION: Primary | ICD-10-CM

## 2019-04-29 DIAGNOSIS — J90 PLEURAL EFFUSION ON RIGHT: ICD-10-CM

## 2019-04-29 DIAGNOSIS — R73.02 IGT (IMPAIRED GLUCOSE TOLERANCE): ICD-10-CM

## 2019-04-29 LAB
A-G RATIO,AGRAT: 1.1 RATIO
ALBUMIN SERPL-MCNC: 3.9 G/DL (ref 3.9–5.4)
ALP SERPL-CCNC: 98 U/L (ref 38–126)
ALT SERPL-CCNC: 14 U/L (ref 9–52)
ANION GAP SERPL CALC-SCNC: 16 MMOL/L
AST SERPL W P-5'-P-CCNC: 17 U/L (ref 14–36)
BILIRUB SERPL-MCNC: 0.3 MG/DL (ref 0.2–1.3)
BUN SERPL-MCNC: 25 MG/DL (ref 7–17)
BUN/CREATININE RATIO,BUCR: 25 RATIO
CALCIUM SERPL-MCNC: 10.5 MG/DL (ref 8.4–10.2)
CHLORIDE SERPL-SCNC: 104 MMOL/L (ref 98–107)
CHOL/HDL RATIO,CHHD: 2 RATIO (ref 0–4)
CHOLEST SERPL-MCNC: 156 MG/DL (ref 0–200)
CK SERPL-CCNC: 21 U/L (ref 30–135)
CO2 SERPL-SCNC: 26 MMOL/L (ref 22–32)
CREAT SERPL-MCNC: 1 MG/DL (ref 0.7–1.2)
GLOBULIN,GLOB: 3.6
GLUCOSE SERPL-MCNC: 98 MG/DL (ref 65–105)
HDLC SERPL-MCNC: 64 MG/DL (ref 35–130)
LDL/HDL RATIO,LDHD: 1 RATIO
LDLC SERPL CALC-MCNC: 59 MG/DL (ref 0–130)
POTASSIUM SERPL-SCNC: 5.6 MMOL/L (ref 3.6–5)
PROT SERPL-MCNC: 7.5 G/DL (ref 6.3–8.2)
SODIUM SERPL-SCNC: 146 MMOL/L (ref 137–145)
TRIGL SERPL-MCNC: 166 MG/DL (ref 0–200)
VLDLC SERPL CALC-MCNC: 33 MG/DL

## 2019-04-29 RX ORDER — LEVOFLOXACIN 500 MG/1
500 TABLET, FILM COATED ORAL DAILY
Qty: 7 TAB | Refills: 0 | Status: SHIPPED | OUTPATIENT
Start: 2019-04-29 | End: 2019-05-09 | Stop reason: ALTCHOICE

## 2019-04-29 NOTE — PROGRESS NOTES
Chief Complaint Patient presents with  Cholesterol Problem  Hypertension Depression Risk Factor Screening:  
 
3 most recent PHQ Screens 4/29/2019 Little interest or pleasure in doing things Several days Feeling down, depressed, irritable, or hopeless Several days Total Score PHQ 2 2 Trouble falling or staying asleep, or sleeping too much Several days Feeling tired or having little energy Several days Poor appetite, weight loss, or overeating Not at all Feeling bad about yourself - or that you are a failure or have let yourself or your family down Nearly every day Trouble concentrating on things such as school, work, reading, or watching TV Not at all Moving or speaking so slowly that other people could have noticed; or the opposite being so fidgety that others notice Not at all Thoughts of being better off dead, or hurting yourself in some way Not at all PHQ 9 Score 7 Functional Ability and Level of Safety: Activities of Daily Living ADL Assessment 10/2/2018 Feeding yourself No Help Needed Getting from bed to chair No Help Needed Getting dressed No Help Needed Bathing or showering No Help Needed Walk across the room (includes cane/walker) No Help Needed Using the telphone No Help Needed Taking your medications No Help Needed Preparing meals No Help Needed Managing money (expenses/bills) No Help Needed Moderately strenuous housework (laundry) No Help Needed Shopping for personal items (toiletries/medicines) No Help Needed Shopping for groceries No Help Needed Driving No Help Needed Climbing a flight of stairs No Help Needed Getting to places beyond walking distances No Help Needed Fall Risk Fall Risk Assessment, last 12 mths 10/2/2018 Able to walk? Yes Fall in past 12 months? No  
 
 
Abuse Screen Abuse Screening Questionnaire 10/2/2018 Do you ever feel afraid of your partner?  Ricky Whitney  
 Are you in a relationship with someone who physically or mentally threatens you? Marvene Hence Is it safe for you to go home? Charlie Brennan Reviewed record in preparation for visit and have obtained necessary documentation. Identified pt with two pt identifiers(name and ). Chief Complaint Patient presents with  Cholesterol Problem  Hypertension Wt Readings from Last 3 Encounters:  
19 305 lb 9.6 oz (138.6 kg) 19 310 lb (140.6 kg) 04/10/19 313 lb (142 kg) Temp Readings from Last 3 Encounters:  
19 98.2 °F (36.8 °C) (Oral) 19 98.1 °F (36.7 °C)  
04/10/19 97.8 °F (36.6 °C) (Oral) BP Readings from Last 3 Encounters:  
19 138/76  
19 129/68  
04/10/19 132/78 Pulse Readings from Last 3 Encounters:  
19 74  
19 68  
04/10/19 74 Coordination of Care Questionnaire: 
:  
 
1) Have you been to an emergency room, urgent care clinic since your last visit? No  
 
Hospitalized since your last visit? No  
 
     
 
2) Have you seen or consulted any other health care providers outside of [address] [place] [address] since your last visit? Yes Dr Hilario Burton Patient Care Team  
Patient Care Team: 
Josh Diaz MD as PCP - General (Internal Medicine)

## 2019-04-29 NOTE — PROGRESS NOTES
Subjective:  
Nicole Henson is a 78 y.o. female Chief Complaint Patient presents with  Cholesterol Problem  Hypertension History of present illness:  Ms. Chaz Rondon returns, having seen Nehal Sandoval approximately three weeks ago with a right pleural effusion. She subsequently was referred to pulmonology, who referred her for thoracentesis. After the thoracentesis a CT scan showed some infiltrate in the right lower lobe, as well as minimal residual fluid. Her breathing did improve after this, but is not back to her baseline. She was treated with Keflex by Dr. Archana Melendrez when he was called about the findings on the CT scan. She continues to have some cough. She continues to be short of breath with exertion, but not as severely as when she first presented. Fluid from her lung revealed no evidence for malignancy and was negative for infection. It did appear to be exudative by virtue of a high protein. WBC was elevated as well. Even though she did not have the symptoms totally consistent with a pneumonia, it still appears this could have been a parapneumonic effusion that had resolved. I am going to treat her further with Levaquin for seven more days and I have asked her to resume her Bumex, which she had stopped after starting at the lymphedema clinic because of reduction in her lower extremity edema. I still think she needs that, however. We will check her back here in ten days' time. She is also due for follow up regarding her impaired glucose tolerance, hyperlipidemia and hypertension. Lab work was obtained today in that regard, including a CMP, A1c, lipid profile and CK. We will call her about those results. Patient Active Problem List  
 Diagnosis Date Noted  Pleural effusion on right 04/29/2019 Priority: 1 - One  
 Hypertension Priority: 1 - One  
 Hyperlipemia 08/08/2017 Priority: 1 - One  Lymphedema of both lower extremities 11/06/2018   Priority: 2 - Two  
  Stasis dermatitis of both legs 10/02/2018 Priority: 2 - Two  
 IGT (impaired glucose tolerance) 08/08/2017 Priority: 2 - Two  Morbid obesity with BMI of 50.0-59.9, [facility] 08/08/2017 Priority: 2 - Two  LBBB (left bundle branch block) 11/08/2017 Priority: 3 - Three  MUMTAZ on CPAP 08/16/2017 Priority: 3 - Three  CKD (chronic kidney disease) stage 2, GFR 60-89 ml/min 08/16/2017 Priority: 3 - Three  OAB (overactive bladder) 08/08/2017 Priority: 3 - Three  PAF (paroxysmal atrial fibrillation) ([facility] 08/08/2017 Priority: 3 - Three  CAD (coronary artery disease) 08/08/2017 Priority: 3 - Three  Organic cardiac disease 08/08/2017 Priority: 3 - Three  DJD (degenerative joint disease) 08/08/2017 Priority: 3 - Three  GERD with stricture 08/08/2017 Priority: 3 - Three  Depression 08/08/2017 Priority: 3 - Three  PVD (peripheral vascular disease) ([facility] 10/02/2018 Priority: 4 - Four  Kidney stones 08/08/2017 Priority: 4 - Four  Family history of colon cancer 08/08/2017 Priority: 4 - Four  Edema extremities 08/08/2017 Priority: 4 - Four  Vitamin D deficiency 08/08/2017 Priority: 4 - Four  Retinal tear of right eye 08/08/2017 Priority: 5 - Five  Bilateral cataracts 08/08/2017 Priority: 5 - Five  Hematoma 08/08/2017 Priority: 5 - Five  
 Hair loss 08/08/2017 Priority: 5 - Five  Herpes zoster 08/08/2017 Priority: 5 - Five  Moderate major depression ([facility] 10/02/2018  PE (physical exam), annual 10/01/2018 Past Surgical History:  
Procedure Laterality Date  COLORECTAL SCRN; HI RISK IND  8/11/2014  DILATE ESOPHAGUS  8/11/2014  HX APPENDECTOMY  HX HYSTERECTOMY  HX ORTHOPAEDIC Right   
 thigh/hip hematoma-4 times  HX OTHER SURGICAL    
 lap sleeve gastrectomy  HX SALPINGO-OOPHORECTOMY  HX UROLOGICAL  3/6/14 CYSTOSCOPY, RIGHT URETEROSCOPY WITH HIGH POWERED HOLMIUM LASER, WITH RIGHT URETERAL STENT PLACEMENT  NM EGD TRANSORAL BIOPSY SINGLE/MULTIPLE  8/11/2014 No Known Allergies Family History Problem Relation Age of Onset  Colon Cancer Father  Colon Cancer Paternal Grandmother  Dementia Mother  Hypertension Mother  Sleep Apnea Mother Social History Socioeconomic History  Marital status:  Spouse name: Not on file  Number of children: Not on file  Years of education: Not on file  Highest education level: Not on file Occupational History  Not on file Social Needs  Financial resource strain: Not on file  Food insecurity:  
  Worry: Not on file Inability: Not on file  Transportation needs:  
  Medical: Not on file Non-medical: Not on file Tobacco Use  Smoking status: Never Smoker  Smokeless tobacco: Never Used Substance and Sexual Activity  Alcohol use: No  
 Drug use: No  
 Sexual activity: Not on file Lifestyle  Physical activity:  
  Days per week: Not on file Minutes per session: Not on file  Stress: Not on file Relationships  Social connections:  
  Talks on phone: Not on file Gets together: Not on file Attends [employer] service: Not on file Active member of club or organization: Not on file Attends meetings of clubs or organizations: Not on file Relationship status: Not on file  Intimate partner violence:  
  Fear of current or ex partner: Not on file Emotionally abused: Not on file Physically abused: Not on file Forced sexual activity: Not on file Other Topics Concern  Not on file Social History Narrative  Not on file Outpatient Medications Marked as Taking for the 4/29/19 encounter (Office Visit) with Sean Campos MD  
Medication Sig Dispense Refill  levoFLOXacin (LEVAQUIN) 500 mg tablet Take 1 Tab by mouth daily.  7 Tab 0  
  simvastatin (ZOCOR) 20 mg tablet TAKE 1 TABLET BY MOUTH EVERY EVENING FOR CHOLESTEROL 90 Tab 0  
 nabumetone (RELAFEN) 500 mg tablet Take 1 Tab by mouth two (2) times daily as needed for Pain. 60 Tab 1  
 bumetanide (BUMEX) 1 mg tablet Take 1 mg by mouth daily.  buPROPion SR (WELLBUTRIN SR) 150 mg SR tablet take 1 tablet by mouth twice a day 180 Tab 1  
 lisinopril (PRINIVIL, ZESTRIL) 20 mg tablet Take  by mouth daily.  metoprolol tartrate (LOPRESSOR) 50 mg tablet take 1 tablet by mouth twice a day 180 Tab 4  
 calcium carbonate-vitamin D3 (CALTRATE WITH VITAMIN D3) 600 mg(1,500mg) -800 unit tab Take 1 Tab by mouth daily.  cyanocobalamin (VITAMIN B-12) 500 mcg tablet Take 500 mcg by mouth daily.  multivitamin (ONE A DAY) tablet Take 2 Tabs by mouth daily.  aspirin delayed-release 81 mg tablet Take 81 mg by mouth daily. Review of Systems Constitutional:  She denies fever, weight loss, sweats or fatigue. HEENT:  No blurred or double vision, headache or dizziness. No difficulty with swallowing, taste, speech or smell. Respiratory:  TOBIAS and cough Cardiac:  Denies chest pain, palpitations, unexplained indigestion, syncope, edema, PND or orthopnea. GI:  No changes in bowel movements, no abdominal pain, no bloating, anorexia, nausea, vomiting or heartburn. :  No frequency or dysuria. Denies incontinence. Extremities:  No joint pain, stiffness or swelling. Skin:  No recent rashes or mole changes. Neurological:  No numbness, tingling, burning paresthesias or loss of motor strength. No syncope, dizziness, frequent headaches or memory loss. Objective:  
 
Vitals:  
 04/29/19 1417 BP: 138/76 Pulse: 74 Temp: 98.2 °F (36.8 °C) TempSrc: Oral  
SpO2: 96% Weight: 305 lb 9.6 oz (138.6 kg) Height: 5' 7\" (1.702 m) PainSc:   9 PainLoc: Knee Body mass index is 47.86 kg/m². Physical Examination: General Appearance:  Well-developed, well-nourished, no acute  distress. HEENT:   
 Ears:  The TMs and ear canals were clear. Eyes:  The pupillary responses were normal.  Extraocular muscle function intact. Lids and conjunctiva not injected. Neck:  Supple without thyromegaly or adenopathy. No JVD noted. Lungs: decreased BS on right. Cardiac:  Regular rate and rhythm without murmur. GI: nontender w/o mass. Normal BS's. Extremities:  No clubbing, cyanosis or edema. Skin:  No rash or unusual mole changes noted. Neurological:  Grossly normal.     
 
 
 
Assessment/Plan:  
Impressions: ICD-10-CM ICD-9-CM 1. Essential hypertension G70 010.8 METABOLIC PANEL, COMPREHENSIVE 2. IGT (impaired glucose tolerance) R73.02 790.22 HEMOGLOBIN A1C WITH EAG 3. Mixed hyperlipidemia E78.2 272.2 LIPID PANEL  
   CK 4. PAF (paroxysmal atrial fibrillation) (HCC) I48.0 427.31   
5. Pleural effusion on right J90 511.9 XR CHEST PA LAT Plan: 1. Continue present meds 2. Discussed lifestyle modifications including Na restriction, low carb/fat diet, weight reduction and exercise (at least a walking program). Follow-up and Dispositions · Return in about 10 days (around 5/9/2019). Orders Placed This Encounter  XR CHEST PA LAT Standing Status:   Future Number of Occurrences:   1 Standing Expiration Date:   4/29/2020 Order Specific Question:   Reason for Exam  
  Answer:   right pleural effusion  HEMOGLOBIN A1C WITH EAG  
 LIPID PANEL ([facility])  METABOLIC PANEL, COMPREHENSIVE ([facility])  CK ([facility])  levoFLOXacin (LEVAQUIN) 500 mg tablet Sig: Take 1 Tab by mouth daily. Dispense:  7 Tab Refill:  0 Jania Alfaro MD

## 2019-04-30 ENCOUNTER — HOSPITAL ENCOUNTER (OUTPATIENT)
Dept: PHYSICAL THERAPY | Age: 69
Discharge: HOME | End: 2019-04-30
Payer: MEDICARE

## 2019-04-30 LAB
EST. AVERAGE GLUCOSE BLD GHB EST-MCNC: 120 MG/DL
HBA1C MFR BLD: 5.8 % (ref 4.8–5.6)

## 2019-04-30 PROCEDURE — 97140 MANUAL THERAPY 1/> REGIONS: CPT

## 2019-04-30 NOTE — PROGRESS NOTES
Blood sugar, liver and kidney function normal.  K+ high. Should get better with restart Bumex. Will recheck at FU. Lipids excellent. LDL 59  Total cholesterol 156. A1C excellent at  5.8.   Still prediabetic only

## 2019-04-30 NOTE — PROGRESS NOTES
[facility] 
Physical Therapy Lymphedema Clinic [address], [address] [place]  22. LYmphedema Therapy Visit: 10 
  []                  Daily note               [x]                 90 day Reassessment with Updated Plan of Care NAME: Alexis Grey DATE: 4/30/2019 GOALS Short term goals Time frame: to be met by 3/15/2019 1. Patient will demonstrate knowledge of signs/symptoms of infections/cellulitis and be independent in skin care to prevent cellulitis. Patient educated on skin care and prevention of cellulitis and infections. Continued education in daily skin care with a low Ph lotion using MLD techniques. Patient is using Eucerin lotion for skin care. Patient has been educated in signs and symptoms of cellulitis. Goal met 3/27/2019.  
2.  Patient will demonstrate independence in lymphedema home program of therapeutic exercises to improve circulation and decongest limb to improve ADLs. Patient has been educated in the Active ROM routine with modifications and was educated in the Hope routine with modifications this visit. Patient instructed to perform a complete routine one to two times per day as tolerated. Goal met 3/27/2019. Short term goal extended to 5/5/19: 
3. Patient will tolerate multi-layer bandages (MLB) and show measureable decrease in limb volume or participate in the selection process to allow ordering of home compression system (daytime, nighttime garments and pump as needed). Patient has received the B LE Solaris Ready Wrap lower leg garments (XL) in black from the vendor, [employer]. She reports that she did not open the extender strap because she did not need it. Patient received the vaso-pneumatic pump with sequential pressures and truncal component in the mail at last visit and since then has had her in home training. She reports that she has not used her pump since training. Encouraged her to use the pump daily conservatively once she has completed care for her pneumonia. Patient today wanted to proceed with be measured for compression knee highs. Due to the severity of her swelling and shape of her lower legs she was shown options and measured for custom flat knit Elvarex Knee highs today. Patient wanted to explore options for vendors, so garments were not ordered today and patient is going to pursue ordering them on her on and contact clinic if we need to submit order to a vendor of her choice. All items for compression have been received or she is in the process of obtaining. Goal Met 4/30/19 Long term goals Time frame: to be met by 5/5/2019 1. Pt will show improvement in the [facility] Lymphedema Assessment Scale by decreasing the score to 5/20 and thus allow improvement in patient's quality of life. Deferred today. Will extend as assess next visit. 2.  Patient will be independent with don/doff of compression system and use in order to prevent reaccumulation of fluid at discharge. Patient is able to independently don and doff the Solaris Ready Wrap velcro products in the clinic. Goal met 3/27/2019. 3.  Pt will be independent in self-MLD and show stable limb volumes showing decongestion and pt. ready for transition to independent restorative phase of lymphedema therapy. Continued education in the B LE self MLD packet. Full volumetric measurements taken today reveal a loss of 2580 mL in the uninvolved extremity and a loss of 1698 ml in the involved extremity since her last visit on 3/27/2019. Since evaluation on 2/8/19 patient has lost a total of 4899 ml on the L LE and 4997 ml on the R LE. Volumes today at the lowest they have been since seeking treatment. Goal Met 4/30/19 New goal 4. Patient will obtain custom flat knit knee highs and will be able show independence with don/doff prior to discharge.  Measured for custom flat knit knee highs today in the clinic. Patient reports that she would like to find a vendor to purchase on her own so a list of several local and national vendors issued to patient. Patient to contact the clinic so we know if she needs us to fax order to a particular vendor. SUBJECTIVE REPORT: Patient had called last week stating that she had to have fluid removed from her lung. She had follow up and was diagnosed with pneumonia and is currently on antibiotics. Patient had a cough for some time, but other than that she reported she had felt fine and had no idea that she was ill. Patient reports that she has shortness of breath at times since her pneumonia. Patient also reporting that her knee pain continues to be an issue for her. It was recommended that she use a rolling walker to take the stress of weight bearing off of her knees, but continues to use a straight cane instead. Gait quality was very poor today. Patient had also mentioned when she called last visit that she had \"blisters\" on the L LE. Noted today that patient presenting with flat lymphatic cysts distal lower legs. This appears to be from not being able to don the velcro straps as secure at the ankle. Patient reports that she would like to proceed with measuring for knee high compression garments today. Patient also reports that she has returned to taking Bumex. Pain: R knee 4/10 (difficulty getting comfortable in supine even with pillows under her knees. Reports that most of her pain is with weight bearing and at times can be 9/10) Gait:   
[x]  Independent gait with cane for community distances. Sitting rest breaks as needed. Patient would benefit from walking with a rolling walker to improve her gait and take pressure off of her knees. Patient has a walker at home, but does not bring it to her appointments. ADLs: Modified Independent and able to don and doff compression products without assistance. Treatment Response:   
[x]   Patient reviewed packet received at evaluation 
[]   Patient completed home program as prescribed 
[x]   Patient not fully compliant with home program to date (improved compliance from last visit) [x]   Patient utilizing her velcro products and self bandaging as needed Function:  
[x]   Patient now independent with donning her compression velcro system. Will need help from her  for donning MLB in the home setting. []   ADLs are requiring less assistance  
[]   Patient able to return to work/leisure activities [facility] Lymphedema Assessment Scale: Deferred Weight:303.2 lbs down from last visit weight of 313.8 lbs TREATMENT AND OBJECTIVE DATA SUMMARY:  
Patient/Family Education:  
 
Educated in skin care: [x]   Skin care products [x]   Hygiene/removal of garments for bathing and skin assessment. [x]  Prevention of cellulitis 
[]   Wound care Educated in exercise: [x]   Walking program 
[x]   Mel ball routine 
[]   Stick routine [x]   ROM routine Instructed in self MLD: Continued education in the B LE MLD packet and  abdominal breathing techniques. Instructed in don/doff of compression system:  
[x]   Multi layer bandage (MLB) donning principles and wear precautions-  Patient and  have been educated in how to don adequate bandage as needed. [x]   Day garments -Solaris Ready Wraps B LEs Measured today for custom flat knit garments. []   Night garments Therapeutic Activity 0 minutes Treatment time: N/A Functional Mobility: Modified Independent to supervision. Struggles with B knee pain R > L. Fall risk: Moderate (orthopedic issues causing knee pain and altered gait. ) Therapeutic Exercise/Procedure 0 minutes Treatment time: 0 Mel ball exercise program: Deferred today Free exercises/ROM: Deferred today. Patient performing modified exercises in the home.    
Home program: Patient to perform daily to BID: 
 [x]   Skin care [x]   Deep abdominal breathing 
[x]   Exercise routine [x]   Walking program 
[x]   Rest in supine  
[x]   Compression bandage  
[x]   Compression garments (Solaris Ready Wraps) Measured for custom flat knit knee high garments today. [x]   Vasopneumatic device -had in home training, but holding use daily until pneumonia has been completely treated.  
[]   Wound care [x]   Self MLD [x]   Bring supplies to each therapy visit [x]   Purchase necessary supplies: Patient has purchased her supplies from [employer] in the past, but did not want to use this vendor again. Patient given multiple vendors local and national to try to use for garments. [x]   Weight loss program (Patient working on improving eating habits and drinking more water) []   Follow up with Rationale: Exercise will increase the lymph angiomotoricity and tissue pressure of the skin and thus decrease swelling. Modalities 0 minutes Treatment time: N/A Vasopneumatic pump: Patient received the Flexitouch Plus vaso-pneumatic pump and has had in home training since last visit. She reports she has not used the pump since home set up. Patient now to hold use until her pneumonia treatment is complete. Manual Lymphatic Drainage (MLD)  90minutes Treatment time: 12:45pm-2:15pm 
Area to decongest:   
[] UE 
        []  Right     []  Left      [] Trunk [x] LE   
         [x]  Right     [x]  Left      [x] Trunk Sequence used and effectiveness: [] Secondary/Primary sequence for upper extremity with / without trunk involvement 
 
 
 [] Secondary sequence for lower extremities with  trunk involvement. [x] Modifications were made to manual lymph drainage sequence to exclude cervical techniques secondary to medical history/age [x] Self MLD sequence/techniques/hand strokes Continued education this visit to improve techniques. Skin/wound care/debridement:  
Dermal Status: 
4/30/19 
 
 
 
3/27/2019 2/8/2019 4/30/19: 
R LE 
 
 
R LE  
 
 
 
L LE 
 
 
L LE Patient does have an ointment that was prescribed for her lower legs by MD, but she has not brought it in to the clinic. Recommended to bring in this ointment in for her visits, but to date has not. Patient concerned about some areas of lymphatic cysts on distal lower legs now on the L LE and previously on the R LE. Patient has more difficulty donning the straps and securing MLB at the ankle so this is probably the cause. Today skin overall improved with softness in upper thighs and volumes decreased. Upper/Lower extremity compression: Patient arrived wearing the R LE Solaris Ready Wrap velcro systems size XL/tall and not on the L LE so that clinic could see the lymphatic cysts. Educated patient on importance of maintaining her compression securely to prevent these areas to improve. The garments fit well and are comfortable to the patient. Continued education in the care of, precautions, and wear schedule of the garments. Instructed patient to wash the liner socks daily and to assess skin after removal of the garments. Patient voiced interest in ordering an additional pair of Solaris liner socks size XL and received them from [employer]. Patient prefers to wear her full coverage and supportive tennis shoes for compression on the foot and ankle but may be interested in ordering foot pieces in the future if needed. Continue to recommend that patient purchase a pair of compression kyle pants to wear with the velcro systems, but this has not occurred. Patient has all needed MLB supplies to use in the home as needed. Today patient wanted to be measured for knee highs. Patient shown the differences in circular knit and flat knit. Patient's swelling would benefit from the flat knit fabric and patient agrees. Patient measured for Elvarex Custom Flat Knit knee highs today while in the clinic.  She does not want to use her previous vendor so other options for purchase were presented for her to pursue. Patient to contact clinic if she is unable to secure custom knee highs on her own as she prefers to do. See paper chart for Elvarex measurement form. Kinesiotaping: Deferred Girth/Volume measurement: Full volumetric measurements taken today reveal a loss of 2580 mL in the uninvolved extremity and a loss of 1698 ml in the involved extremity since her last visit on 3/27/2019. Since evaluation on 2/8/19 patient has lost a total of 4899 ml on the L LE and 4997 ml on the R LE. Volumes today at the lowest they have been since seeking treatment. TOTAL TREATMENT 90 mins Circumferential Limb Measurements: 
Full volumetric measurements taken today. See measurements above. ASSESSMENT:  
Treatment effectiveness and tolerance: Patient has made gains with reduction in volumes of lower legs. She has recently been diagnosed with pneumonia so has not been able to utilize her vaso-pneumatic pump for her upper thigh reduction. Today volumes are stable and patient is ready to be measured for custom flat knit knee highs. Patient reports that she would like to pursue a different vendor and will work on this on her own with information given her today in the clinic. She will contact the clinic if her measurements need to be sent to vendor for her. Patient will need to secure her garments and return for follow up so that we can assess them and prepare her for discharge to the restorative phase of care soon. Progress toward goals: Patient met all STGs and LTG 2+3. Will continue LTG 1 and added LTG 4. See goal section of note for details. PLAN OF CARE:  
Changes to the plan of care: Plan of care to be updated as patient wanted to proceed with being measured for custom flat knit garments prior to discharge to the restorative phase of care. Frequency: []  2 times a week  
[]  Weekly []  Biweekly []  Monthly [x]  Return when she receives her custom flat knit garments for assessment. Kb Jessica, PTA, CLT Mabel Kimble, PT, CLT 
 
 
TREATMENT PLAN EFFECTIVE DATES:  
5/6/2019 TO 8/1/19 I have read the above plan of care for [facility]. I certify the above prescribed services are required by this patient and are medically necessary. The above plan of care has been developed in conjunction with the lymphedema/physical therapist.  
Physician Signature: ____________________________________________________ Dr. Paula Velasco Date: __________

## 2019-05-01 VITALS — WEIGHT: 293 LBS | BODY MASS INDEX: 47.09 KG/M2 | HEIGHT: 66 IN

## 2019-05-09 ENCOUNTER — OFFICE VISIT (OUTPATIENT)
Dept: INTERNAL MEDICINE CLINIC | Age: 69
End: 2019-05-09

## 2019-05-09 VITALS
DIASTOLIC BLOOD PRESSURE: 70 MMHG | HEIGHT: 66 IN | SYSTOLIC BLOOD PRESSURE: 122 MMHG | HEART RATE: 76 BPM | BODY MASS INDEX: 47.09 KG/M2 | TEMPERATURE: 98.1 F | OXYGEN SATURATION: 95 % | WEIGHT: 293 LBS

## 2019-05-09 DIAGNOSIS — I10 ESSENTIAL HYPERTENSION: ICD-10-CM

## 2019-05-09 DIAGNOSIS — E78.5 HYPERLIPIDEMIA, UNSPECIFIED HYPERLIPIDEMIA TYPE: ICD-10-CM

## 2019-05-09 DIAGNOSIS — J90 PLEURAL EFFUSION ON RIGHT: Primary | ICD-10-CM

## 2019-05-09 DIAGNOSIS — I89.0 LYMPHEDEMA OF BOTH LOWER EXTREMITIES: ICD-10-CM

## 2019-05-09 LAB
ANION GAP SERPL CALC-SCNC: 14 MMOL/L
BUN SERPL-MCNC: 28 MG/DL (ref 7–17)
CALCIUM SERPL-MCNC: 10.1 MG/DL (ref 8.4–10.2)
CHLORIDE SERPL-SCNC: 105 MMOL/L (ref 98–107)
CO2 SERPL-SCNC: 27 MMOL/L (ref 22–32)
CREAT SERPL-MCNC: 1 MG/DL (ref 0.7–1.2)
GLUCOSE SERPL-MCNC: 92 MG/DL (ref 65–105)
POTASSIUM SERPL-SCNC: 5.1 MMOL/L (ref 3.6–5)
SODIUM SERPL-SCNC: 146 MMOL/L (ref 137–145)

## 2019-05-09 NOTE — PROGRESS NOTES
Chief Complaint Patient presents with  Follow-up Depression Risk Factor Screening:  
 
3 most recent PHQ Screens 4/29/2019 Little interest or pleasure in doing things Several days Feeling down, depressed, irritable, or hopeless Several days Total Score PHQ 2 2 Trouble falling or staying asleep, or sleeping too much Several days Feeling tired or having little energy Several days Poor appetite, weight loss, or overeating Not at all Feeling bad about yourself - or that you are a failure or have let yourself or your family down Nearly every day Trouble concentrating on things such as school, work, reading, or watching TV Not at all Moving or speaking so slowly that other people could have noticed; or the opposite being so fidgety that others notice Not at all Thoughts of being better off dead, or hurting yourself in some way Not at all PHQ 9 Score 7 Functional Ability and Level of Safety: Activities of Daily Living ADL Assessment 10/2/2018 Feeding yourself No Help Needed Getting from bed to chair No Help Needed Getting dressed No Help Needed Bathing or showering No Help Needed Walk across the room (includes cane/walker) No Help Needed Using the telphone No Help Needed Taking your medications No Help Needed Preparing meals No Help Needed Managing money (expenses/bills) No Help Needed Moderately strenuous housework (laundry) No Help Needed Shopping for personal items (toiletries/medicines) No Help Needed Shopping for groceries No Help Needed Driving No Help Needed Climbing a flight of stairs No Help Needed Getting to places beyond walking distances No Help Needed Fall Risk Fall Risk Assessment, last 12 mths 5/9/2019 Able to walk? Yes Fall in past 12 months? No  
 
 
Abuse Screen Abuse Screening Questionnaire 10/2/2018 Do you ever feel afraid of your partner? Harleen Dixon Are you in a relationship with someone who physically or mentally threatens you? Severiano Organ Is it safe for you to go home? Mayur Lay Reviewed record in preparation for visit and have obtained necessary documentation. Identified pt with two pt identifiers(name and ). Chief Complaint Patient presents with  Follow-up Wt Readings from Last 3 Encounters:  
19 305 lb 3.2 oz (138.4 kg) 19 303 lb 3.2 oz (137.5 kg) 19 305 lb 9.6 oz (138.6 kg) Temp Readings from Last 3 Encounters:  
19 98.1 °F (36.7 °C) (Oral) 19 98.2 °F (36.8 °C) (Oral) 19 98.1 °F (36.7 °C) BP Readings from Last 3 Encounters:  
19 122/70  
19 138/76  
19 129/68 Pulse Readings from Last 3 Encounters:  
19 76  
19 74  
19 68 Coordination of Care Questionnaire: 
:  
 
1) Have you been to an emergency room, urgent care clinic since your last visit? No  
 
Hospitalized since your last visit? No  
 
 
     
 
2) Have you seen or consulted any other health care providers outside of [facility] since your last visit? Yes Flynn MORELOS Patient Care Team  
Patient Care Team: 
Kira Del Rio MD as PCP - General (Internal Medicine)

## 2019-05-09 NOTE — PROGRESS NOTES
Subjective:  
Alexis Grey is a 78 y.o. female Chief Complaint Patient presents with  Follow-up History of present illness:  Ms. Soledad Lima returns in follow up. Her breathing may be slightly better. She did not take her Bumex consistently, however. She was noted to have an elevated potassium last visit here and we hoped that the diuretic would improve this. That is being rechecked today. Her chest x-ray still reveals some minimal residual infiltrate in the right base associated with some minimal effusion. I do think it is getting better. She has been scheduled for a PET CT scan by pulmonology, which I cannot object to. It should help us identify whether this is tumor or not. With improvement in the overall picture and no recent requirement for thoracentesis again, I would suspect that this is a benign etiology, but I cannot be sure. She will see us again in three weeks' time and try to take her Bumex on a regular basis. If she goes out she does not take it in the morning, but she can certainly take it when she gets back and she has not been doing this. Other medicines remain the same. We will follow up regarding her potassium as well. Patient Active Problem List  
 Diagnosis Date Noted  Pleural effusion on right 04/29/2019 Priority: 1 - One  
 Hypertension Priority: 1 - One  
 Hyperlipemia 08/08/2017 Priority: 1 - One  Lymphedema of both lower extremities 11/06/2018 Priority: 2 - Two  Stasis dermatitis of both legs 10/02/2018 Priority: 2 - Two  
 IGT (impaired glucose tolerance) 08/08/2017 Priority: 2 - Two  Morbid obesity with BMI of 50.0-59.9, [facility] 08/08/2017 Priority: 2 - Two  LBBB (left bundle branch block) 11/08/2017 Priority: 3 - Three  MUMTAZ on CPAP 08/16/2017 Priority: 3 - Three  CKD (chronic kidney disease) stage 2, GFR 60-89 ml/min 08/16/2017 Priority: 3 - Three  OAB (overactive bladder) 08/08/2017 Priority: 3 - Three  PAF (paroxysmal atrial fibrillation) ([facility] 08/08/2017 Priority: 3 - Three  CAD (coronary artery disease) 08/08/2017 Priority: 3 - Three  Organic cardiac disease 08/08/2017 Priority: 3 - Three  DJD (degenerative joint disease) 08/08/2017 Priority: 3 - Three  GERD with stricture 08/08/2017 Priority: 3 - Three  Depression 08/08/2017 Priority: 3 - Three  PVD (peripheral vascular disease) ([facility] 10/02/2018 Priority: 4 - Four  Kidney stones 08/08/2017 Priority: 4 - Four  Family history of colon cancer 08/08/2017 Priority: 4 - Four  Edema extremities 08/08/2017 Priority: 4 - Four  Vitamin D deficiency 08/08/2017 Priority: 4 - Four  Retinal tear of right eye 08/08/2017 Priority: 5 - Five  Bilateral cataracts 08/08/2017 Priority: 5 - Five  Hematoma 08/08/2017 Priority: 5 - Five  
 Hair loss 08/08/2017 Priority: 5 - Five  Herpes zoster 08/08/2017 Priority: 5 - Five  Moderate major depression ([facility] 10/02/2018  PE (physical exam), annual 10/01/2018 Past Surgical History:  
Procedure Laterality Date  COLORECTAL SCRN; HI RISK IND  8/11/2014  DILATE ESOPHAGUS  8/11/2014  HX APPENDECTOMY  HX HYSTERECTOMY  HX ORTHOPAEDIC Right   
 thigh/hip hematoma-4 times  HX OTHER SURGICAL    
 lap sleeve gastrectomy  HX SALPINGO-OOPHORECTOMY  HX UROLOGICAL  3/6/14 CYSTOSCOPY, RIGHT URETEROSCOPY WITH HIGH POWERED HOLMIUM LASER, WITH RIGHT URETERAL STENT PLACEMENT  RI EGD TRANSORAL BIOPSY SINGLE/MULTIPLE  8/11/2014 No Known Allergies Family History Problem Relation Age of Onset  Colon Cancer Father  Colon Cancer Paternal Grandmother  Dementia Mother  Hypertension Mother  Sleep Apnea Mother Social History Socioeconomic History  Marital status:  Spouse name: Not on file  Number of children: Not on file  Years of education: Not on file  Highest education level: Not on file Occupational History  Not on file Social Needs  Financial resource strain: Not on file  Food insecurity:  
  Worry: Not on file Inability: Not on file  Transportation needs:  
  Medical: Not on file Non-medical: Not on file Tobacco Use  Smoking status: Never Smoker  Smokeless tobacco: Never Used Substance and Sexual Activity  Alcohol use: No  
 Drug use: No  
 Sexual activity: Not on file Lifestyle  Physical activity:  
  Days per week: Not on file Minutes per session: Not on file  Stress: Not on file Relationships  Social connections:  
  Talks on phone: Not on file Gets together: Not on file Attends Oriental orthodox service: Not on file Active member of club or organization: Not on file Attends meetings of clubs or organizations: Not on file Relationship status: Not on file  Intimate partner violence:  
  Fear of current or ex partner: Not on file Emotionally abused: Not on file Physically abused: Not on file Forced sexual activity: Not on file Other Topics Concern  Not on file Social History Narrative  Not on file Outpatient Medications Marked as Taking for the 5/9/19 encounter (Office Visit) with Mattie Nuñez MD  
Medication Sig Dispense Refill  simvastatin (ZOCOR) 20 mg tablet TAKE 1 TABLET BY MOUTH EVERY EVENING FOR CHOLESTEROL 90 Tab 0  
 nabumetone (RELAFEN) 500 mg tablet Take 1 Tab by mouth two (2) times daily as needed for Pain. 60 Tab 1  
 bumetanide (BUMEX) 1 mg tablet Take 1 mg by mouth daily.  buPROPion SR (WELLBUTRIN SR) 150 mg SR tablet take 1 tablet by mouth twice a day 180 Tab 1  
 lisinopril (PRINIVIL, ZESTRIL) 20 mg tablet Take  by mouth daily.  metoprolol tartrate (LOPRESSOR) 50 mg tablet take 1 tablet by mouth twice a day 180 Tab 4  calcium carbonate-vitamin D3 (CALTRATE WITH VITAMIN D3) 600 mg(1,500mg) -800 unit tab Take 1 Tab by mouth daily.  cyanocobalamin (VITAMIN B-12) 500 mcg tablet Take 500 mcg by mouth daily.  multivitamin (ONE A DAY) tablet Take 2 Tabs by mouth daily.  aspirin delayed-release 81 mg tablet Take 81 mg by mouth daily. Review of Systems Constitutional:  She denies fever, weight loss, sweats or fatigue. HEENT:  No blurred or double vision, headache or dizziness. No difficulty with swallowing, taste, speech or smell. Respiratory:  No cough, wheezing. Some shortness of breath. No sputum production. Cardiac:  Denies chest pain, palpitations, unexplained indigestion, syncope, edema, PND or orthopnea. GI:  No changes in bowel movements, no abdominal pain, no bloating, anorexia, nausea, vomiting or heartburn. :  No frequency or dysuria. Denies incontinence. Extremities:   swelling. Skin:  No recent rashes or mole changes. Neurological:  No numbness, tingling, burning paresthesias or loss of motor strength. No syncope, dizziness, frequent headaches or memory loss. Objective:  
 
Vitals:  
 05/09/19 1351 BP: 122/70 Pulse: 76 Temp: 98.1 °F (36.7 °C) TempSrc: Oral  
SpO2: 95% Weight: 305 lb 3.2 oz (138.4 kg) Height: 5' 6\" (1.676 m) Body mass index is 49.26 kg/m². Physical Examination:  
           General Appearance: Morbidly obese no acute  distress. HEENT:   
 Ears:  The TMs and ear canals were clear. Eyes:  The pupillary responses were normal.  Extraocular muscle function intact. Lids and conjunctiva not injected. Neck:  Supple without thyromegaly or adenopathy. No JVD noted. Lungs:  Decreased BS right base Cardiac:  Regular rate and rhythm without murmur. GI: nontender w/o mass. Normal BS's. Extremities:  No clubbing, cyanosis or edema. Skin:  No rash or unusual mole changes noted.    
Neurological:  Grossly normal.     
 
 Assessment/Plan:  
Impressions: ICD-10-CM ICD-9-CM 1. Pleural effusion on right J90 511.9 XR CHEST PA LAT 2. Essential hypertension B81 800.1 METABOLIC PANEL, BASIC 3. Hyperlipidemia, unspecified hyperlipidemia type I33.2 251.4 METABOLIC PANEL, BASIC 4. Lymphedema of both lower extremities N06.7 047.4 METABOLIC PANEL, BASIC Plan: 1. Continue present meds 2. Discussed lifestyle modifications including Na restriction, low carb/fat diet, weight reduction and exercise (at least a walking program). Follow-up and Dispositions · Return in about 3 weeks (around 5/30/2019). Orders Placed This Encounter  XR CHEST PA LAT Standing Status:   Future Number of Occurrences:   1 Standing Expiration Date:   5/9/2020 Order Specific Question:   Reason for Exam  
  Answer:   interval change  METABOLIC PANEL, BASIC ([facility]) Emilee Jones MD

## 2019-05-15 NOTE — TELEPHONE ENCOUNTER
Patient ask for two refills to be sent to [facility]    Requested Prescriptions     Pending Prescriptions Disp Refills    lisinopril (PRINIVIL, ZESTRIL) 20 mg tablet 90 Tab 3     Sig: Take 1 Tab by mouth daily.     buPROPion SR (WELLBUTRIN SR) 150 mg SR tablet 180 Tab 1     Sig: take 1 tablet by mouth twice a day

## 2019-05-16 RX ORDER — METOPROLOL TARTRATE 50 MG/1
TABLET ORAL
Qty: 180 TAB | Refills: 0 | Status: SHIPPED | OUTPATIENT
Start: 2019-05-16 | End: 2019-08-05 | Stop reason: SDUPTHER

## 2019-05-16 RX ORDER — LISINOPRIL 20 MG/1
20 TABLET ORAL DAILY
Qty: 90 TAB | Refills: 1 | Status: SHIPPED | OUTPATIENT
Start: 2019-05-16 | End: 2019-11-18 | Stop reason: SDUPTHER

## 2019-05-16 RX ORDER — BUPROPION HYDROCHLORIDE 150 MG/1
TABLET, EXTENDED RELEASE ORAL
Qty: 180 TAB | Refills: 1 | Status: SHIPPED | OUTPATIENT
Start: 2019-05-16 | End: 2019-11-25 | Stop reason: SDUPTHER

## 2019-05-16 RX ORDER — BUPROPION HYDROCHLORIDE 150 MG/1
TABLET, EXTENDED RELEASE ORAL
Qty: 180 TAB | Refills: 1 | Status: CANCELLED | OUTPATIENT
Start: 2019-05-16

## 2019-05-16 RX ORDER — LISINOPRIL 20 MG/1
TABLET ORAL DAILY
Status: CANCELLED | OUTPATIENT
Start: 2019-05-16

## 2019-05-16 NOTE — TELEPHONE ENCOUNTER
PCP: Cindy Hurtado MD    Last appt: 5/9/2019  Future Appointments   Date Time Provider Faye Lester   5/17/2019  1:00 PM [facility] PET INJ 1 [facility]   5/17/2019  2:00 PM [facility] PET 1 [facility]   5/30/2019  1:45 PM Apolinar Logan MD 3 Randy Dennis       Requested Prescriptions     Pending Prescriptions Disp Refills    lisinopril (PRINIVIL, ZESTRIL) 20 mg tablet 90 Tab 3     Sig: Take 1 Tab by mouth daily.     buPROPion SR (WELLBUTRIN SR) 150 mg SR tablet 180 Tab 1     Sig: take 1 tablet by mouth twice a day       Prior labs and Blood pressures:  BP Readings from Last 3 Encounters:   05/09/19 122/70   04/29/19 138/76   04/18/19 129/68     Lab Results   Component Value Date/Time    Sodium 146 (H) 05/09/2019 02:46 PM    Potassium 5.1 (H) 05/09/2019 02:46 PM    Chloride 105 05/09/2019 02:46 PM    CO2 27.0 05/09/2019 02:46 PM    Anion gap 14 05/09/2019 02:46 PM    Glucose 92 05/09/2019 02:46 PM    BUN 28.0 (H) 05/09/2019 02:46 PM    Creatinine 1.0 05/09/2019 02:46 PM    BUN/Creatinine ratio 25 04/29/2019 11:12 AM    GFR est AA >60 05/09/2019 02:46 PM    GFR est non-AA 55 05/09/2019 02:46 PM    Calcium 10.1 05/09/2019 02:46 PM     Lab Results   Component Value Date/Time    Hemoglobin A1c 5.8 (H) 04/29/2019 11:11 AM    Hemoglobin A1c (POC) 5.2 09/28/2018 12:00 PM     Lab Results   Component Value Date/Time    Cholesterol, total 156 04/29/2019 11:12 AM    Cholesterol (POC) 151.0 09/28/2018 12:00 PM    HDL Cholesterol 64 04/29/2019 11:12 AM    HDL Cholesterol (POC) 67.0 09/28/2018 12:00 PM    LDL Cholesterol (POC) 52.6 09/28/2018 12:00 PM    LDL, calculated 59 04/29/2019 11:12 AM    VLDL 33 04/29/2019 11:12 AM    Triglyceride 166 04/29/2019 11:12 AM    Triglycerides (POC) 157.0 09/28/2018 12:00 PM    CHOL/HDL Ratio 2 04/29/2019 11:12 AM     No results found for: VITD3, XQVID2, XQVID3, XQVID, VD3RIA    No results found for: TSH, TSH2, TSH3, TSHP, TSHEXT

## 2019-05-16 NOTE — TELEPHONE ENCOUNTER
Patient calling to request refill     Requested Prescriptions     Pending Prescriptions Disp Refills    buPROPion SR (WELLBUTRIN SR) 150 mg SR tablet 180 Tab 1    lisinopril (PRINIVIL, ZESTRIL) 20 mg tablet       Sig: Take  by mouth daily.

## 2019-05-17 ENCOUNTER — HOSPITAL ENCOUNTER (OUTPATIENT)
Dept: PET IMAGING | Age: 69
Discharge: HOME | End: 2019-05-17
Attending: PHYSICIAN ASSISTANT
Payer: MEDICARE

## 2019-05-17 VITALS — WEIGHT: 293 LBS | HEIGHT: 66 IN | BODY MASS INDEX: 47.09 KG/M2

## 2019-05-17 DIAGNOSIS — R91.1 PULMONARY NODULE: ICD-10-CM

## 2019-05-17 PROCEDURE — A9552 F18 FDG: HCPCS

## 2019-05-17 RX ORDER — SODIUM CHLORIDE 0.9 % (FLUSH) 0.9 %
10 SYRINGE (ML) INJECTION
Status: COMPLETED | OUTPATIENT
Start: 2019-05-17 | End: 2019-05-17

## 2019-05-17 RX ADMIN — Medication 10 ML: at 12:55

## 2019-05-20 LAB
BACTERIA SPEC CULT: NORMAL
SERVICE CMNT-IMP: NORMAL

## 2019-05-20 NOTE — PROGRESS NOTES
PHI verified. Patient informed that Dr. Patricia Rendon has reviewed lab results and stated Blood sugar, kidney and K+ normal.  No change in medications or treatment.

## 2019-06-03 ENCOUNTER — OFFICE VISIT (OUTPATIENT)
Dept: INTERNAL MEDICINE CLINIC | Age: 69
End: 2019-06-03

## 2019-06-03 VITALS
DIASTOLIC BLOOD PRESSURE: 74 MMHG | BODY MASS INDEX: 48.82 KG/M2 | HEIGHT: 65 IN | OXYGEN SATURATION: 97 % | TEMPERATURE: 98.3 F | SYSTOLIC BLOOD PRESSURE: 136 MMHG | HEART RATE: 75 BPM | WEIGHT: 293 LBS

## 2019-06-03 DIAGNOSIS — R73.02 IGT (IMPAIRED GLUCOSE TOLERANCE): ICD-10-CM

## 2019-06-03 DIAGNOSIS — I10 ESSENTIAL HYPERTENSION: ICD-10-CM

## 2019-06-03 DIAGNOSIS — I89.0 LYMPHEDEMA OF BOTH LOWER EXTREMITIES: ICD-10-CM

## 2019-06-03 DIAGNOSIS — M70.22 OLECRANON BURSITIS OF LEFT ELBOW: ICD-10-CM

## 2019-06-03 DIAGNOSIS — I48.0 PAF (PAROXYSMAL ATRIAL FIBRILLATION) (HCC): ICD-10-CM

## 2019-06-03 DIAGNOSIS — J90 PLEURAL EFFUSION ON RIGHT: Primary | ICD-10-CM

## 2019-06-03 LAB
ACID FAST STN SPEC: NEGATIVE
ANION GAP SERPL CALC-SCNC: 11 MMOL/L
BUN SERPL-MCNC: 39 MG/DL (ref 7–17)
CALCIUM SERPL-MCNC: 10.6 MG/DL (ref 8.4–10.2)
CHLORIDE SERPL-SCNC: 105 MMOL/L (ref 98–107)
CO2 SERPL-SCNC: 29 MMOL/L (ref 22–32)
CREAT SERPL-MCNC: 1 MG/DL (ref 0.7–1.2)
GLUCOSE SERPL-MCNC: 91 MG/DL (ref 65–105)
MYCOBACTERIUM SPEC QL CULT: NEGATIVE
POTASSIUM SERPL-SCNC: 5.2 MMOL/L (ref 3.6–5)
SODIUM SERPL-SCNC: 145 MMOL/L (ref 137–145)
SPECIMEN PREPARATION: NORMAL
SPECIMEN SOURCE: NORMAL

## 2019-06-03 NOTE — PROGRESS NOTES
Reviewed record in preparation for visit and have obtained necessary documentation. Identified pt with two pt identifiers(name and ). Chief Complaint   Patient presents with    Follow-up    Elbow Pain        Coordination of Care Questionnaire:  :     1) Have you been to an emergency room, urgent care clinic since your last visit? No     Hospitalized since your last visit? No         2) Have you seen or consulted any other health care providers outside of [address] [place] [address] since your last visit?   Yes [facility]

## 2019-06-03 NOTE — PROGRESS NOTES
Subjective:   Byron Cassidy is a 78 y.o. female      Chief Complaint   Patient presents with    Follow-up    Elbow Pain        History of present illness:  Ms. Jose J Dee returns in follow up. She is feeling better. She has less shortness of breath with exertion. Her lower extremity edema is moderate, but taken care of with the wraps from the lymphedema clinic. She still has an occasional blister on the skin, but no significant stasis dermatitis or erythema and no fever, chills or purulent drainage. She has developed swelling of her left elbow consistent with an olecranon bursitis without heat, erythema or specific tenderness. I am going to ask her to see Dr. Jung De Los Santos about this. With regard to her pulmonary situation, as noted, she has had improvement in her breathing. Her chest xray today reveals minimal residual infiltrate in the right lower lobe. I think this is gradually improving. She had a residual effusion. She did undergo PET CT scan recently, which revealed bilateral increased tracer activity in the lower lobes with atelectasis. The nodule that she had in her left lung seems to have reduced in size from 8 mm to 5 mm. I think she is stable and improving and will recheck her again in a month's time with a final chest xray. If the infiltrate persists, we may want to repeat the CT scan that she had in the middle of April at the three month interval to make sure nothing else is going on. We have scheduled her for a month here, but she may be changing practices since I am retiring. If that transpires we will transfer the records and they can follow up on the chest xray and CT scan at that ponit. She denies new GI or  symptoms.         Patient Active Problem List    Diagnosis Date Noted    Pleural effusion on right 04/29/2019     Priority: 1 - One    Hypertension      Priority: 1 - One    Hyperlipemia 08/08/2017     Priority: 1 - One    Lymphedema of both lower extremities 11/06/2018     Priority: 2 - Two    Stasis dermatitis of both legs 10/02/2018     Priority: 2 - Two    IGT (impaired glucose tolerance) 08/08/2017     Priority: 2 - Two    Morbid obesity with BMI of 50.0-59.9, adult ([address] 08/08/2017     Priority: 2 - Two    LBBB (left bundle branch block) 11/08/2017     Priority: 3 - Three    MUMTAZ on CPAP 08/16/2017     Priority: 3 - Three    CKD (chronic kidney disease) stage 2, GFR 60-89 ml/min 08/16/2017     Priority: 3 - Three    OAB (overactive bladder) 08/08/2017     Priority: 3 - Three    PAF (paroxysmal atrial fibrillation) ([facility] 08/08/2017     Priority: 3 - Three    CAD (coronary artery disease) 08/08/2017     Priority: 3 - Three    Organic cardiac disease 08/08/2017     Priority: 3 - Three    DJD (degenerative joint disease) 08/08/2017     Priority: 3 - Three    GERD with stricture 08/08/2017     Priority: 3 - Three    Depression 08/08/2017     Priority: 3 - Three    PVD (peripheral vascular disease) ([address] 10/02/2018     Priority: 4 - Four    Kidney stones 08/08/2017     Priority: 4 - Four    Family history of colon cancer 08/08/2017     Priority: 4 - Four    Edema extremities 08/08/2017     Priority: 4 - Four    Vitamin D deficiency 08/08/2017     Priority: 4 - Four    Retinal tear of right eye 08/08/2017     Priority: 5 - Five    Bilateral cataracts 08/08/2017     Priority: 5 - Five    Hematoma 08/08/2017     Priority: 5 - Five    Hair loss 08/08/2017     Priority: 5 - Five    Herpes zoster 08/08/2017     Priority: 5 - Five    Olecranon bursitis of left elbow 06/03/2019    Moderate major depression ([address] 10/02/2018    PE (physical exam), annual 10/01/2018      Past Surgical History:   Procedure Laterality Date    COLORECTAL SCRN; HI RISK IND  8/11/2014         DILATE ESOPHAGUS  8/11/2014         HX APPENDECTOMY      HX HYSTERECTOMY      HX ORTHOPAEDIC Right     thigh/hip hematoma-4 times    HX OTHER SURGICAL      lap sleeve gastrectomy    HX SALPINGO-OOPHORECTOMY  HX UROLOGICAL  3/6/14    CYSTOSCOPY, RIGHT URETEROSCOPY WITH HIGH POWERED HOLMIUM LASER, WITH RIGHT URETERAL STENT PLACEMENT     NE EGD TRANSORAL BIOPSY SINGLE/MULTIPLE  8/11/2014           No Known Allergies   Family History   Problem Relation Age of Onset    Colon Cancer Father     Colon Cancer Paternal Grandmother     Dementia Mother     Hypertension Mother     Sleep Apnea Mother       Social History     Socioeconomic History    Marital status:      Spouse name: Not on file    Number of children: Not on file    Years of education: Not on file    Highest education level: Not on file   Occupational History    Not on file   Social Needs    Financial resource strain: Not on file    Food insecurity:     Worry: Not on file     Inability: Not on file    Transportation needs:     Medical: Not on file     Non-medical: Not on file   Tobacco Use    Smoking status: Never Smoker    Smokeless tobacco: Never Used   Substance and Sexual Activity    Alcohol use: No    Drug use: No    Sexual activity: Not on file   Lifestyle    Physical activity:     Days per week: Not on file     Minutes per session: Not on file    Stress: Not on file   Relationships    Social connections:     Talks on phone: Not on file     Gets together: Not on file     Attends Zoroastrianism service: Not on file     Active member of club or organization: Not on file     Attends meetings of clubs or organizations: Not on file     Relationship status: Not on file    Intimate partner violence:     Fear of current or ex partner: Not on file     Emotionally abused: Not on file     Physically abused: Not on file     Forced sexual activity: Not on file   Other Topics Concern    Not on file   Social History Narrative    Not on file     Outpatient Medications Marked as Taking for the 6/3/19 encounter (Office Visit) with Lupillo Olvera MD   Medication Sig Dispense Refill    lisinopril (PRINIVIL, ZESTRIL) 20 mg tablet Take 1 Tab by mouth daily. 90 Tab 1    buPROPion SR (WELLBUTRIN SR) 150 mg SR tablet take 1 tablet by mouth twice a day 180 Tab 1    metoprolol tartrate (LOPRESSOR) 50 mg tablet TAKE 1 TABLET BY MOUTH TWICE DAILY 180 Tab 0    simvastatin (ZOCOR) 20 mg tablet TAKE 1 TABLET BY MOUTH EVERY EVENING FOR CHOLESTEROL 90 Tab 0    nabumetone (RELAFEN) 500 mg tablet Take 1 Tab by mouth two (2) times daily as needed for Pain. 60 Tab 1    bumetanide (BUMEX) 1 mg tablet Take 1 mg by mouth daily.  calcium carbonate-vitamin D3 (CALTRATE WITH VITAMIN D3) 600 mg(1,500mg) -800 unit tab Take 1 Tab by mouth daily.  cyanocobalamin (VITAMIN B-12) 500 mcg tablet Take 500 mcg by mouth daily.  multivitamin (ONE A DAY) tablet Take 2 Tabs by mouth daily.  aspirin delayed-release 81 mg tablet Take 81 mg by mouth daily. Review of Systems              Constitutional:  She denies fever, weight loss, sweats or fatigue. HEENT:  No blurred or double vision, headache or dizziness. No difficulty with swallowing, taste, speech or smell. Respiratory:  No cough, wheezing or shortness of breath. No sputum production. Cardiac:  Denies chest pain, palpitations, unexplained indigestion, syncope, edema, PND or orthopnea. GI:  No changes in bowel movements, no abdominal pain, no bloating, anorexia, nausea, vomiting or heartburn. :  No frequency or dysuria. Denies incontinence. Extremities: swelling. Skin:  No recent rashes or mole changes. Neurological:  No numbness, tingling, burning paresthesias or loss of motor strength. No syncope, dizziness, frequent headaches or memory loss. Objective:     Vitals:    06/03/19 1340   BP: 136/74   Pulse: 75   Temp: 98.3 °F (36.8 °C)   TempSrc: Oral   SpO2: 97%   Weight: 304 lb 12.8 oz (138.3 kg)   Height: 5' 5\" (1.651 m)   PainSc:   9   PainLoc: Knee       Body mass index is 50.72 kg/m².    Physical Examination:              General Appearance:  Well-developed, well-nourished, no acute  distress. HEENT:     Ears:  The TMs and ear canals were clear. Eyes:  The pupillary responses were normal.  Extraocular muscle function intact. Lids and conjunctiva not injected. Neck:  Supple without thyromegaly or adenopathy. No JVD noted. Lungs:  Clear to auscultation and percussion. Cardiac:  Regular rate and rhythm without murmur. GI: nontender w/o mass. Normal BS's. Extremities:  Lymphedema reduced  Skin:  No rash or unusual mole changes noted. Neurological:  Grossly normal.            Assessment/Plan:   Impressions:      ICD-10-CM ICD-9-CM    1. Pleural effusion on right J90 511.9 XR CHEST PA LAT   2. Essential hypertension M49 235.9 METABOLIC PANEL, BASIC   3. Olecranon bursitis of left elbow M70.22 726.33 REFERRAL TO ORTHOPEDICS   4. Lymphedema of both lower extremities L71.3 298.5 METABOLIC PANEL, BASIC   5. IGT (impaired glucose tolerance) S41.52 980.56 METABOLIC PANEL, BASIC   6. PAF (paroxysmal atrial fibrillation) ([facility]) O20.8 788.97 METABOLIC PANEL, BASIC        Plan:  1. Continue present meds  2. Discussed lifestyle modifications including Na restriction, low carb/fat diet, weight reduction and exercise (at least a walking program). Follow-up and Dispositions    · Return in about 1 month (around 7/3/2019).            Orders Placed This Encounter    XR CHEST PA LAT     Standing Status:   Future     Number of Occurrences:   1     Standing Expiration Date:   6/3/2020     Order Specific Question:   Reason for Exam     Answer:   pleural effusion    METABOLIC PANEL, BASIC [facility]   [address] [facility] [address]     Referral Priority:   Routine     Referral Type:   Consultation     Referral Reason:   Specialty Services Required     Referred to Provider:   Sariah Read MD     Number of Visits Requested:   1       Chad Rangel MD

## 2019-06-04 RX ORDER — SIMVASTATIN 20 MG/1
TABLET, FILM COATED ORAL
Qty: 90 TAB | Refills: 0 | Status: SHIPPED | OUTPATIENT
Start: 2019-06-04 | End: 2019-09-09 | Stop reason: SDUPTHER

## 2019-06-06 NOTE — PROGRESS NOTES
Left message at phone number(s) listed in chart for patient to call the office at [phone] to discuss lab results.

## 2019-06-06 NOTE — PROGRESS NOTES
PHI verified.  Patient informed that Dr. Anali Mcginnis has reviewed lab results and stated Blood sugar, kidney and K+ normal.

## 2019-07-05 ENCOUNTER — OFFICE VISIT (OUTPATIENT)
Dept: INTERNAL MEDICINE CLINIC | Age: 69
End: 2019-07-05

## 2019-07-05 VITALS
TEMPERATURE: 97.9 F | SYSTOLIC BLOOD PRESSURE: 130 MMHG | WEIGHT: 293 LBS | DIASTOLIC BLOOD PRESSURE: 68 MMHG | BODY MASS INDEX: 48.82 KG/M2 | RESPIRATION RATE: 24 BRPM | HEART RATE: 69 BPM | HEIGHT: 65 IN | OXYGEN SATURATION: 96 %

## 2019-07-05 DIAGNOSIS — I48.0 PAF (PAROXYSMAL ATRIAL FIBRILLATION) (HCC): ICD-10-CM

## 2019-07-05 DIAGNOSIS — I10 ESSENTIAL HYPERTENSION: ICD-10-CM

## 2019-07-05 DIAGNOSIS — J90 PLEURAL EFFUSION ON RIGHT: Primary | ICD-10-CM

## 2019-07-05 DIAGNOSIS — I89.0 LYMPHEDEMA OF BOTH LOWER EXTREMITIES: ICD-10-CM

## 2019-07-05 NOTE — PROGRESS NOTES
Subjective:   Daphnie Rivera is a 78 y.o. female      Chief Complaint   Patient presents with    Follow-up        History of present illness:  Ms. Henny Cook returns in follow up. She has resolved her cough and feels back to her baseline. Her chest xray is actually clear today so I do not think we need to pursue another CT scan, etc.  She is unclear as to where she is going to get her follow up care, but we have made her a follow up appointment for three to four months here. I have also given her some names of other physicians in the [facility] system that she may pursue. She actually wants someone with office experience, so Dr. Melly Anna may not work well with her, even though she is eminently qualified. If we get another new physician she might consider that route as well. She denies problems with shortness of breath over her baseline. Her lymphedema is well controlled with her wraps. Her last BUN was 39, but she is not taking much in the way of Bumex and I do not think that needs to be pursued much further since her creatinine was also only 1 at that time. She denies other new cardiorespiratory, GI or  symptoms.         Patient Active Problem List    Diagnosis Date Noted    Pleural effusion on right 04/29/2019     Priority: 1 - One    Hypertension      Priority: 1 - One    Hyperlipemia 08/08/2017     Priority: 1 - One    Lymphedema of both lower extremities 11/06/2018     Priority: 2 - Two    Stasis dermatitis of both legs 10/02/2018     Priority: 2 - Two    IGT (impaired glucose tolerance) 08/08/2017     Priority: 2 - Two    Morbid obesity with BMI of 50.0-59.9, adult ([facility] 08/08/2017     Priority: 2 - Two    LBBB (left bundle branch block) 11/08/2017     Priority: 3 - Three    MUMTAZ on CPAP 08/16/2017     Priority: 3 - Three    CKD (chronic kidney disease) stage 2, GFR 60-89 ml/min 08/16/2017     Priority: 3 - Three    OAB (overactive bladder) 08/08/2017     Priority: 3 - Three    [facility]) [facility] 08/08/2017     Priority: 3 - Three    CAD (coronary artery disease) 08/08/2017     Priority: 3 - Three    Organic cardiac disease 08/08/2017     Priority: 3 - Three    DJD (degenerative joint disease) 08/08/2017     Priority: 3 - Three    GERD with stricture 08/08/2017     Priority: 3 - Three    Depression 08/08/2017     Priority: 3 - Three    PVD (peripheral vascular disease) ([facility] 10/02/2018     Priority: 4 - Four    Kidney stones 08/08/2017     Priority: 4 - Four    Family history of colon cancer 08/08/2017     Priority: 4 - Four    Edema extremities 08/08/2017     Priority: 4 - Four    Vitamin D deficiency 08/08/2017     Priority: 4 - Four    Retinal tear of right eye 08/08/2017     Priority: 5 - Five    Bilateral cataracts 08/08/2017     Priority: 5 - Five    Hematoma 08/08/2017     Priority: 5 - Five    Hair loss 08/08/2017     Priority: 5 - Five    Herpes zoster 08/08/2017     Priority: 5 - Five    Olecranon bursitis of left elbow 06/03/2019    Moderate major depression ([facility] 10/02/2018    PE (physical exam), annual 10/01/2018      Past Surgical History:   Procedure Laterality Date    COLORECTAL SCRN; HI RISK IND  8/11/2014         DILATE ESOPHAGUS  8/11/2014         HX APPENDECTOMY      HX HYSTERECTOMY      HX ORTHOPAEDIC Right     thigh/hip hematoma-4 times    HX OTHER SURGICAL      lap sleeve gastrectomy    HX SALPINGO-OOPHORECTOMY      HX UROLOGICAL  3/6/14    CYSTOSCOPY, RIGHT URETEROSCOPY WITH HIGH POWERED HOLMIUM LASER, WITH RIGHT URETERAL STENT PLACEMENT     PA EGD TRANSORAL BIOPSY SINGLE/MULTIPLE  8/11/2014           No Known Allergies   Family History   Problem Relation Age of Onset    Colon Cancer Father     Colon Cancer Paternal Grandmother     Dementia Mother     Hypertension Mother     Sleep Apnea Mother       Social History     Socioeconomic History    Marital status:      Spouse name: Not on file    Number of children: Not on file    Years of education: Not on file    Highest education level: Not on file   Occupational History    Not on file   Social Needs    Financial resource strain: Not on file    Food insecurity:     Worry: Not on file     Inability: Not on file    Transportation needs:     Medical: Not on file     Non-medical: Not on file   Tobacco Use    Smoking status: Never Smoker    Smokeless tobacco: Never Used   Substance and Sexual Activity    Alcohol use: No    Drug use: No    Sexual activity: Not on file   Lifestyle    Physical activity:     Days per week: Not on file     Minutes per session: Not on file    Stress: Not on file   Relationships    Social connections:     Talks on phone: Not on file     Gets together: Not on file     Attends Adventism service: Not on file     Active member of club or organization: Not on file     Attends meetings of clubs or organizations: Not on file     Relationship status: Not on file    Intimate partner violence:     Fear of current or ex partner: Not on file     Emotionally abused: Not on file     Physically abused: Not on file     Forced sexual activity: Not on file   Other Topics Concern    Not on file   Social History Narrative    Not on file     Outpatient Medications Marked as Taking for the 7/5/19 encounter (Office Visit) with Amanda De Oliveira MD   Medication Sig Dispense Refill    simvastatin (ZOCOR) 20 mg tablet TAKE 1 TABLET BY MOUTH EVERY EVENING FOR CHOLESTEROL 90 Tab 0    lisinopril (PRINIVIL, ZESTRIL) 20 mg tablet Take 1 Tab by mouth daily. 90 Tab 1    buPROPion SR (WELLBUTRIN SR) 150 mg SR tablet take 1 tablet by mouth twice a day 180 Tab 1    metoprolol tartrate (LOPRESSOR) 50 mg tablet TAKE 1 TABLET BY MOUTH TWICE DAILY 180 Tab 0    nabumetone (RELAFEN) 500 mg tablet Take 1 Tab by mouth two (2) times daily as needed for Pain. 60 Tab 1    bumetanide (BUMEX) 1 mg tablet Take 1 mg by mouth daily.       calcium carbonate-vitamin D3 (CALTRATE WITH VITAMIN D3) 600 mg(1,500mg) -800 unit tab Take 1 Tab by mouth daily.  cyanocobalamin (VITAMIN B-12) 500 mcg tablet Take 500 mcg by mouth daily.  multivitamin (ONE A DAY) tablet Take 2 Tabs by mouth daily.  aspirin delayed-release 81 mg tablet Take 81 mg by mouth daily. Review of Systems              Constitutional:  She denies fever, weight loss, sweats or fatigue. HEENT:  No blurred or double vision, headache or dizziness. No difficulty with swallowing, taste, speech or smell. Respiratory:  No cough, wheezing or shortness of breath. No sputum production. Cardiac:  Denies chest pain, palpitations, unexplained indigestion, syncope, edema, PND or orthopnea. GI:  No changes in bowel movements, no abdominal pain, no bloating, anorexia, nausea, vomiting or heartburn. :  No frequency or dysuria. Denies incontinence. Extremities:  Lymphedema with wraps  Skin:  No recent rashes or mole changes. Neurological:  No numbness, tingling, burning paresthesias or loss of motor strength. No syncope, dizziness, frequent headaches or memory loss. Objective:     Vitals:    07/05/19 1352   BP: 130/68   Pulse: 69   Resp: 24   Temp: 97.9 °F (36.6 °C)   TempSrc: Oral   SpO2: 96%   Weight: 300 lb 3.2 oz (136.2 kg)   Height: 5' 5\" (1.651 m)   PainSc:   7   PainLoc: Knee       Body mass index is 49.96 kg/m². Physical Examination:              General Appearance:  Well-developed, well-nourished, no acute  distress. HEENT:     Ears:  The TMs and ear canals were clear. Eyes:  The pupillary responses were normal.  Extraocular muscle function intact. Lids and conjunctiva not injected. Neck:  Supple without thyromegaly or adenopathy. No JVD noted. Lungs:  Clear to auscultation and percussion. Cardiac:  Regular rate and rhythm without murmur. GI: nontender w/o mass. Normal BS's. Extremities: lymphedema improved. Skin:  No rash or unusual mole changes noted.   Neurological: Grossly normal.            Assessment/Plan:   Impressions:      ICD-10-CM ICD-9-CM    1. Pleural effusion on right J90 511.9 XR CHEST PA LAT   2. Essential hypertension I10 401.9 XR CHEST PA LAT   3. Lymphedema of both lower extremities I89.0 457. 1 XR CHEST PA LAT   4. PAF (paroxysmal atrial fibrillation) (H[facility]) I48.0 427.31 XR CHEST PA LAT        Plan:  1. Continue present meds  2. Discussed lifestyle modifications including Na restriction, low carb/fat diet, weight reduction and exercise (at least a walking program). Follow-up and Dispositions    · Return in about 4 months (around 11/5/2019).            Orders Placed This Encounter    XR CHEST PA LAT     Standing Status:   Future     Number of Occurrences:   1     Standing Expiration Date:   7/5/2020     Order Specific Question:   Reason for Exam     Answer:   interval change       Vish Hurd MD

## 2019-07-05 NOTE — PROGRESS NOTES
Reviewed record in preparation for visit and have obtained necessary documentation. Identified pt with two pt identifiers(name and ). Chief Complaint   Patient presents with    Follow-up        Coordination of Care Questionnaire:  :     1) Have you been to an emergency room, urgent care clinic since your last visit? No     Hospitalized since your last visit? No               2) Have you seen or consulted any other health care providers outside of [address] [place] [address] since your last visit?   No

## 2019-08-06 RX ORDER — METOPROLOL TARTRATE 50 MG/1
TABLET ORAL
Qty: 180 TAB | Refills: 0 | Status: SHIPPED | OUTPATIENT
Start: 2019-08-06 | End: 2019-08-19 | Stop reason: SDUPTHER

## 2019-08-06 NOTE — TELEPHONE ENCOUNTER
PCP: Addis Bhagat MD    Last appt: 7/5/2019  No future appointments.     Requested Prescriptions     Pending Prescriptions Disp Refills    metoprolol tartrate (LOPRESSOR) 50 mg tablet [Pharmacy Med Name: METOPROLOL TARTRATE 50MG TABLETS] 180 Tab 0     Sig: TAKE 1 TABLET BY MOUTH TWICE DAILY       Prior labs and Blood pressures:  BP Readings from Last 3 Encounters:   07/05/19 130/68   06/03/19 136/74   05/09/19 122/70     Lab Results   Component Value Date/Time    Sodium 145 06/03/2019 02:09 PM    Potassium 5.2 (H) 06/03/2019 02:09 PM    Chloride 105 06/03/2019 02:09 PM    CO2 29.0 06/03/2019 02:09 PM    Anion gap 11 06/03/2019 02:09 PM    Glucose 91 06/03/2019 02:09 PM    BUN 39.0 (H) 06/03/2019 02:09 PM    Creatinine 1.0 06/03/2019 02:09 PM    BUN/Creatinine ratio 25 04/29/2019 11:12 AM    GFR est AA >60 06/03/2019 02:09 PM    GFR est non-AA 55 06/03/2019 02:09 PM    Calcium 10.6 (H) 06/03/2019 02:09 PM     Lab Results   Component Value Date/Time    Hemoglobin A1c 5.8 (H) 04/29/2019 11:11 AM    Hemoglobin A1c (POC) 5.2 09/28/2018 12:00 PM     Lab Results   Component Value Date/Time    Cholesterol, total 156 04/29/2019 11:12 AM    Cholesterol (POC) 151.0 09/28/2018 12:00 PM    HDL Cholesterol 64 04/29/2019 11:12 AM    HDL Cholesterol (POC) 67.0 09/28/2018 12:00 PM    LDL Cholesterol (POC) 52.6 09/28/2018 12:00 PM    LDL, calculated 59 04/29/2019 11:12 AM    VLDL 33 04/29/2019 11:12 AM    Triglyceride 166 04/29/2019 11:12 AM    Triglycerides (POC) 157.0 09/28/2018 12:00 PM    CHOL/HDL Ratio 2 04/29/2019 11:12 AM     No results found for: Betterick Joneses, XQVID3, XQVID, VD3RIA    No results found for: TSH, TSH2, TSH3, TSHP, TSHEXT

## 2019-08-19 RX ORDER — METOPROLOL TARTRATE 50 MG/1
TABLET ORAL
Qty: 180 TAB | Refills: 0 | Status: SHIPPED | OUTPATIENT
Start: 2019-08-19 | End: 2019-12-10 | Stop reason: SDUPTHER

## 2019-08-20 ENCOUNTER — HOSPITAL ENCOUNTER (OUTPATIENT)
Dept: PHYSICAL THERAPY | Age: 69
Discharge: HOME | End: 2019-08-20
Payer: MEDICARE

## 2019-08-20 PROCEDURE — 97140 MANUAL THERAPY 1/> REGIONS: CPT

## 2019-08-31 VITALS — HEIGHT: 66 IN | BODY MASS INDEX: 47.09 KG/M2 | WEIGHT: 293 LBS

## 2019-09-06 NOTE — PROGRESS NOTES
[facility]  Physical Therapy Lymphedema Clinic  [address], [address], [facility].       LYMPHEDEMA THERAPY  VISIT: 11               []                  Daily note                  [x]                 90 day Reassessment with Updated Plan of Care     NAME: Shelbi Olea  DATE: 8/20/2019   Patient returns today for the first time since 4/30/19. Patient was to return after ordering and purchasing custom flat knit compression, but she did not follow through with recommendations. Patient will need her plan of care extended to continue treatment.      GOALS  Short term goals  Time frame: to be met by 3/15/2019  1. Devante Pin will demonstrate knowledge of signs/symptoms of infections/cellulitis and be independent in skin care to prevent cellulitis. Patient educated on skin care and prevention of cellulitis and infections. Continued education in daily skin care with a low Ph lotion using MLD techniques. Patient is using Eucerin lotion for skin care. Patient has been educated in signs and symptoms of cellulitis. Goal met 3/27/2019. 2.  Patient will demonstrate independence in lymphedema home program of therapeutic exercises to improve circulation and decongest limb to improve ADLs. Patient has been educated in the Active ROM routine with modifications and was educated in the Ronalee Kemps routine with modifications this visit. Patient instructed to perform a complete routine one to two times per day as tolerated. Goal met 3/27/2019.     Short term goal extended to 5/5/19:  3.  Patient will tolerate multi-layer bandages (MLB) and show measureable decrease in limb volume or participate in the selection process to allow ordering of home compression system (daytime, nighttime garments and pump as needed). Patient has received the B LE Solaris Ready Wrap lower leg garments (XL) in black from the vendor, [employer].  She reports that she did not open the extender strap because she did not need it. Patient received the vaso-pneumatic pump with sequential pressures and truncal component in the mail at last visit and since then has had her in home training. She reports that she has not used her pump since training. Encouraged her to use the pump daily conservatively once she has completed care for her pneumonia. Patient today wanted to proceed with be measured for compression knee highs. Due to the severity of her swelling and shape of her lower legs she was shown options and measured for custom flat knit Elvarex Knee highs today. Patient wanted to explore options for vendors, so garments were not ordered today and patient is going to pursue ordering them on her on and contact clinic if we need to submit order to a vendor of her choice. All items for compression have been received or she is in the process of obtaining. Goal Met 4/30/19     Long term goals  Time frame: to be met by 5/5/2019   2.  Patient will be independent with don/doff of compression system and use in order to prevent reaccumulation of fluid at discharge. Patient is able to independently don and doff the Solaris Ready Wrap velcro products in the clinic. Goal met 3/27/2019.     3.  Pt will be independent in self-MLD and show stable limb volumes showing decongestion and pt. ready for transition to independent restorative phase of lymphedema therapy. Continued education in the B LE self MLD packet. Full volumetric measurements taken today reveal a loss of 2580 mL in the uninvolved extremity and a loss of 1698 ml in the involved extremity since her last visit on 3/27/2019. Since evaluation on 2/8/19 patient has lost a total of 4899 ml on the L LE and 4997 ml on the R LE. Volumes today at the lowest they have been since seeking treatment.  Goal Met 4/30/19     Long term goals extended  Time frame: to be met by 10/28/19  1.  Pt will show improvement in the [address] Lymphedema Assessment Scale by decreasing the score to 5/20 and thus allow improvement in patient's quality of life. Patient's score remains at 8/20. Will need to extend goal to work towards improving score. New goal 4. Patient will obtain custom flat knit knee highs and will be able show independence with don/doff prior to discharge. Measured for custom flat knit knee highs last visit in the clinic 4/30/19. Patient wanted to find a vendor to purchase on her own so a list of several local and national vendors issued to patient. Patient did not follow through with recommendations and went to local pharmacy and measured for ready made knee highs that arrived and did not fit. Patient now understanding the reason she was recommended custom garments and suggested that she have them ordered here in the clinic from a vendor that we work with closely to ensure a good fit. Patient now interested in getting compression for upper thighs, so options were discussed today and patient will return next visit to be measured for items once she works more on reduction by using her Flexitouch daily as recommended. (Will modify this goal to be that patient will be measured for appropriate compression for her B LEs that can ordered and fitted prior to her discharge to the restorative phase of care. )                  SUBJECTIVE REPORT: Patient returns today for the first time since 4/30/19. Patient was to order flat knit garments for B LEs and return to have them fitted. Patient did not follow through with recommendation or contact clinic since this time. Patient did report that she went to local pharmacy and was measured for ready made knee highs that the fitter was unable to don. Explained to patient as on previous visit that she would need custom flat knit knee highs due to her leg shape and her significant swelling. Patient arrived today reporting that she wants to look into compression for her upper legs.  Discussed today that she needs to be working diligently on her home program an use her pump to reduce her volumes further before this is done as today she presented with increased B LE volumes. Patient agreed to work on her home program and return next month for follow up to be assessed and measured for garments to prepare for transition to the restorative phase of care. Patient reports that she is no longer having issues with her respiratory issues as previous visit she was on antibiotics for pneumonia. Pain: R knee 4/10 (difficulty getting comfortable in supine even with pillows under her knees. Gait:    [x]  Independent gait with cane for community distances. Sitting rest breaks as needed. Patient would benefit from walking with a rolling walker to improve her gait and take pressure off of her knees. Patient has a walker at home, but does not bring it to her appointments. ADLs: Modified Independent and able to don and doff compression products without assistance. Treatment Response:    []   Patient reviewed packet received at evaluation  []   Patient completed home program as prescribed  [x]   Patient not fully compliant with home program to date (wearing velcro wraps, but has not followed through with ordering her custom knee highs that were recommended and that she requested, not exercising regularly, not performing skin care as recommended and also not using her Flexitouch Plus vaso-pneumatic device)  [x]   Patient now wanting to explore other compression options. Compression options were shown and demonstrated for patient today. Function:   [x]   Patient now independent with donning her compression velcro system.    []   ADLs are requiring less assistance   []   Patient able to return to work/leisure activities  [facility] Lymphedema Assessment Scale: 8/20  Weight:308.2 lbs up from last visit weight of 303.2 lbs      TREATMENT AND OBJECTIVE DATA SUMMARY:   Patient/Family Education:        Educated in skin care: [x]   Skin care products  [x]   Hygiene/removal of garments for bathing and skin assessment. [x]  Prevention of cellulitis  []   Wound care      Educated in exercise: [x]   Walking program  [x]   Mel ball routine  []   Stick routine  [x]   ROM routine      Instructed in self MLD: Continued education in the B LE MLD packet and  abdominal breathing techniques.      Instructed in don/doff of compression system:    [x]   Multi layer bandage (MLB) donning principles and wear precautions- Patient and  have been educated in how to don adequate bandage as needed. [x]   Day garments -Solaris Ready Wraps B LEs  Measured for custom flat knit garments last visit, but patient did not follow through with ordering the garments. []   Night garments      Therapeutic Activity 0 minutes   Treatment time: N/A  Functional Mobility: Modified Independent to supervision. Struggles with B knee pain R > L. Fall risk: Moderate (orthopedic issues causing knee pain and altered gait. )      Therapeutic Exercise/Procedure 0 minutes   Treatment time: 0  Mel ball exercise program: Deferred today   Free exercises/ROM: Deferred today. Patient encouraged to perform modified exercises in the home. Home program: Patient to perform daily to BID:  [x]   Skin care  [x]   Deep abdominal breathing  [x]   Exercise routine  [x]   Walking program  [x]   Rest in supine   [x]   Compression bandage   [x]   Compression garments (Solaris Ready Wraps):  Patient willing to be measured for new compression on her next visit to include foot pieces and upper leg garments.    [x]   Vasopneumatic device-encouraged daily use as tolerated.   []   Wound care  [x]   Self MLD  [x]   Bring supplies to each therapy visit  []   Purchase necessary supplies:   [x]   Weight loss program   []   Follow up with   Rationale: Exercise will increase the lymph angiomotoricity and tissue pressure of the skin and thus decrease swelling.      Modalities 0 minutes   Treatment time: N/A  Vasopneumatic pump: Patient received the Flexitouch Plus vaso-pneumatic pump and has had in home training. Patient not using her pump and encouraged to use daily as tolerated. May need to have re-training set up on her next visit if she has not been using the device as she may need more education.          Manual Lymphatic Drainage (MLD)     100    minutes   Treatment time: 10:55am-12:35pm        Area to decongest:    [] UE          []  Right     []  Left      [] Trunk     [x] LE             [x]  Right     [x]  Left      [x] Trunk      Sequence used and effectiveness: [] Secondary/Primary sequence for upper extremity with / without trunk involvement         [] Secondary sequence for lower extremities with  trunk involvement.      [x] Modifications were made to manual lymph drainage sequence to exclude cervical techniques secondary to medical history/age     [x] Self MLD sequence/techniques/hand strokes  Continued education this visit to improve techniques.        Skin/wound care/debridement: Patient's skin is intact bilaterally. She continues with hyperpigmentation below knees and with dry skin. She increased swelling at the knee and above with texture changes from increased swelling. Patient does have an ointment that was prescribed for her lower legs by MD, but she has not brought it in to the clinic. Recommended to bring in this ointment in for her visits, but to date has not.     Reminded patient that daily to BID skin care is an important part of her home program.     Upper/Lower extremity compression: Patient arrived wearing the B LE Solaris Ready Wrap velcro systems size XL/tall. Patient did not follow through with ordering her custom flat knit knee highs as planned after her last visit on 4/30/19 and returns today to be assessed and discuss compression options. Patient reported that she went to local pharmacy to be measured for ready made knee highs and they did not fit.  Explained to patient again due to her circumferences and condition of her legs that she needs custom knee highs in a flat knit material for best fit. Patient now interested in ordering compression for upper legs and possibly getting foot pieces as well. She was shown all options for compression again today:custom flat knit knee highs (demonstrated with a demo garment that she could don/doff), velcro products for entire length of leg, night time foam garments as well. Patient shown again compression kyle pants to wear with the velcro systems that have been recommended for some time, but she has not followed through with purchasing them. Because patient has not been consistent with her home program and following up with ordering compression garments that have been recommended her volumes are up and today and is not appropriate to be measured for garments at this time. Patient encouraged to utilize her current products and use her Flexitouch Plus vaso-pneumatic device daily and return in a few weeks to be assessed and measured for new compression products.       Patient has all needed MLB supplies to use in the home as needed.      Kinesiotaping: Deferred    Girth/Volume measurement: Full volumes taken today to reveal that patient has increased volumes on the R  ml and 1047 ml on the L LE since last visit on 4/30/19. Since initial evaluation on 2/08/19 patient has lost 4,563 ml on the R LE and 3,852 ml on the L LE (500 ml=1 pound) Patient has lost approximately 17 lbs of fluid in B LEs since February. R LE 17,936.63 ml today compared to 22,499.84 ml on evaluation 2/8/19  L LE 18,448. 58 ml today compared to 22,300.95 ml on evaluation 2/8/19       TOTAL TREATMENT 100 mins      Circumferential Limb Measurements:  Full volumetric measurements taken today.  See measurements above.             ASSESSMENT:   Treatment effectiveness and tolerance: Patient unfortunately did not follow up with recommendations after last visit and returns today for assessment and requesting to be measured for more compression garments. Patient now presenting with increased volumes and is not appropriate to be measured for products until she works on her home program consistently. Patient agreed to return in approximately a month for follow up to be measured for products once she has reduced further. Overall since her evaluation in February her B LEs are much improved, but for patient to be successful long term she is going to have to be consistent with all aspects of her CDT home program and follow recommendations given in the clinic. Will ask for extension of her plan of care so that we can finalize these last compression garments so that she has everything she needs to transition into the restorative phase of care. Progress toward goals: Patient met all STGs and LTG 2+3. Will continue LTG 1 and LTG 4. See goal section of note for details.           PLAN OF CARE:   Changes to the plan of care: Plan of care to be updated as patient wants to be followed and treated for a few more visits in order to get all needed compression ordered and fitted for the restorative phase of care. Patient unfortunately did not order her garments after last visit, which delayed her progress. Frequency: []  2 times a week   []  Weekly  [x]  Biweekly  [x]  Monthly          Kb Jessica, PTA, CLT   Eugenie Kamara PT, CLT          TREATMENT PLAN EFFECTIVE DATES:   8/2/2019 TO 10/28/19  I have read the above plan of care for Campbell Medina. I certify the above prescribed services are required by this patient and are medically necessary.   The above plan of care has been developed in conjunction with the lymphedema/physical therapist.   Physician Signature: ____________________________________________________      Dr. Сергей Matias  Date: ______

## 2019-09-09 RX ORDER — SIMVASTATIN 20 MG/1
TABLET, FILM COATED ORAL
Qty: 90 TAB | Refills: 0 | Status: SHIPPED | OUTPATIENT
Start: 2019-09-09 | End: 2019-12-10 | Stop reason: SDUPTHER

## 2019-09-09 NOTE — TELEPHONE ENCOUNTER
RX refill request from the patient/pharmacy. Patient last seen 06- with labs, and does not have a f/up appointment.   Requested Prescriptions     Pending Prescriptions Disp Refills    simvastatin (ZOCOR) 20 mg tablet 90 Tab 0     Sig: TAKE 1 TABLET BY MOUTH EVERY EVENING FOR CHOLESTEROL

## 2019-09-19 ENCOUNTER — HOSPITAL ENCOUNTER (OUTPATIENT)
Dept: PHYSICAL THERAPY | Age: 69
Discharge: HOME | End: 2019-09-19
Payer: MEDICARE

## 2019-09-19 PROCEDURE — 97140 MANUAL THERAPY 1/> REGIONS: CPT

## 2019-09-19 NOTE — PROGRESS NOTES
[facility]  Physical Therapy Lymphedema Clinic  [address], [address], [place]  [address].       LYMPHEDEMA THERAPY  VISIT: 12               []                  Daily note                  [x]                 30 day Reassessment/Progress Note     NAME: August[place] Sep  DATE: 9/19/2019      GOALS  Short term goals  Time frame: to be met by 3/15/2019  1. Ale Chacon will demonstrate knowledge of signs/symptoms of infections/cellulitis and be independent in skin care to prevent cellulitis. Patient educated on skin care and prevention of cellulitis and infections. Continued education in daily skin care with a low Ph lotion using MLD techniques. Patient is using Eucerin lotion for skin care. Patient has been educated in signs and symptoms of cellulitis. Goal met 3/27/2019. 2.  Patient will demonstrate independence in lymphedema home program of therapeutic exercises to improve circulation and decongest limb to improve ADLs. Patient has been educated in the Active ROM routine with modifications and was educated in the [employer] routine with modifications this visit. Patient instructed to perform a complete routine one to two times per day as tolerated. Goal met 3/27/2019.     Short term goal extended to 5/5/19:  3.  Patient will tolerate multi-layer bandages (MLB) and show measureable decrease in limb volume or participate in the selection process to allow ordering of home compression system (daytime, nighttime garments and pump as needed). Patient has received the B LE Solaris Ready Wrap lower leg garments (XL) in black from the vendor, [employer]. She reports that she did not open the extender strap because she did not need it. Patient received the vaso-pneumatic pump with sequential pressures and truncal component in the mail last visit and since then has had her home training. She reports that she has not used her pump since training.  Encouraged her to use the pump daily conservatively once she has completed care for her pneumonia. Patient today wanted to proceed with being measured for compression knee highs. Due to the severity of her swelling and shape of her lower legs she was shown options and measured for custom flat knit [employer] Knee highs today. Patient wanted to explore options for vendors, so garments were not ordered today and patient is going to pursue ordering them on her own and contact clinic if we need to submit order to a vendor of her choice. All items for compression have been received or she is in the process of obtaining. Goal Met 4/30/19     Long term goals  Time frame: to be met by 5/5/2019   2.  Patient will be independent with don/doff of compression system and use in order to prevent reaccumulation of fluid at discharge. Patient is able to independently don and doff the Solaris Ready Wrap velcro products in the clinic. Goal met 3/27/2019.     3.  Pt will be independent in self-MLD and show stable limb volumes showing decongestion and pt. ready for transition to independent restorative phase of lymphedema therapy. Continued education in the B LE self MLD packet. Full volumetric measurements taken today reveal a loss of 2580 mL in the uninvolved extremity and a loss of 1698 ml in the involved extremity since her last visit on 3/27/2019. Since evaluation on 2/8/19 patient has lost a total of 4899 ml on the L LE and 4997 ml on the R LE. Volumes today at the lowest they have been since seeking treatment. Goal Met 4/30/19     Long term goals extended  Time frame: to be met by 10/28/19  1.  Pt will show improvement in the [address] Lymphedema Assessment Scale by decreasing the score to 5/20 and thus allow improvement in patient's quality of life. Patient's score remains at 8/20. Will need to extend goal to work towards improving score. New goal 4.  Patient will obtain appropriate compression for her B LEs  (ordered and fitted) prior to her discharge to the restorative phase of care. independence with don/doff prior to discharge. Measured for custom flat knit knee highs on  4/30/19 and patient never completed the order. Patient wanted to find a vendor to purchase on her own so a list of several local and national vendors issued to patient. Patient did not follow through with recommendations and went to local pharmacy and was measured for ready made knee highs that arrived and did not fit. Patient now understanding the reason she was recommended custom garments and suggested that she have them ordered here in the clinic from a vendor that we work with closely to ensure a good fit. Patient now interested in getting velcro compression garments for her upper leg. Options were discussed with patient and today she was measured for Solaris Ready Wrap Thigh and knee pieces both in XX Large and also recommended Compression sock liners. Order was placed with [employer] and will complete order on her own. Patient to bring in her items to assess fit on next visit.                SUBJECTIVE REPORT: Patient returned today after working on her home program and using her vaso-pneumatic pump so that she could be measured for velcro compression garments for her upper leg. Patient wanted to order these through [employer] so information sent to them and patient given information on how to complete the order. Patient will receive the products and return for follow up to see how she is managing them. Pain:0/10  Gait:    [x]  Independent gait with cane for community distances. Sitting rest breaks as needed. Patient would benefit from walking with a rolling walker to improve her gait and take pressure off of her knees. Patient has a walker at home, but does not bring it to her appointments. ADLs: Modified Independent and able to don and doff compression products without assistance.    Treatment Response:    []   Patient reviewed packet received at evaluation  []   Patient completed home program as prescribed  [x]   Patient partially compliant with home program to date   [x]   Patient now wanting to explore other compression options. Compression options were shown and demonstrated for patient and today she was able to be measured for and ordered Solaris Ready Wrap garments for Upper leg. These items can be used on either leg, but encouraged her to begin on the L LE. Patient will return to have items assessed. Function:   [x]   Patient now independent with donning her compression velcro system. []   ADLs are requiring less assistance   []   Patient able to return to work/leisure activities  [facility] Lymphedema Assessment Scale: 8/20  Weight:Deferred       TREATMENT AND OBJECTIVE DATA SUMMARY:   Patient/Family Education:        Educated in skin care: [x]   Skin care products  [x]   Hygiene/removal of garments for bathing and skin assessment. [x]  Prevention of cellulitis  []   Wound care      Educated in exercise: [x]   Walking program  [x]   Mel ball routine  []   Stick routine  [x]   ROM routine      Instructed in self MLD: Continued education in the B LE MLD packet and abdominal breathing techniques.      Instructed in don/doff of compression system:    [x]   Multi layer bandage (MLB) donning principles and wear precautions- Patient and  have been educated in how to don adequate bandage as needed. [x]   Day garments -Solaris Ready Wraps B LEs  Measured today for Solaris Ready Wraps for upper leg. Will start with one leg and progress to two as able. []   Night garments      Therapeutic Activity 0 minutes   Treatment time: N/A  Functional Mobility: Modified Independent to supervision. Struggles with B knee pain R > L. Fall risk:  Moderate (orthopedic issues causing knee pain and altered gait. )      Therapeutic Exercise/Procedure 0 minutes   Treatment time: 0  Mel ball exercise program: Deferred today Free exercises/ROM: Deferred today. Patient encouraged to perform modified exercises in the home. Home program: Patient to perform daily to BID:  [x]   Skin care  [x]   Deep abdominal breathing  [x]   Exercise routine  [x]   Walking program  [x]   Rest in supine   [x]   Compression bandage   [x]   Compression garments (Solaris Ready Wraps):  [x]   Vasopneumatic device-encouraged daily use as tolerated.  []   Wound care  [x]   Self MLD  [x]   Bring supplies to each therapy visit  []   Purchase necessary supplies:   [x]   Weight loss program   []   Follow up with   Rationale: Exercise will increase the lymph angiomotoricity and tissue pressure of the skin and thus decrease swelling.      Modalities 0 minutes   Treatment time: N/A  Vasopneumatic pump: Patient received the Flexitouch Plus vaso-pneumatic pump and has had in home training. Encouraged to use daily as tolerated. Discussed re-training, but she is agreeable to have some one come to her home. Will get in touch with [employer] to arrange other options.         Manual Lymphatic Drainage (MLD) 60   minutes   Treatment time: 12:30pm-1:30pm        Area to decongest:    [] UE          []  Right     []  Left      [] Trunk     [x] LE             [x]  Right     [x]  Left      [x] Trunk      Sequence used and effectiveness: [] Secondary/Primary sequence for upper extremity with / without trunk involvement      [] Secondary sequence for lower extremities with  trunk involvement.      [x] Modifications were made to manual lymph drainage sequence to exclude cervical techniques secondary to medical history/age     [x] Self MLD sequence/techniques/hand strokes  Continued education this visit to improve techniques. Skin/wound care/debridement: Patient's skin is intact bilaterally. She continues with hyperpigmentation below knees and with dry skin. She increased swelling at the knee and above with texture changes from increased swelling.      Patient does have an ointment that was prescribed for her lower legs by MD, but she has not brought it in to the clinic. Recommended to bring in this ointment in for her visits, but to date has not. Reminded patient that daily to BID skin care is an important part of her home program.     Upper/Lower extremity compression: Patient arrived wearing the B LE Solaris Ready Wrap velcro systems size XL/tall for lower legs. Patient did not follow through with ordering her custom flat knit knee highs as planned after her last on 4/30/19 and returned last visit on 8/20/19 wanting to revisit her options in compression. Patient now interested in ordering compression for upper legs and possibly getting foot pieces as well. Last visit her volumes were up so today she returns and able to decide on velcro garments for her upper leg for L LE. Patient had many questions regarding styles and types of garments that were answered today. Patient measured for and will purchase Solaris Ready Wrap Thigh and Knee pieces. She will look into foot pieces on upcoming visit. Order was sent to [facility] for processing per patient request and patient will contact them for payment. Discussed wear and care of velcro for upper leg and return policy. Patient to begin wearing once she receives the products and return to the clinic for follow up.      Patient has all needed MLB supplies to use in the home as needed. Kinesiotaping: Deferred    Girth/Volume measurement: Measured today for Upper leg velcro garments. Will reassess volumes on an upcoming visit.       TOTAL TREATMENT 60 mins           ASSESSMENT:   Treatment effectiveness and tolerance: Patient able to work on her home program and returns today for measurement of velcro products for the  L LE. Patient will need to begin wearing them on arrival and return for follow up to assess fit and to begin to finalize her home program for discharge to the restorative phase of care.    Once patient has all needed compression and is aware of how to order additional garments as needed she should be ready to transition to the restorative phase of care. Patient needs retraining in regards to use of her Flexitouch  Vaso-pneumatic device so will look into options for this as patient does not want anyone to come to her home. Progress toward goals: Patient met all STGs and LTG 2+3. Will continue LTG 1 and LTG 4. See goal section of note for details.           PLAN OF CARE:   Changes to the plan of care: Plan of care was updated last visit as patient wants to be followed and treated for a few more visits in order to get all needed compression ordered and fitted for the restorative phase of care. Patient unfortunately did not order her garments in April as requested which delayed her progress.     Frequency: []  2 times a week   []  Weekly  [x]  Biweekly  [x]  Monthly        Kb Jessica, PTA, CLT   Howard Cox, PT, CLT

## 2019-10-18 ENCOUNTER — HOSPITAL ENCOUNTER (OUTPATIENT)
Dept: PHYSICAL THERAPY | Age: 69
Discharge: HOME | End: 2019-10-18
Payer: MEDICARE

## 2019-10-18 PROCEDURE — 97140 MANUAL THERAPY 1/> REGIONS: CPT

## 2019-11-15 PROBLEM — E74.39 GLUCOSE INTOLERANCE: Status: ACTIVE | Noted: 2019-11-15

## 2019-11-15 PROBLEM — E78.2 MIXED HYPERLIPIDEMIA: Status: ACTIVE | Noted: 2017-08-08

## 2019-11-15 PROBLEM — M15.9 PRIMARY OSTEOARTHRITIS INVOLVING MULTIPLE JOINTS: Status: ACTIVE | Noted: 2019-11-15

## 2019-11-18 ENCOUNTER — OFFICE VISIT (OUTPATIENT)
Dept: INTERNAL MEDICINE CLINIC | Age: 69
End: 2019-11-18

## 2019-11-18 VITALS
OXYGEN SATURATION: 98 % | WEIGHT: 293 LBS | SYSTOLIC BLOOD PRESSURE: 160 MMHG | BODY MASS INDEX: 47.09 KG/M2 | TEMPERATURE: 98.5 F | DIASTOLIC BLOOD PRESSURE: 80 MMHG | HEIGHT: 66 IN | HEART RATE: 66 BPM | RESPIRATION RATE: 18 BRPM

## 2019-11-18 DIAGNOSIS — M15.9 PRIMARY OSTEOARTHRITIS INVOLVING MULTIPLE JOINTS: ICD-10-CM

## 2019-11-18 DIAGNOSIS — N18.2 CKD (CHRONIC KIDNEY DISEASE) STAGE 2, GFR 60-89 ML/MIN: ICD-10-CM

## 2019-11-18 DIAGNOSIS — I89.0 LYMPHEDEMA OF BOTH LOWER EXTREMITIES: ICD-10-CM

## 2019-11-18 DIAGNOSIS — E78.2 MIXED HYPERLIPIDEMIA: ICD-10-CM

## 2019-11-18 DIAGNOSIS — I48.0 PAF (PAROXYSMAL ATRIAL FIBRILLATION) (HCC): ICD-10-CM

## 2019-11-18 DIAGNOSIS — Z00.00 MEDICARE ANNUAL WELLNESS VISIT, SUBSEQUENT: ICD-10-CM

## 2019-11-18 DIAGNOSIS — N39.0 URINARY TRACT INFECTION WITHOUT HEMATURIA, SITE UNSPECIFIED: ICD-10-CM

## 2019-11-18 DIAGNOSIS — K21.9 GASTROESOPHAGEAL REFLUX DISEASE WITHOUT ESOPHAGITIS: ICD-10-CM

## 2019-11-18 DIAGNOSIS — E66.01 MORBID OBESITY WITH BMI OF 50.0-59.9, ADULT (HCC): ICD-10-CM

## 2019-11-18 DIAGNOSIS — I87.2 STASIS DERMATITIS OF BOTH LEGS: ICD-10-CM

## 2019-11-18 DIAGNOSIS — Z23 ENCOUNTER FOR IMMUNIZATION: ICD-10-CM

## 2019-11-18 DIAGNOSIS — E55.9 VITAMIN D DEFICIENCY: ICD-10-CM

## 2019-11-18 DIAGNOSIS — E74.39 GLUCOSE INTOLERANCE: ICD-10-CM

## 2019-11-18 DIAGNOSIS — I25.10 ASCVD (ARTERIOSCLEROTIC CARDIOVASCULAR DISEASE): ICD-10-CM

## 2019-11-18 DIAGNOSIS — I10 ESSENTIAL HYPERTENSION: Primary | ICD-10-CM

## 2019-11-18 LAB
CHOL/HDL RATIO,CHHD: 2 RATIO (ref 0–4)
CHOLEST SERPL-MCNC: 186 MG/DL (ref 0–200)
CK SERPL-CCNC: 53 U/L (ref 30–135)
HBA1C MFR BLD HPLC: 5.4 % (ref 4–5.7)
HDLC SERPL-MCNC: 81 MG/DL (ref 35–130)
LDL/HDL RATIO,LDHD: 1 RATIO
LDLC SERPL CALC-MCNC: 70 MG/DL (ref 0–130)
TRIGL SERPL-MCNC: 174 MG/DL (ref 0–200)
VLDLC SERPL CALC-MCNC: 35 MG/DL

## 2019-11-18 RX ORDER — LISINOPRIL 20 MG/1
40 TABLET ORAL DAILY
Qty: 90 TAB | Refills: 1 | Status: SHIPPED | OUTPATIENT
Start: 2019-11-18 | End: 2019-12-10 | Stop reason: SDUPTHER
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
## 2019-11-18 NOTE — PROGRESS NOTES
After obtaining written consent and per orders of Dr. Cherelle Singletary, injection of flu vaccine given by Shukri Oneal RN. Order and injection/medication verified by second nurse/ma review by Fatmata Pascual LPN. Patient tolerated procedure well. VIS was given to them. No reactions noted.

## 2019-11-18 NOTE — PATIENT INSTRUCTIONS

## 2019-11-18 NOTE — PROGRESS NOTES
Nick Bermeo  Identified pt with two pt identifiers(name and ). Chief Complaint   Patient presents with   Karina Lora Annual Wellness Visit       1. Have you been to the ER, urgent care clinic since your last visit? Hospitalized since your last visit? no    2. Have you seen or consulted any other health care providers outside of the [address] [place] [address] since your last visit? Include any pap smears or colon screening. no      Health Maintenance Topics with due status: Overdue       Topic Date Due    Shingrix Vaccine Age 50> 1999    GLAUCOMA SCREENING Q2Y 2014    Influenza Age 5 to Adult 2019    COLONOSCOPY 2019     Health Maintenance Topics with due status: Due Soon       Topic Date Due    BREAST CANCER SCRN MAMMOGRAM 2020     Health Maintenance Topics with due status: Not Due       Topic Last Completion Date    DTaP/Tdap/Td series 2014    MEDICARE YEARLY EXAM 2019     Health Maintenance Topics with due status: Completed       Topic Last Completion Date    Bone Densitometry (Dexa) Screening 2014    Pneumococcal 65+ years 10/29/2015    Hepatitis C Screening 2017           Medication reconciliation up to date and corrected with patient at this time. Today's provider has been notified of reason for visit, vitals and flowsheets obtained on patients. Reviewed record in preparation for visit, huddled with provider and have obtained necessary documentation.         Wt Readings from Last 3 Encounters:   19 309 lb 11.2 oz (140.5 kg)   19 308 lb 3.2 oz (139.8 kg)   19 300 lb 3.2 oz (136.2 kg)     Temp Readings from Last 3 Encounters:   19 98.5 °F (36.9 °C)   19 97.9 °F (36.6 °C) (Oral)   19 98.3 °F (36.8 °C) (Oral)     BP Readings from Last 3 Encounters:   19 160/80   19 130/68   19 136/74     Pulse Readings from Last 3 Encounters:   19 66   19 69   19 75     Vitals:    19 1344   BP: 160/80   Pulse: 66   Resp: 18   Temp: 98.5 °F (36.9 °C)   SpO2: 98%   Weight: 309 lb 11.2 oz (140.5 kg)   Height: 5' 6\" (1.676 m)   PainSc:   9   PainLoc: Knee         Learning Assessment:  :     No flowsheet data found. Depression Screening:  :     3 most recent PHQ Screens 11/18/2019   Little interest or pleasure in doing things Not at all   Feeling down, depressed, irritable, or hopeless Several days   Total Score PHQ 2 1   Trouble falling or staying asleep, or sleeping too much -   Feeling tired or having little energy -   Poor appetite, weight loss, or overeating -   Feeling bad about yourself - or that you are a failure or have let yourself or your family down -   Trouble concentrating on things such as school, work, reading, or watching TV -   Moving or speaking so slowly that other people could have noticed; or the opposite being so fidgety that others notice -   Thoughts of being better off dead, or hurting yourself in some way -   PHQ 9 Score -       No flowsheet data found. Fall Risk Assessment:  :     Fall Risk Assessment, last 12 mths 11/18/2019   Able to walk? Yes   Fall in past 12 months? No       Abuse Screening:  :     Abuse Screening Questionnaire 11/18/2019 10/2/2018   Do you ever feel afraid of your partner? N N   Are you in a relationship with someone who physically or mentally threatens you? N N   Is it safe for you to go home?  Y Y       ADL Screening:  :     ADL Assessment 11/18/2019   Feeding yourself No Help Needed   Getting from bed to chair No Help Needed   Getting dressed No Help Needed   Bathing or showering No Help Needed   Walk across the room (includes cane/walker) Help Needed   Using the telphone No Help Needed   Taking your medications No Help Needed   Preparing meals No Help Needed   Managing money (expenses/bills) No Help Needed   Moderately strenuous housework (laundry) No Help Needed   Shopping for personal items (toiletries/medicines) No Help Needed   Shopping for groceries No Help Needed   Driving No Help Needed   Climbing a flight of stairs No Help Needed   Getting to places beyond walking distances No Help Needed

## 2019-11-19 VITALS — BODY MASS INDEX: 47.09 KG/M2 | HEIGHT: 66 IN | WEIGHT: 293 LBS

## 2019-11-19 LAB
25(OH)D3 SERPL-MCNC: 59 NG/ML (ref 30–96)
A-G RATIO,AGRAT: 1.2 RATIO
ALBUMIN SERPL-MCNC: 4.1 G/DL (ref 3.9–5.4)
ALP SERPL-CCNC: 113 U/L (ref 38–126)
ALT SERPL-CCNC: 18 U/L (ref 0–35)
ANION GAP SERPL CALC-SCNC: 8 MMOL/L
AST SERPL W P-5'-P-CCNC: 22 U/L (ref 14–36)
BASOPHILS # BLD AUTO: 0.1 X10E3/UL (ref 0–0.2)
BASOPHILS NFR BLD AUTO: 1 %
BILIRUB SERPL-MCNC: 0.6 MG/DL (ref 0.2–1.3)
BUN SERPL-MCNC: 30 MG/DL (ref 7–17)
BUN/CREATININE RATIO,BUCR: 27 RATIO
CALCIUM SERPL-MCNC: 10.7 MG/DL (ref 8.4–10.2)
CHLORIDE SERPL-SCNC: 104 MMOL/L (ref 98–107)
CO2 SERPL-SCNC: 32 MMOL/L (ref 22–32)
CREAT SERPL-MCNC: 1.1 MG/DL (ref 0.7–1.2)
EOSINOPHIL # BLD AUTO: 0.1 X10E3/UL (ref 0–0.4)
EOSINOPHIL NFR BLD AUTO: 1 %
ERYTHROCYTE [DISTWIDTH] IN BLOOD BY AUTOMATED COUNT: 13 % (ref 12.3–15.4)
GLOBULIN,GLOB: 3.4
GLUCOSE SERPL-MCNC: 92 MG/DL (ref 65–105)
HCT VFR BLD AUTO: 43.5 % (ref 34–46.6)
HGB BLD-MCNC: 14.2 G/DL (ref 11.1–15.9)
IMM GRANULOCYTES # BLD AUTO: 0 X10E3/UL (ref 0–0.1)
IMM GRANULOCYTES NFR BLD AUTO: 1 %
LYMPHOCYTES # BLD AUTO: 1.6 X10E3/UL (ref 0.7–3.1)
LYMPHOCYTES NFR BLD AUTO: 25 %
MCH RBC QN AUTO: 29.8 PG (ref 26.6–33)
MCHC RBC AUTO-ENTMCNC: 32.6 G/DL (ref 31.5–35.7)
MCV RBC AUTO: 91 FL (ref 79–97)
MONOCYTES # BLD AUTO: 0.8 X10E3/UL (ref 0.1–0.9)
MONOCYTES NFR BLD AUTO: 12 %
NEUTROPHILS # BLD AUTO: 4 X10E3/UL (ref 1.4–7)
NEUTROPHILS NFR BLD AUTO: 60 %
PLATELET # BLD AUTO: 269 X10E3/UL (ref 150–450)
POTASSIUM SERPL-SCNC: 5.7 MMOL/L (ref 3.6–5)
PROT SERPL-MCNC: 7.5 G/DL (ref 6.3–8.2)
RBC # BLD AUTO: 4.77 X10E6/UL (ref 3.77–5.28)
SODIUM SERPL-SCNC: 144 MMOL/L (ref 137–145)
T4 FREE SERPL-MCNC: 0.99 NG/DL (ref 0.58–2.3)
TSH SERPL DL<=0.05 MIU/L-ACNC: 2.08 UIU/ML (ref 0.34–5.6)
WBC # BLD AUTO: 6.6 X10E3/UL (ref 3.4–10.8)

## 2019-11-20 ENCOUNTER — HOSPITAL ENCOUNTER (OUTPATIENT)
Dept: PHYSICAL THERAPY | Age: 69
Discharge: HOME | End: 2019-11-20
Payer: MEDICARE

## 2019-11-20 LAB
BACTERIA,BACTU: ABNORMAL
BILIRUB UR QL: NEGATIVE
CALCIUM OXALATE CRYSTALS: ABNORMAL
CLARITY: CLEAR
COLOR UR: ABNORMAL
GLUCOSE 24H UR-MRATE: NEGATIVE G/(24.H)
HGB UR QL STRIP: NEGATIVE
KETONES UR QL STRIP.AUTO: NEGATIVE
LEUKOCYTE ESTERASE: NEGATIVE
NITRITE UR QL STRIP.AUTO: NEGATIVE
PH UR STRIP: 6 [PH] (ref 5–7)
PROT UR STRIP-MCNC: ABNORMAL MG/DL
RBC #/AREA URNS HPF: 0 #/HPF
SP GR UR REFRACTOMETRY: 1.03 (ref 1–1.03)
SQUAMOUS EPITHELIAL CELLS: ABNORMAL
UROBILINOGEN UR QL STRIP.AUTO: NEGATIVE
WBC URNS QL MICRO: 0 #/HPF

## 2019-11-20 PROCEDURE — 97140 MANUAL THERAPY 1/> REGIONS: CPT

## 2019-11-22 LAB — BACTERIA UR CULT: NORMAL

## 2019-11-25 RX ORDER — BUPROPION HYDROCHLORIDE 150 MG/1
TABLET, EXTENDED RELEASE ORAL
Qty: 180 TAB | Refills: 0 | Status: SHIPPED | OUTPATIENT
Start: 2019-11-25 | End: 2019-12-10 | Stop reason: SDUPTHER

## 2019-11-26 RX ORDER — BUPROPION HYDROCHLORIDE 150 MG/1
TABLET, EXTENDED RELEASE ORAL
Qty: 180 TAB | Refills: 0 | Status: SHIPPED | OUTPATIENT
Start: 2019-11-26 | End: 2019-12-10 | Stop reason: DRUGHIGH

## 2019-12-05 NOTE — PROGRESS NOTES
[facility]  Physical Therapy Lymphedema Clinic  [address] [facility], [address], [facility] 22.       LYMPHEDEMA THERAPY  VISIT: 13               []                  Daily note                  [x]                 30 day Reassessment/Progress Note     NAME: George Rushing  DATE: 10/18/2019      GOALS  Short term goals  Time frame: to be met by 3/15/2019  1. Agustin Sr will demonstrate knowledge of signs/symptoms of infections/cellulitis and be independent in skin care to prevent cellulitis. Patient educated on skin care and prevention of cellulitis and infections. Continued education in daily skin care with a low Ph lotion using MLD techniques. Patient is using Eucerin lotion for skin care. Patient has been educated in signs and symptoms of cellulitis. Goal met 3/27/2019. 2.  Patient will demonstrate independence in lymphedema home program of therapeutic exercises to improve circulation and decongest limb to improve ADLs. Patient has been educated in the Active ROM routine with modifications and was educated in the Hope routine with modifications this visit. Patient instructed to perform a complete routine one to two times per day as tolerated. Goal met 3/27/2019.     Short term goal extended to 5/5/19:  3.  Patient will tolerate multi-layer bandages (MLB) and show measureable decrease in limb volume or participate in the selection process to allow ordering of home compression system (daytime, nighttime garments and pump as needed). Patient has received the B LE Solaris Ready Wrap lower leg garments (XL) in black from the vendor, [employer]. She reports that she did not open the extender strap because she did not need it. Patient received the vaso-pneumatic pump with sequential pressures and truncal component in the mail at last visit and since then has had her in home training. She reports that she has not used her pump since training.  Encouraged her to use the pump daily conservatively once she has completed care for her pneumonia. Patient today wanted to proceed with be measured for compression knee highs. Due to the severity of her swelling and shape of her lower legs she was shown options and measured for custom flat knit Elvarex Knee highs today. Patient wanted to explore options for vendors, so garments were not ordered today and patient is going to pursue ordering them on her on and contact clinic if we need to submit order to a vendor of her choice. All items for compression have been received or she is in the process of obtaining. Goal Met 4/30/19     Long term goals  Time frame: to be met by 5/5/2019   2.  Patient will be independent with don/doff of compression system and use in order to prevent reaccumulation of fluid at discharge. Patient is able to independently don and doff the Solaris Ready Wrap velcro products in the clinic. Goal met 3/27/2019.     3.  Pt will be independent in self-MLD and show stable limb volumes showing decongestion and pt. ready for transition to independent restorative phase of lymphedema therapy. Continued education in the B LE self MLD packet. Full volumetric measurements taken today reveal a loss of 2580 mL in the uninvolved extremity and a loss of 1698 ml in the involved extremity since her last visit on 3/27/2019. Since evaluation on 2/8/19 patient has lost a total of 4899 ml on the L LE and 4997 ml on the R LE. Volumes today at the lowest they have been since seeking treatment. Goal Met 4/30/19     Long term goals extended  Time frame: to be met by 10/28/19  1.  Pt will show improvement in the [address] Lymphedema Assessment Scale by decreasing the score to 5/20 and thus allow improvement in patient's quality of life. Patient's score remains at 8/20. Will need to extend goal to work towards improving score. New goal 4.  Patient will obtain appropriate compression for her B LEs  (ordered and fitted) prior to her discharge to the restorative phase of care. independence with don/doff prior to discharge. Measured for custom flat knit knee highs on  4/30/19 and patient never completed the order. Patient wanted to find a vendor to purchase on her own so a list of several local and national vendors issued to patient. Patient did not follow through with recommendations and went to local pharmacy and measured for ready made knee highs that arrived and did not fit. Patient interested in getting velcro compression garments for her upper leg last visit. Options were discussed with patient and she was measured for Solaris Ready Wrap Thigh and knee pieces both in XX Large and also recommended Compression sock liners. Order was placed with [employer] and patient completed order on her own. Patient wore the items in today for fitting and they fit well. Patient did need some instruction on how to improve donning the knee portion, but otherwise she was doing well. Patient will work with these products and return next visit to determine final products that she may need for discharge.              SUBJECTIVE REPORT: Patient able to to obtain the compression garments that were ordered last visit and wore them into the clinic today for assessment. Patient reports that she is working with the garments daily. Pain:0/10  Gait:    [x]  Independent gait with cane for community distances. Sitting rest breaks as needed. Patient would benefit from walking with a rolling walker to improve her gait and take pressure off of her knees. Patient has a walker at home, but does not bring it to her appointments. ADLs: Modified Independent and able to don and doff compression products without assistance.    Treatment Response:    []   Patient reviewed packet received at evaluation  []   Patient completed home program as prescribed  [x]   Patient partially compliant with home program to date   [x]   Patient able to have her new velcro garments for upper leg assessed today. Patient will work with them until next visit and then determine how effective she is with the products and then discuss what other items that she will need for discharge. Function:   [x]   Patient now independent with donning her compression velcro system. []   ADLs are requiring less assistance   []   Patient able to return to work/leisure activities  [facility] Lymphedema Assessment Scale: Deferred   Weight: Deferred       TREATMENT AND OBJECTIVE DATA SUMMARY:   Patient/Family Education:        Educated in skin care: [x]   Skin care products  [x]   Hygiene/removal of garments for bathing and skin assessment. [x]  Prevention of cellulitis  []   Wound care      Educated in exercise: [x]   Walking program  [x]   Mel ball routine  []   Stick routine  [x]   ROM routine      Instructed in self MLD: Continued education in the B LE MLD packet and abdominal breathing techniques.      Instructed in don/doff of compression system:    [x]   Multi layer bandage (MLB) donning principles and wear precautions-Patient and  have been educated in how to don adequate bandage as needed. [x]   Day garments -Solaris Ready Wraps B LEs lower legs. Solaris Ready Wraps for upper leg. Will start with one leg and progress to two as able. []   Night garments      Therapeutic Activity 0 minutes   Treatment time: N/A  Functional Mobility: Modified Independent to supervision. Struggles with B knee pain R > L. Fall risk: Moderate (orthopedic issues causing knee pain and altered gait. )      Therapeutic Exercise/Procedure 0 minutes   Treatment time: 0  Mel ball exercise program: Deferred today   Free exercises/ROM: Deferred today. Patient encouraged to perform modified exercises in the home.     Home program: Patient to perform daily to BID:  [x]   Skin care  [x]   Deep abdominal breathing  [x]   Exercise routine  [x]   Walking program  [x]   Rest in supine   [x] Compression bandage   [x]   Compression garments (Solaris Ready Wraps):  [x]   Vasopneumatic device-encouraged daily use as tolerated. Patient reports that she was having issues with the fit of her trunk garment so encouraged her to bring in her garments next visit so that we could assess them and have [employer]  assist as well, demonstrated today how to improve fit)  []   Wound care  [x]   Self MLD  [x]   Bring supplies to each therapy visit  []   Purchase necessary supplies:   [x]   Weight loss program   []   Follow up with   Rationale: Exercise will increase the lymph angiomotoricity and tissue pressure of the skin and thus decrease swelling.      Modalities 0 minutes   Treatment time: N/A  Vasopneumatic pump: Patient received the Flexitouch Plus vaso-pneumatic pump and has had in home training. Encouraged to use daily as tolerated. Discussed re-training, but she is not agreeable to have some one come to her home. Discussed with  [employer] to arrange other options and they will assist patient in the clinic, when she is here.          Manual Lymphatic Drainage (MLD) 80   minutes   Treatment time: 12:00pm-1:20pm        Area to decongest:    [] UE          []  Right     []  Left      [] Trunk     [x] LE             [x]  Right     [x]  Left      [x] Trunk      Sequence used and effectiveness: [] Secondary/Primary sequence for upper extremity with / without trunk involvement      [] Secondary sequence for lower extremities with  trunk involvement.      [x] Modifications were made to manual lymph drainage sequence to exclude cervical techniques secondary to medical history/age     [x] Self MLD sequence/techniques/hand strokes  Continued education this visit to improve techniques. Skin/wound care/debridement: Patient's skin is intact bilaterally. She continues with hyperpigmentation below knees and with dry skin.  She increased swelling at the knee and above with texture changes from increased swelling. Patient does have an ointment that was prescribed for her lower legs by MD, but she has not brought it in to the clinic. Recommended to bring in this ointment in for her visits, but to date has not. Reminded patient that daily to BID skin care is an important part of her home program.     Upper/Lower extremity compression: Patient arrived wearing the B LE Solaris Ready Wrap velcro systems size XL/tall for lower legs. Patient was also wearing her Solaris Ready Wrap Thigh and Knee pieces on the L LE that she ordered last visit. She will look into foot pieces on upcoming visit. Patient comfortable in the products and able to don them herself with increased time. Discussed that she needed to improve how she dons the knee portion, but other wise patient managing the new products well. Patient will need new lower leg garments soon, but she would like to wait until follow up to determine the remainder of her garment needs.      Patient has all needed MLB supplies to use in the home as needed. Kinesiotaping: Deferred    Girth/Volume measurement:  Will reassess volumes on her next visit.       TOTAL TREATMENT 80 mins           ASSESSMENT:   Treatment effectiveness and tolerance: Patient able to obtain one set of upper leg velcro garments to use with her current lower leg garments. Patient will work with these items and return for follow up so that we can determine the last items that she will need for discharge to the restorative phase. Patient could have been discharged back in May if she had followed through with her custom compression order. Patient had a lapse in follow up as she did not reschedule and returned in August wanting to pursue compression. Because of her frequency it has been difficult to meet remaining goals and discharge her to the restorative phase of care.  Now that patient has progressed to ordering items that she needs for compression hopeful that she will be able to get the remaining garments and transition to the restorative phase of care over the next couple of visits. Once patient has all needed compression and is aware of how to order additional garments as needed she should be ready to transition to the restorative phase of care. Patient will bring in her garments to next visit so that they can be assessed and will try to have [employer]  present to assist. Retraining of her Flexitouch Vaso-pneumatic device has been difficult because patient does not want anyone to come to her home. Patient aware to contact the clinic with questions if needed prior to her next visit. Progress toward goals: Patient met all STGs and LTG 2+3. Will continue LTG 1 and LTG 4.  See goal section of note for details.           PLAN OF CARE:   Changes to the plan of care: Continue with Plan of Care    Frequency: []  2 times a week   []  Weekly  [x]  Biweekly  [x]  Monthly        Kb Jessica, PTA, CLT   Annie De Los Santos, PT, CLT

## 2019-12-10 ENCOUNTER — OFFICE VISIT (OUTPATIENT)
Dept: INTERNAL MEDICINE CLINIC | Age: 69
End: 2019-12-10

## 2019-12-10 VITALS
WEIGHT: 293 LBS | OXYGEN SATURATION: 96 % | RESPIRATION RATE: 18 BRPM | TEMPERATURE: 98 F | DIASTOLIC BLOOD PRESSURE: 69 MMHG | HEIGHT: 66 IN | SYSTOLIC BLOOD PRESSURE: 151 MMHG | HEART RATE: 66 BPM | BODY MASS INDEX: 47.09 KG/M2

## 2019-12-10 DIAGNOSIS — F32.1 MODERATE MAJOR DEPRESSION (HCC): ICD-10-CM

## 2019-12-10 DIAGNOSIS — I25.10 ASCVD (ARTERIOSCLEROTIC CARDIOVASCULAR DISEASE): Primary | ICD-10-CM

## 2019-12-10 DIAGNOSIS — I89.0 LYMPHEDEMA OF BOTH LOWER EXTREMITIES: ICD-10-CM

## 2019-12-10 DIAGNOSIS — I48.0 PAF (PAROXYSMAL ATRIAL FIBRILLATION) (HCC): ICD-10-CM

## 2019-12-10 DIAGNOSIS — I73.9 PVD (PERIPHERAL VASCULAR DISEASE) (HCC): ICD-10-CM

## 2019-12-10 DIAGNOSIS — Z99.89 OSA ON CPAP: ICD-10-CM

## 2019-12-10 DIAGNOSIS — N18.2 CKD (CHRONIC KIDNEY DISEASE) STAGE 2, GFR 60-89 ML/MIN: ICD-10-CM

## 2019-12-10 DIAGNOSIS — E66.01 MORBID OBESITY WITH BMI OF 50.0-59.9, ADULT (HCC): ICD-10-CM

## 2019-12-10 DIAGNOSIS — G47.33 OSA ON CPAP: ICD-10-CM

## 2019-12-10 PROBLEM — Z00.00 MEDICARE ANNUAL WELLNESS VISIT, SUBSEQUENT: Status: RESOLVED | Noted: 2019-11-18 | Resolved: 2019-12-10

## 2019-12-10 PROBLEM — R73.03 PREDIABETES: Status: ACTIVE | Noted: 2019-12-10

## 2019-12-10 PROBLEM — E74.39 GLUCOSE INTOLERANCE: Status: RESOLVED | Noted: 2019-11-15 | Resolved: 2019-12-10

## 2019-12-10 RX ORDER — SIMVASTATIN 20 MG/1
TABLET, FILM COATED ORAL
Qty: 90 TAB | Refills: 3 | Status: SHIPPED | OUTPATIENT
Start: 2019-12-10 | End: 2020-11-11

## 2019-12-10 RX ORDER — BUPROPION HYDROCHLORIDE 150 MG/1
TABLET, EXTENDED RELEASE ORAL
Qty: 180 TAB | Refills: 3 | Status: SHIPPED | OUTPATIENT
Start: 2019-12-10 | End: 2020-02-17

## 2019-12-10 RX ORDER — METOPROLOL TARTRATE 50 MG/1
TABLET ORAL
Qty: 180 TAB | Refills: 3 | Status: SHIPPED | OUTPATIENT
Start: 2019-12-10 | End: 2021-02-10 | Stop reason: SDUPTHER

## 2019-12-10 RX ORDER — LISINOPRIL 20 MG/1
40 TABLET ORAL DAILY
Qty: 180 TAB | Refills: 3 | Status: SHIPPED | OUTPATIENT
Start: 2019-12-10 | End: 2021-02-10 | Stop reason: SDUPTHER

## 2019-12-10 NOTE — PROGRESS NOTES
Reviewed record in preparation for visit and have obtained necessary documentation. Identified pt with two pt identifiers(name and ). Chief Complaint   Patient presents with   [facility] Maintenance Due   Topic Date Due    Shingrix Vaccine Age 50> (1 of 2) 1999    GLAUCOMA SCREENING Q2Y  2014    COLONOSCOPY  2019    BREAST CANCER SCRN MAMMOGRAM  2020       Ms. Kelly Castellon has a reminder for a \"due or due soon\" health maintenance. I have asked that she discuss health maintenance topic(s) due with Her  primary care provider. Coordination of Care Questionnaire:  :     1) Have you been to an emergency room, urgent care clinic since your last visit? no   Hospitalized since your last visit? no             2) Have you seen or consulted any other health care providers outside of [address] [place] [address] since your last visit? no  (Include any pap smears or colon screenings in this section.)    3) Do you have an Advance Directive on file? no    4) Are you interested in receiving information on Advance Directives? NO    Patient is accompanied by self I have received verbal consent from Gene Holley to discuss any/all medical information while they are present in the room.
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
## 2019-12-10 NOTE — PATIENT INSTRUCTIONS
When You Are Overweight: Care Instructions Your Care Instructions If you're overweight, your doctor may recommend that you make changes in your eating and exercise habits. Being overweight can lead to serious health problems, such as high blood pressure, heart disease, type 2 diabetes, and arthritis, or it can make these problems worse. Eating a healthy diet and being more active can help you reach and stay at a healthy weight. You don't have to make huge changes all at once. Start by making small changes in your eating and exercise habits. To lose weight, you need to burn more calories than you take in. You can do this by eating healthy foods in reasonable amounts and becoming more active every day. Follow-up care is a key part of your treatment and safety. Be sure to make and go to all appointments, and call your doctor if you are having problems. It's also a good idea to know your test results and keep a list of the medicines you take. How can you care for yourself at home? · Improve your eating habits. You'll be more successful if you work on changing one eating habit at a time. All foods, if eaten in moderation, can be part of healthy eating. Remember to: 
? Eat a variety of foods from each food group. Include grains, vegetables, fruits, dairy, and protein foods. ? Limit foods high in fat, sugar, and calories. ? Eat slowly. And don't do anything else, such as watch TV, while you are eating. ? Pay attention to portion sizes. Put your food on a smaller plate. ? Plan your meals ahead of time. You'll be less likely to grab something that's not as healthy. · Get active. Regular activity can help you feel better, have more energy, and burn more calories. If you haven't been active, start slowly. Start with at least 30 minutes of moderate activity on most days of the week. Then gradually increase the amount of activity.  Try for 60 or 90 minutes a day, at least 5 days a week. There are a lot of ways to fit activity into your life. You can: 
? Walk or bike to the store. Or walk with a friend, or walk the dog. 
? Mow the lawn, rake leaves, shovel snow, or do some gardening. ? Use the stairs instead of the elevator, at least for a few floors. · Change your thinking. Your thoughts have a lot to do with how you feel and what you do. When you're trying to reach a healthy weight, changing how you think about certain things may help. Here are some ideas: 
? Don't compare yourself to others. Healthy bodies come in all shapes and sizes. ? Pay attention to how hungry or full you feel. When you eat, be aware of why you're eating and how much you're eating. ? Focus on improving your health instead of dieting. Dieting almost never works over the long term. · Ask your doctor about other health professionals who can help you reach a healthy weight. ? A dietitian can help you make healthy changes in your diet. ? An exercise specialist or  can help you develop a safe and effective exercise program. 
? A counselor or psychiatrist can help you cope with issues such as depression, anxiety, or family problems that can make it hard to focus on reaching a healthy weight. · Get support from your family, your doctor, your friends, a support groupand support yourself. Where can you learn more? Go to http://ayla-johan.info/. Enter R378 in the search box to learn more about \"When You Are Overweight: Care Instructions. \" Current as of: March 28, 2019 Content Version: 12.2 © 2385-4552 [employer]. Care instructions adapted under license by [employer] (which disclaims liability or warranty for this information). If you have questions about a medical condition or this instruction, always ask your healthcare professional. [address] any warranty or liability for your use of this information. Would try to get an eye exam, want to be screened for glaucoma. Would also recommend getting started on vaccines to prevent shingles--shingrix.

## 2019-12-24 ENCOUNTER — APPOINTMENT (OUTPATIENT)
Dept: PHYSICAL THERAPY | Age: 69
End: 2019-12-24
Payer: MEDICARE

## 2020-01-02 NOTE — PROGRESS NOTES
[place]  [facility]  [address] [facility], [address]       LYMPHEDEMA THERAPY  VISIT: 14               []                  Daily note                  [x]                 90 day Reassessment with Updated Plan of Care/Progress Note     NAME: Eveline Figueroa  DATE: 11/20/2019      GOALS  Short term goals (met)  Time frame: to be met by 3/15/2019  1.  Patient will demonstrate knowledge of signs/symptoms of infections/cellulitis and be independent in skin care to prevent cellulitis. Patient educated on skin care and prevention of cellulitis and infections. Continued education in daily skin care with a low Ph lotion using MLD techniques. Patient is using Eucerin lotion for skin care. Patient has been educated in signs and symptoms of cellulitis. Goal met 3/27/2019. 2.  Patient will demonstrate independence in lymphedema home program of therapeutic exercises to improve circulation and decongest limb to improve ADLs. Patient has been educated in the Active ROM routine with modifications and was educated in the Hope routine with modifications this visit. Patient instructed to perform a complete routine one to two times per day as tolerated. Goal met 3/27/2019.     Short term goal extended to 5/5/19:  3.  Patient will tolerate multi-layer bandages (MLB) and show measureable decrease in limb volume or participate in the selection process to allow ordering of home compression system (daytime, nighttime garments and pump as needed). Patient has received the B LE Solaris Ready Wrap lower leg garments (XL) in black from the vendor, [employer]. She reports that she did not open the extender strap because she did not need it. Patient received the vaso-pneumatic pump with sequential pressures and truncal component in the mail at last visit and since then has had her in home training.  She reports that she has not used her pump since training. Encouraged her to use the pump daily conservatively once she has completed care for her pneumonia. Patient today wanted to proceed with be measured for compression knee highs. Due to the severity of her swelling and shape of her lower legs she was shown options and measured for custom flat knit Elvarex Knee highs today. Patient wanted to explore options for vendors, so garments were not ordered today and patient is going to pursue ordering them on her on and contact clinic if we need to submit order to a vendor of her choice. All items for compression have been received or she is in the process of obtaining. Goal Met 4/30/19     Long term goals (met)  Time frame: to be met by 5/5/2019   2.  Patient will be independent with don/doff of compression system and use in order to prevent reaccumulation of fluid at discharge. Patient is able to independently don and doff the Solaris Ready Wrap velcro products in the clinic. Goal met 3/27/2019.     3.  Pt will be independent in self-MLD and show stable limb volumes showing decongestion and pt. ready for transition to independent restorative phase of lymphedema therapy. Continued education in the B LE self MLD packet. Full volumetric measurements taken today reveal a loss of 2580 mL in the uninvolved extremity and a loss of 1698 ml in the involved extremity since her last visit on 3/27/2019. Since evaluation on 2/8/19 patient has lost a total of 4899 ml on the L LE and 4997 ml on the R LE. Volumes today at the lowest they have been since seeking treatment. Goal Met 4/30/19         Long term goals extended  Time frame: to be met by 10/28/19  1.  Pt will show improvement in the [facility] Lymphedema Assessment Scale by decreasing the score to 5/20 and thus allow improvement in patient's quality of life. Patient's score remains at 8/20. Will need to extend goal to work towards improving score. New goal 4.  Patient will obtain appropriate compression for her B LEs  (ordered and fitted) prior to her discharge to the restorative phase of care. independence with don/doff prior to discharge. Measured for custom flat knit knee highs on  4/30/19 and patient never completed the order. Patient wanted to find a vendor to purchase on her own so a list of several local and national vendors issued to patient. Patient did not follow through with recommendations and went to local pharmacy and measured for ready made knee highs that arrived and did not fit. Patient interested in getting velcro compression garments for her upper leg last visit. Options were discussed with patient and she was measured for Solaris Ready Wrap Thigh and knee pieces both in XX Large and also recommended Compression sock liners. Order was placed with [employer] and patient completed order on her own. Patient did need some instruction on how to improve donning the knee portion, but otherwise she was doing well with new products. Patient measured today for more velcro products and also for Compression Capris. Patient will return to the clinic after securing the items and working with them daily to reassess her volumes.               SUBJECTIVE REPORT: Patient arrived today for follow up and is interesting in purchasing more compression velcro garments. Patient also brought in her vaso-pneumatic pump garments to have them assessed as she does not feel that her pump is working well. [employer]  was able to be present to assist her with refitting of her garments and it was determined that the trunk portion was not fitting well and also that there needs to be adjustments of the lower leg garments to be more effective. These changes were made for patient and new garments for her device were ordered. Pain:0/10  Gait:    [x]  Independent gait with cane for community distances. Sitting rest breaks as needed.  Patient would benefit from walking with a rolling walker to improve her gait and take pressure off of her knees. Patient has a walker at home, but does not bring it to her appointments. ADLs: Modified Independent and able to don and doff compression products without assistance. Treatment Response:    []   Patient reviewed packet received at evaluation  []   Patient completed home program as prescribed  [x]   Patient partially compliant with home program to date   [x]   Patient able to adjust to her new upper thigh garments and is ready to proceed with upper thigh garments for the other leg and also compression capris. Patient's lower garments are wearing out as she has had them for some time, so she was measured for new ones and encouraged to purchase more as they would be more effective. Function:   [x]   Patient now independent with donning her compression velcro system. []   ADLs are requiring less assistance   []   Patient able to return to work/leisure activities  [address] Lymphedema Assessment Scale: Deferred   Weight: Deferred      TREATMENT AND OBJECTIVE DATA SUMMARY:   Patient/Family Education:        Educated in skin care: [x]   Skin care products  [x]   Hygiene/removal of garments for bathing and skin assessment. [x]  Prevention of cellulitis  []   Wound care      Educated in exercise: [x]   Walking program  [x]   Mel ball routine  []   Stick routine  [x]   ROM routine      Instructed in self MLD: Continued education in the B LE MLD packet and abdominal breathing techniques.      Instructed in don/doff of compression system:    [x]   Multi layer bandage (MLB) donning principles and wear precautions-Patient and  have been educated in how to don adequate bandage as needed. [x]   Day garments -Solaris Ready Wraps B LEs lower legs. Solaris Ready Wraps for upper leg. Patient ready to progress to ordering upper garments for the other leg and also compression capris today.    []   Night garments      Therapeutic Activity 0 minutes Treatment time: N/A  Functional Mobility: Modified Independent to supervision. Struggles with B knee pain R > L. Fall risk: Moderate (orthopedic issues causing knee pain and altered gait.)      Therapeutic Exercise/Procedure 0 minutes   Treatment time: 0  Mel ball exercise program: Deferred today   Free exercises/ROM: Deferred today. Patient encouraged to perform modified exercises in the home. Home program: Patient to perform daily to BID:  [x]   Skin care  [x]   Deep abdominal breathing  [x]   Exercise routine  [x]   Walking program  [x]   Rest in supine   [x]   Compression bandage   [x]   Compression garments (Solaris Ready Wraps):  [x]   Vasopneumatic device-encouraged daily use as tolerated. Patient had her vaso-pneumatic pump garments assessed today. [employer] rep adjusted garments and will order new products to replace her current garments.    []   Wound care  [x]   Self MLD  [x]   Bring supplies to each therapy visit  []   Purchase necessary supplies:   [x]   Weight loss program   []   Follow up with   Rationale: Exercise will increase the lymph angiomotoricity and tissue pressure of the skin and thus decrease swelling.      Modalities 40  minutes (not billable patient begin assisted by [employer]  to assist her with issues with her pump)   Treatment time: 1:00pm-1:40pm  Vasopneumatic pump: Patient received the Flexitouch Plus vaso-pneumatic pump and has had in home training. Encouraged to use daily as tolerated. Discussed re-training, but she is not agreeable to have some one come to her home. [employer]  able to arrange schedule to come in today while patient was here for treatment to assess her garments in the clinic. There were some issues with her current garment fit and also trunk garment not inflating fully. [employer]  was very helpful at problem solving her issues and will send patient new garments.  Patient not allowing anyone to come to her home, so will assist with fitting of the garments if needed on her next visit.           Manual Lymphatic Drainage (MLD) 80  minutes   Treatment time: 12:00pm-2:00pm         Area to decongest:    [] UE          []  Right     []  Left      [] Trunk     [x] LE             [x]  Right     [x]  Left      [x] Trunk      Sequence used and effectiveness: [] Secondary/Primary sequence for upper extremity with / without trunk involvement      [] Secondary sequence for lower extremities with  trunk involvement.      [x] Modifications were made to manual lymph drainage sequence to exclude cervical techniques secondary to medical history/age     [x] Self MLD sequence/techniques/hand strokes  Continued education this visit to improve techniques. Skin/wound care/debridement: Patient's skin is intact bilaterally. She continues with hyperpigmentation below knees and with dry skin. She increased swelling at the knee and above with texture changes from increased swelling. Patient does have an ointment that was prescribed for her lower legs by MD, but she has not brought it in to the clinic. Recommended to bring in this ointment in for her visits, but to date has not. Reminded patient that daily to BID skin care is an important part of her home program.     Upper/Lower extremity compression: Patient arrived wearing the B LE Solaris Ready Wrap velcro systems size XL/tall for lower legs. Patient was also wearing her Solaris Ready Wrap Thigh on the L LE. She did not wear the knee piece in today. She will look into foot pieces on upcoming visit. Patient comfortable in the products and able to don them herself with increased time. Today measurements taken so that she can obtain upper leg garments for the R LE and also new lower leg velcro products for bilateral lower extremities. Patient also agreeable to be measured for and order [employer] Compre[employer] Capris in 3X large.  Patient prefers to order items herself from [place] [id number] so handout provided of the items and sizing of the garments that were measured for today and also placed in paper chart for reference. Patient is still unsure of what style of foot pieces that she would like so we will assess on next visit.       Patient has all needed MLB supplies to use in the home as needed. Kinesiotaping: Deferred    Girth/Volume measurement: Full volumes taken today to reveal that patient has lost 419 ml on the R LE and gained 197 ml on the L LE since volumes were last taken on 8/20/19. Patient has lost a total of 4982 ml on the R LE and 3656 ml on the L LE since evaluation on 2/19/19.  (500 ml=1 pound) Patient has lost approximately 17 pounds in bilateral lower extremities since evaluation. R LE: 17,517.67 ml today compared to 22,499.84 ml on evaluation 2/08/19  L LE: 18,645.14 ml today compared to 22,300.95 ml on evaluation 2/8/19  Trunk Girths:  Waist- 128.8 cm  Hips-159 cm      TOTAL TREATMENT  120 mins           ASSESSMENT:   Treatment effectiveness and tolerance: Patient has been able to make improvements with her additional velcro garments and is ready to proceed with ordering additional garments so that she has bilateral upper leg coverage. Patient aware that her lower leg velcro products also need to be replaced soon so she was given sizing information on these as well. Patient also able to be measured for compression capris today. Patient will order the items and is aware if there are fit issues to call her vendor for returns. Patient to return to the clinic and should be able to be discharged to the restorative phase of care soon once she has obtained all of her products. Patient could have been discharged back in May if she had followed through with her custom compression order as recommended. Patient had a lapse in follow up as she did not reschedule and returned in August wanting to pursue compression.  Because of her frequency it has been difficult to meet remaining goals and discharge her to the restorative phase of care. Patient has progressed to ordering items that she needs for compression hopeful that she will be able to get the remaining garments and transition to the restorative phase of care over the next couple of visits. Patient was able to have her garments assessed by [employer] rep today and it was determined she needed new garments as there were issues with the inflation on the trunk unit. Patient to receive new products and can bring them to the clinic for adjustment/fitting if needed. Progress toward goals: Patient met all STGs and LTG 2+3. Will continue LTG 1 and LTG 4. See goal section of note for details.           PLAN OF CARE:   Changes to the plan of care: Will request updated plan of care to continue to follow patient in order to work toward remaining goals. Frequency: []  2 times a week   []  Weekly  []  Biweekly  [x]  Monthly        Kb Jessica, PTA, CLT  Kevyn Min PT, CLT     TREATMENT PLAN EFFECTIVE DATES:   10/29/19 TO 1/24/2020  I have read the above plan of care for Krissy Bergman. I certify the above prescribed services are required by this patient and are medically necessary.   The above plan of care has been developed in conjunction with the lymphedema/physical therapist.   Physician Signature: ____________________________________________________      Dr. Rosangela Patel  Date: _____________

## 2020-01-06 ENCOUNTER — HOSPITAL ENCOUNTER (OUTPATIENT)
Dept: PHYSICAL THERAPY | Age: 70
Discharge: HOME | End: 2020-01-06
Payer: MEDICARE

## 2020-01-06 PROCEDURE — 97140 MANUAL THERAPY 1/> REGIONS: CPT

## 2020-01-06 NOTE — PROGRESS NOTES
[facility]  Physical Therapy Lymphedema Clinic  [address], [address], [facility] 22.       LYMPHEDEMA THERAPY  VISIT: 15               []                  Daily note                  [x]                 Discharge Summary/Progress Note     NAME: Natasha Cronin  DATE: 1/06/2020      GOALS  Short term goals (met)  Time frame: to be met by 3/15/2019  1.  Patient will demonstrate knowledge of signs/symptoms of infections/cellulitis and be independent in skin care to prevent cellulitis. Patient educated on skin care and prevention of cellulitis and infections. Continued education in daily skin care with a low Ph lotion using MLD techniques. Patient is using Eucerin lotion for skin care. Patient has been educated in signs and symptoms of cellulitis. Goal met 3/27/2019. 2.  Patient will demonstrate independence in lymphedema home program of therapeutic exercises to improve circulation and decongest limb to improve ADLs. Patient has been educated in the Active ROM routine with modifications and was educated in the Hope routine with modifications this visit. Patient instructed to perform a complete routine one to two times per day as tolerated. Goal met 3/27/2019.     Short term goal extended to 5/5/19:  3.  Patient will tolerate multi-layer bandages (MLB) and show measureable decrease in limb volume or participate in the selection process to allow ordering of home compression system (daytime, nighttime garments and pump as needed). Patient has received the B LE Solaris Ready Wrap lower leg garments (XL) in black from the vendor, [employer]. She reports that she did not open the extender strap because she did not need it. Patient received the vaso-pneumatic pump with sequential pressures and truncal component in the mail at last visit and since then has had her in home training. She reports that she has not used her pump since training.  Encouraged her to use the pump daily conservatively once she has completed care for her pneumonia. Patient today wanted to proceed with be measured for compression knee highs. Due to the severity of her swelling and shape of her lower legs she was shown options and measured for custom flat knit Elvarex Knee highs today. Patient wanted to explore options for vendors, so garments were not ordered today and patient is going to pursue ordering them on her own and contact clinic if we need to submit order to a vendor of her choice. All items for compression have been received or she is in the process of obtaining. Goal Met 4/30/19     Long term goals (met)  Time frame: to be met by 5/5/2019   2.  Patient will be independent with don/doff of compression system and use in order to prevent reaccumulation of fluid at discharge. Patient is able to independently don and doff the Solaris Ready Wrap velcro products in the clinic. Goal met 3/27/2019.     3.  Pt will be independent in self-MLD and show stable limb volumes showing decongestion and pt. ready for transition to independent restorative phase of lymphedema therapy. Continued education in the B LE self MLD packet. Full volumetric measurements taken today reveal a loss of 2580 mL in the uninvolved extremity and a loss of 1698 ml in the involved extremity since her last visit on 3/27/2019. Since evaluation on 2/8/19 patient has lost a total of 4899 ml on the L LE and 4997 ml on the R LE. Volumes today at the lowest they have been since seeking treatment. Goal Met 4/30/19     Long term goals extended  Time frame: to be met by 10/28/19 (Extended to 1/24/2020)  1.  Pt will show improvement in the [facility] Lymphedema Assessment Scale by decreasing the score to 5/20 and thus allow improvement in patient's quality of life. Patient scored a 3 out of 20 today on the scale today. Goal Met 1/6/2020    New goal 4.  Patient will obtain appropriate compression for her B LEs (ordered and fitted) prior to her discharge to the restorative phase of care. independence with don/doff prior to discharge. Measured for custom flat knit knee highs on 4/30/19 and patient never completed the order. Patient wanted to find a vendor to purchase on her own so a list of several local and national vendors issued to patient. Patient did not follow through with recommendations and went to local pharmacy and measured for ready made knee highs that arrived and did not fit. Patient received velcro compression garments for Solaris Ready Wrap Thigh and knee pieces both in XX Large and also recommended Compression sock liners in addition to her lower leg Solaris Velcro products. Patient interested in a getting new foot pieces so was measured for and tried a sample in the clinic of medium Solaris Ready Wrap/Foot. She will order these today. She was also encouraged to purchase new lower leg velcro products and they were wearing out and not as effective. Patient ordered and returned her compression capris, but has ordered and awaiting some capris that she ordered on her own online. Patient is aware of how to order garments and the return process so feels comfortable with discharge today as she has worked with all products and is just awaiting to receive them. Patient would benefit in the future from being fitted with custom knee highs/capris combination once she further reduces. Goal Met 1/6/2020              SUBJECTIVE REPORT: Patient arrived today only wearing her compression liner socks. Patient did not bring in her products for assessment today. Patient reports that she received her products for upper leg for the R LE and has been wearing them, but returned her Compression Capris she ordered. Patient reports that she can see improvement with use of her products. Today she reports that she feels her legs are worse, but explained to her swelling occurring because she did not wear her compression.   Patient reports that she would like to purchase some velcro foot pieces and had researched them herself. Able to teach and demonstrate the pros and cons with all today in the clinic and she will order on her own the [employer]01 foot pieces in medium. Pain:0/10  Gait:    [x]  Independent gait with cane for community distances. Sitting rest breaks as needed. Patient would benefit from walking with a rolling walker to improve her gait and take pressure off of her knees. Patient has a walker at home, but does not bring it to her appointments. ADLs: Modified Independent and able to don and doff compression products without assistance. Treatment Response:    []   Patient reviewed packet received at evaluation  []   Patient completed home program as prescribed  [x]   Patient partially compliant with home program to date   [x]   Patient has or is ordering all needed garments for the restorative phase of care. Patient reports wearing daily, but arrived today with only the compression liner socks on and questioning why her swelling was up today. Reminded patient that in order to continue to maintain reduction and continue to improve her swelling she needs to be compliant with daily garment use. Also her lower leg velcro is ready to be replaced. Function:   [x]   Patient now independent with donning her compression velcro system. []   ADLs are requiring less assistance   []   Patient able to return to work/leisure activities  [facility] Lymphedema Assessment Scale: Patient scored a 3 out of 20 on the lymphedema scale. Weight: 315 lbs       TREATMENT AND OBJECTIVE DATA SUMMARY:   Patient/Family Education:        Educated in skin care: [x]   Skin care products  [x]   Hygiene/removal of garments for bathing and skin assessment.   [x]  Prevention of cellulitis  []   Wound care      Educated in exercise: [x]   Walking program  [x]   Mel ball routine  []   Stick routine  [x]   ROM routine      Instructed in self MLD: Continued education in the B LE MLD packet and abdominal breathing techniques.      Instructed in don/doff of compression system:    [x]   Multi layer bandage (MLB) donning principles and wear precautions-Patient and  have been educated in how to don adequate bandage as needed. [x]   Day garments -Solaris Ready Wraps B LEs lower legs. Solaris Ready Wraps for upper legs. Patient ordered, but returned her Sigvaris compression capris and will try a different brand as she did not like the fit. Patient will also order foot pieces (Solaris Ready Wrap/Foot) and recommended ordering new lower leg velcro products as her current garments are wearing out as well. []   Night garments      Therapeutic Activity 0 minutes   Treatment time: N/A  Functional Mobility: Modified Independent to supervision. Struggles with B knee pain R > L. Fall risk: Moderate (orthopedic issues causing knee pain and altered gait.)      Therapeutic Exercise/Procedure 0 minutes   Treatment time: 0  Mel ball exercise program: Deferred today   Free exercises/ROM: Deferred today. Patient encouraged to perform modified exercises in the home. Home program: Patient to perform daily to BID:  [x]   Skin care  [x]   Deep abdominal breathing  [x]   Exercise routine  [x]   Walking program  [x]   Rest in supine   [x]   Compression bandage   [x]   Compression garments  [x]   Vasopneumatic device  []   Wound care  [x]   Self MLD  [x]   Bring supplies to each therapy visit  [x]   Purchase necessary supplies   [x]   Weight loss program   []   Follow up with   Rationale: Exercise will increase the lymph angiomotoricity and tissue pressure of the skin and thus decrease swelling.      Modalities 0  minutes   Treatment time: 0  Vasopneumatic pump: Patient now with well functioning garments for her Flexitouch and was encouraged to use it daily.  Currently not using daily as recommended.          Manual Lymphatic Drainage (MLD) 105  Minutes (90 minutes billable)    Treatment time: 12:35pm- 14:20pm        Area to decongest:    [] UE          []  Right     []  Left      [] Trunk     [x] LE             [x]  Right     [x]  Left      [x] Trunk      Sequence used and effectiveness: [] Secondary/Primary sequence for upper extremity with / without trunk involvement      [x] Secondary sequence for lower extremities with  trunk involvement.      [x] Modifications were made to manual lymph drainage sequence to exclude cervical techniques secondary to medical history/age     [x] Self MLD sequence/techniques/hand strokes  Continued education this visit to improve techniques. Skin/wound care/debridement: Patient's skin is intact bilaterally. She continues with hyperpigmentation below knees and with dry skin. There has been improvement in the softening/texture since evaluation. Patient does have an ointment that was prescribed for her lower legs by MD, but she has not brought it in to the clinic. Recommended to bring in this ointment in for her visits, but to date has not. Reminded patient that daily to BID skin care is an important part of her home program.     B LEs  1/6/2020 2/8/2019 (evaluation)      2/8/2019 (evaluation)      R LE   1/6/2020 2/8/2019 (evaluation)        L LE:  1/6/2020 2/8/2019 (evaluation)        Upper/Lower extremity compression: Patient arrived wearing only her [employer]aid Compressive liner socks (25-35mmHg). Patient has the following products for use: B LE Solaris Ready Wrap velcro systems size XL/tall for lower legs, and was measured for and tried sample of Solaris Ready Wrap velcro foot pieces and will order those in size medium later today for use during the restorative phase of care. Patient ordered and received her compressive liner socks, but she returned them because she did not like the fit. Patient will try another style and is awaiting for those to arrive.    Because patient did not wear in any compression or bring in any compression garments with the exception of her compressive liner socks, her volumes were up bilaterally from last visit. Patient is aware how to order and return products for online (currently she is using [employer] and states that is easiest for her to do)  Patient has all needed MLB supplies to use in the home as needed. Kinesiotaping: Deferred    Girth/Volume measurement: Full volumes taken today to reveal that patient has gained 1179 ml on the R LE and 491 ml on the L LE. Patient has lost a total of 3803 ml on the R LE and 3165 ml on the L LE since initial evaluation on 2/8/19. R  LE 18,696.77 ml today compared to 22,499.84 ml on evaluation 2/8/19  L LE 19,136.26 ml today compared to 22,300.95 ml on evaluation 2/8/19  Patient has lost approximately 14 pounds in B LEs since beginning treatment. (500 ml = 1 pound)   Fmylx=463 cm  Hips=156 cm  See scanned documents for graphs.       TOTAL TREATMENT  105 mins           ASSESSMENT:   Treatment effectiveness and tolerance: Patient has been seen for 15 visits since 2/8/19. Patient has met all short term and long term goals and has all needed products in place and will be ordering additional products to use during the restorative phase of care. Patient has not been fully compliant with her home program so encouraged her to utilize her compression and pump daily and perform skin care and perform modified exercises daily in order to continue to improve and reduce volumes/girths. Patient will return in 6 months for follow up. At that time, patient will be reassessed for appropriateness of custom flat knit garments and if she wants to pursue these vs velcro products. Patient is aware to contact the clinic if she has any questions prior to returning for follow up. Progress toward goals: Patient met all STGs and LTGs.  See goal section of note for details.           PLAN OF CARE:   Changes to the plan of care: Discharge to the restorative phase of care.     Frequency: []  2 times a week   []  Weekly  []  Biweekly  [x]  Follow Up in 6 Months       Kb Jessica, PTA, CLT   Luisana Middleton PT, CLT

## 2020-01-14 VITALS — WEIGHT: 293 LBS | BODY MASS INDEX: 47.09 KG/M2 | HEIGHT: 66 IN

## 2020-02-17 RX ORDER — BUPROPION HYDROCHLORIDE 150 MG/1
TABLET, EXTENDED RELEASE ORAL
Qty: 180 TAB | Refills: 3 | Status: SHIPPED | OUTPATIENT
Start: 2020-02-17 | End: 2020-10-16 | Stop reason: SDUPTHER

## 2020-02-17 NOTE — TELEPHONE ENCOUNTER
PCP: Greta Goncalves DO    Last appt: 11/18/2019  Future Appointments   Date Time Provider Faye Taylre   2/27/2020  9:30 PM [facility] [address],  [address] SLEEP LAB [place]   6/11/2020  2:00 PM Jaquelin, [address]   7/10/2020  1:30 PM Rachael Ingalls, PT [facility]        Requested Prescriptions     Pending Prescriptions Disp Refills    buPROPion SR (WELLBUTRIN SR) 150 mg SR tablet [Pharmacy Med Name: BUPROPION SR 150MG TABLETS (12 H)] 180 Tab 3     Sig: TAKE 1 TABLET BY MOUTH TWICE DAILY       Prior labs and Blood pressures:  BP Readings from Last 3 Encounters:   12/10/19 151/69   11/18/19 160/80   07/05/19 130/68     Lab Results   Component Value Date/Time    Sodium 144 11/18/2019 02:40 PM    Potassium 5.7 (H) 11/18/2019 02:40 PM    Chloride 104 11/18/2019 02:40 PM    CO2 32.0 11/18/2019 02:40 PM    Anion gap 8 11/18/2019 02:40 PM    Glucose 92 11/18/2019 02:40 PM    BUN 30.0 (H) 11/18/2019 02:40 PM    Creatinine 1.1 11/18/2019 02:40 PM    BUN/Creatinine ratio 27 11/18/2019 02:40 PM    GFR est AA 60 (H) 11/18/2019 02:40 PM    GFR est non-AA 49 11/18/2019 02:40 PM    Calcium 10.7 (H) 11/18/2019 02:40 PM     Lab Results   Component Value Date/Time    Hemoglobin A1c 5.4 11/18/2019 02:40 PM    Hemoglobin A1c (POC) 5.2 09/28/2018 12:00 PM     Lab Results   Component Value Date/Time    Cholesterol, total 186 11/18/2019 02:40 PM    Cholesterol (POC) 151.0 09/28/2018 12:00 PM    HDL Cholesterol 81 11/18/2019 02:40 PM    HDL Cholesterol (POC) 67.0 09/28/2018 12:00 PM    LDL Cholesterol (POC) 52.6 09/28/2018 12:00 PM    LDL, calculated 70 11/18/2019 02:40 PM    VLDL 35 11/18/2019 02:40 PM    Triglyceride 174 11/18/2019 02:40 PM    Triglycerides (POC) 157.0 09/28/2018 12:00 PM    CHOL/HDL Ratio 2 11/18/2019 02:40 PM     Lab Results   Component Value Date/Time    VITAMIN D, 25-HYDROXY 59 11/18/2019 02:40 PM       Lab Results   Component Value Date/Time    TSH, 3rd generation 2.08 11/18/2019 02:40 PM

## 2020-02-27 ENCOUNTER — HOSPITAL ENCOUNTER (OUTPATIENT)
Dept: SLEEP MEDICINE | Age: 70
Discharge: HOME | End: 2020-02-27
Payer: MEDICARE

## 2020-02-27 VITALS
TEMPERATURE: 98.5 F | BODY MASS INDEX: 47.09 KG/M2 | OXYGEN SATURATION: 96 % | SYSTOLIC BLOOD PRESSURE: 150 MMHG | DIASTOLIC BLOOD PRESSURE: 89 MMHG | HEIGHT: 66 IN | WEIGHT: 293 LBS | HEART RATE: 84 BPM

## 2020-02-27 DIAGNOSIS — G47.33 OBSTRUCTIVE SLEEP APNEA (ADULT) (PEDIATRIC): Primary | ICD-10-CM

## 2020-02-27 PROCEDURE — 95811 POLYSOM 6/>YRS CPAP 4/> PARM: CPT | Performed by: INTERNAL MEDICINE

## 2020-02-27 PROCEDURE — 95810 POLYSOM 6/> YRS 4/> PARAM: CPT | Performed by: INTERNAL MEDICINE

## 2020-03-05 ENCOUNTER — TELEPHONE (OUTPATIENT)
Dept: SLEEP MEDICINE | Age: 70
End: 2020-03-05

## 2020-03-05 ENCOUNTER — DOCUMENTATION ONLY (OUTPATIENT)
Dept: SLEEP MEDICINE | Age: 70
End: 2020-03-05

## 2020-03-05 DIAGNOSIS — G47.33 OBSTRUCTIVE SLEEP APNEA (ADULT) (PEDIATRIC): Primary | ICD-10-CM

## 2020-03-05 NOTE — TELEPHONE ENCOUNTER
Results of sleep study in [facility] to convey results to patient  Attended polysomnogram showed an AH of 13/hour and lowest oxygen saturation was 89%. This is consistent with significant sleep apnea. This test provided the information necessary to obtain a new device for her. Based on these results, PAP therapy would continue to  be beneficial.   We were not able to start PAP during the night but I will be able to order an autoadjusting PAP device which will adjust within a range. APAP order attached. she should be seen in the sleep disorder center 4-6 weeks after initiating PAP therapy. The APAP will have modem access so she can call the sleep center if she  has questions/concerns regarding the initial PAP settings. Front staff to Order PAP and call patient and let them know which DME company they should be hearing from after results reviewed with lead support technologist.   Schedule for first adherence visit in 6 weeks.

## 2020-03-13 ENCOUNTER — DOCUMENTATION ONLY (OUTPATIENT)
Dept: SLEEP MEDICINE | Age: 70
End: 2020-03-13

## 2020-05-28 ENCOUNTER — TELEPHONE (OUTPATIENT)
Dept: SLEEP MEDICINE | Age: 70
End: 2020-05-28

## 2020-05-28 ENCOUNTER — DOCUMENTATION ONLY (OUTPATIENT)
Dept: SLEEP MEDICINE | Age: 70
End: 2020-05-28

## 2020-05-28 NOTE — PROGRESS NOTES
No response to virtual invitation, left message on her cell phone number  Will need to be rescheduled

## 2020-05-28 NOTE — TELEPHONE ENCOUNTER
Called, left message   Sent link for virtual visit.   If no response, she was advised that she would be contacted by our staff to reschedule her visit

## 2020-07-06 ENCOUNTER — TELEPHONE (OUTPATIENT)
Dept: INTERNAL MEDICINE CLINIC | Age: 70
End: 2020-07-06

## 2020-07-06 NOTE — TELEPHONE ENCOUNTER
Appointment scheduled for 7/16 @ 1030 with Dr. Luann Marie.   Pt verbalized understanding of information discussed w/ no further questions at this time.

## 2020-07-06 NOTE — TELEPHONE ENCOUNTER
----- Message from Syeda Aquino sent at 2020  9:52 AM EDT -----  Regarding: Dr. Sarthak Lepe Message/Vendor Calls  To: [facility]  Subject: Dr. Bhavani Allen  Patient's first and last name:  Danny Eng  ID numbers: #[id number]  : 1950  Caller's first and last name: N/A\"  Reason for call:  Pt does not want VV wants an in office visit. Prescription \"Bumex\" has , need new prescription. Callback required yes/no and why: Yes, to inform.    Best contact number(s): [phone]  Details to clarify the request: N/A

## 2020-07-10 ENCOUNTER — APPOINTMENT (OUTPATIENT)
Dept: PHYSICAL THERAPY | Age: 70
End: 2020-07-10

## 2020-07-16 ENCOUNTER — OFFICE VISIT (OUTPATIENT)
Dept: INTERNAL MEDICINE CLINIC | Age: 70
End: 2020-07-16

## 2020-07-16 VITALS
HEIGHT: 66 IN | BODY MASS INDEX: 47.09 KG/M2 | TEMPERATURE: 97.8 F | SYSTOLIC BLOOD PRESSURE: 149 MMHG | HEART RATE: 64 BPM | DIASTOLIC BLOOD PRESSURE: 77 MMHG | RESPIRATION RATE: 14 BRPM | WEIGHT: 293 LBS | OXYGEN SATURATION: 99 %

## 2020-07-16 DIAGNOSIS — I89.0 LYMPHEDEMA OF BOTH LOWER EXTREMITIES: Primary | ICD-10-CM

## 2020-07-16 DIAGNOSIS — N18.2 CKD (CHRONIC KIDNEY DISEASE) STAGE 2, GFR 60-89 ML/MIN: ICD-10-CM

## 2020-07-16 DIAGNOSIS — Z99.89 OSA ON CPAP: ICD-10-CM

## 2020-07-16 DIAGNOSIS — E66.01 MORBID OBESITY WITH BMI OF 50.0-59.9, ADULT (HCC): ICD-10-CM

## 2020-07-16 DIAGNOSIS — G47.33 OSA ON CPAP: ICD-10-CM

## 2020-07-16 DIAGNOSIS — I48.0 PAF (PAROXYSMAL ATRIAL FIBRILLATION) (HCC): ICD-10-CM

## 2020-07-16 PROBLEM — F32.1 MODERATE MAJOR DEPRESSION (HCC): Status: RESOLVED | Noted: 2018-10-02 | Resolved: 2020-07-16

## 2020-07-16 PROBLEM — I73.9 PVD (PERIPHERAL VASCULAR DISEASE) (HCC): Status: RESOLVED | Noted: 2018-10-02 | Resolved: 2020-07-16

## 2020-07-16 RX ORDER — BUMETANIDE 1 MG/1
1 TABLET ORAL DAILY
Qty: 90 TAB | Refills: 3 | Status: SHIPPED | OUTPATIENT
Start: 2020-07-16 | End: 2022-07-28 | Stop reason: SDUPTHER

## 2020-07-16 RX ORDER — DICLOFENAC SODIUM 75 MG/1
TABLET, DELAYED RELEASE ORAL
COMMUNITY
Start: 2020-04-20 | End: 2020-11-24
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
## 2020-10-16 ENCOUNTER — OFFICE VISIT (OUTPATIENT)
Dept: INTERNAL MEDICINE CLINIC | Age: 70
End: 2020-10-16
Payer: MEDICARE

## 2020-10-16 VITALS
DIASTOLIC BLOOD PRESSURE: 76 MMHG | HEART RATE: 66 BPM | OXYGEN SATURATION: 98 % | BODY MASS INDEX: 47.09 KG/M2 | WEIGHT: 293 LBS | RESPIRATION RATE: 18 BRPM | TEMPERATURE: 96.9 F | HEIGHT: 66 IN | SYSTOLIC BLOOD PRESSURE: 135 MMHG

## 2020-10-16 DIAGNOSIS — G47.33 OSA ON CPAP: ICD-10-CM

## 2020-10-16 DIAGNOSIS — M15.9 PRIMARY OSTEOARTHRITIS INVOLVING MULTIPLE JOINTS: ICD-10-CM

## 2020-10-16 DIAGNOSIS — Z23 NEEDS FLU SHOT: ICD-10-CM

## 2020-10-16 DIAGNOSIS — E66.01 MORBID OBESITY WITH BMI OF 50.0-59.9, ADULT (HCC): ICD-10-CM

## 2020-10-16 DIAGNOSIS — Z99.89 OSA ON CPAP: ICD-10-CM

## 2020-10-16 DIAGNOSIS — I10 ESSENTIAL HYPERTENSION: ICD-10-CM

## 2020-10-16 DIAGNOSIS — I89.0 LYMPHEDEMA OF BOTH LOWER EXTREMITIES: Primary | ICD-10-CM

## 2020-10-16 DIAGNOSIS — I25.10 ASCVD (ARTERIOSCLEROTIC CARDIOVASCULAR DISEASE): ICD-10-CM

## 2020-10-16 DIAGNOSIS — N18.2 CKD (CHRONIC KIDNEY DISEASE) STAGE 2, GFR 60-89 ML/MIN: ICD-10-CM

## 2020-10-16 DIAGNOSIS — I73.9 PVD (PERIPHERAL VASCULAR DISEASE) (HCC): ICD-10-CM

## 2020-10-16 PROCEDURE — G8752 SYS BP LESS 140: HCPCS | Performed by: INTERNAL MEDICINE

## 2020-10-16 PROCEDURE — G0008 ADMIN INFLUENZA VIRUS VAC: HCPCS

## 2020-10-16 PROCEDURE — 3017F COLORECTAL CA SCREEN DOC REV: CPT | Performed by: INTERNAL MEDICINE

## 2020-10-16 PROCEDURE — G8417 CALC BMI ABV UP PARAM F/U: HCPCS | Performed by: INTERNAL MEDICINE

## 2020-10-16 PROCEDURE — G8754 DIAS BP LESS 90: HCPCS | Performed by: INTERNAL MEDICINE

## 2020-10-16 PROCEDURE — G8432 DEP SCR NOT DOC, RNG: HCPCS | Performed by: INTERNAL MEDICINE

## 2020-10-16 PROCEDURE — 1090F PRES/ABSN URINE INCON ASSESS: CPT | Performed by: INTERNAL MEDICINE

## 2020-10-16 PROCEDURE — 90694 VACC AIIV4 NO PRSRV 0.5ML IM: CPT

## 2020-10-16 PROCEDURE — G8536 NO DOC ELDER MAL SCRN: HCPCS | Performed by: INTERNAL MEDICINE

## 2020-10-16 PROCEDURE — G8427 DOCREV CUR MEDS BY ELIG CLIN: HCPCS | Performed by: INTERNAL MEDICINE

## 2020-10-16 PROCEDURE — 99214 OFFICE O/P EST MOD 30 MIN: CPT | Performed by: INTERNAL MEDICINE

## 2020-10-16 PROCEDURE — G8399 PT W/DXA RESULTS DOCUMENT: HCPCS | Performed by: INTERNAL MEDICINE

## 2020-10-16 PROCEDURE — 1101F PT FALLS ASSESS-DOCD LE1/YR: CPT | Performed by: INTERNAL MEDICINE

## 2020-10-16 RX ORDER — DICLOFENAC SODIUM 75 MG/1
TABLET, DELAYED RELEASE ORAL
Status: CANCELLED | OUTPATIENT
Start: 2020-10-16

## 2020-10-16 RX ORDER — TRAMADOL HYDROCHLORIDE 50 MG/1
50 TABLET ORAL
Qty: 30 TAB | Refills: 3 | Status: SHIPPED | OUTPATIENT
Start: 2020-10-16 | End: 2020-11-15

## 2020-10-16 RX ORDER — BUPROPION HYDROCHLORIDE 150 MG/1
150 TABLET, EXTENDED RELEASE ORAL 2 TIMES DAILY
Qty: 180 TAB | Refills: 3 | Status: SHIPPED | OUTPATIENT
Start: 2020-10-16 | End: 2021-11-15
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
## 2020-10-16 NOTE — PATIENT INSTRUCTIONS
Lymphedema: Care Instructions Your Care Instructions Lymphedema is fluid that builds up in the arms or legs. It is often caused by surgery to remove lymph nodes during cancer treatment, especially breast cancer surgery, which can cause fluid to build up in the arm. It can happen after radiation treatment to an area that involves lymph nodes. It also can be caused by a fractured bone or surgery to fix a fracture. And some medicines also can cause lymphedema. Some people get it for unknown reasons. Normally, lymph nodes trap bacteria and other substances as fluid flows through them. Then, the white cells in the body's defense, or immune, system can destroy the substances. But if there are few or no lymph nodesor if the lymph system in an arm or leg has been damagedfluid can build up in the affected arm or leg. You can take simple steps at home to help treat or prevent fluid buildup. Treatment may include raising the arm or leg to let gravity drain the fluid. You also can wear compression stockings or sleeves. Follow-up care is a key part of your treatment and safety. Be sure to make and go to all appointments, and call your doctor if you are having problems. It's also a good idea to know your test results and keep a list of the medicines you take. How can you care for yourself at home? · Wear a compression stocking or sleeve as your doctor suggests. It can help keep fluid from pooling in an arm or leg. Wear it during air travel. · Prop up the swollen arm or leg on a pillow anytime you sit or lie down. Try to keep it above the level of your heart. This will help reduce swelling. · Avoid crossing your legs if your legs are swollen. · Get some exercise on most days of the week. Increase the intensity of exercise slowly. Water aerobics can help reduce swelling by helping fluid move around. Wear your compression stocking or sleeve during exercise, but not during water exercise. · See a physical therapist. He or she can teach you how to do self-massage to help fluid move around. You also can learn what activities would be best for you. · Keep your feet clean and wear clean socks or stockings every day. Check your feet often for signs of infection, such as redness or heat. Do not walk barefoot. · If you have had lymph nodes removed from under your arm: 
? Do not have blood drawn from the arm on the side of the lymph node surgery. ? Do not allow a blood pressure cuff to be placed on that arm. If you are in the hospital, make sure your nurse and other hospital staff know of your condition. ? Wear gloves when gardening or doing other activities that may lead to cuts on your fingers or hands. · If you have had lymph nodes removed from your groin: 
? Bathe your feet daily in lukewarm, not hot, water. Use a mild soap that has a moisturizer, or use a moisturizer separately. ? Check your feet for blisters or cuts. ? Wear comfortable and supportive shoes that fit properly. ? Wear the correct size of panty hose and stockings. Avoid garters or knee-high or thigh-high stockings. · Ask your doctor how to treat any cuts, scratches, insect bites, or other injuries that may occur. · Use sunscreen and insect repellent when outdoors to protect your skin from sunburn and insect bites. · Wear medical alert jewelry that says you have lymphedema. You can buy these at most drugstores and on the Internet. When should you call for help? Call your doctor now or seek immediate medical care if: 
  · You have signs of infection, such as: 
? Increased pain, swelling, warmth, or redness. ? Red streaks leading from the area. ? Pus draining from the area. ? A fever. Watch closely for changes in your health, and be sure to contact your doctor if: 
  · You have new or worse symptoms from lymphedema.  
  · You do not get better as expected. Where can you learn more? Go to http://www.gray.com/ Enter V398 in the search box to learn more about \"Lymphedema: Care Instructions. \" Current as of: April 29, 2020               Content Version: 12.6 © 2416-8300 [employer]. Care instructions adapted under license by [employer] (which disclaims liability or warranty for this information). If you have questions about a medical condition or this instruction, always ask your healthcare professional. [place] [address] any warranty or liability for your use of this information.

## 2020-11-11 RX ORDER — SIMVASTATIN 20 MG/1
TABLET, FILM COATED ORAL
Qty: 90 TAB | Refills: 3 | Status: SHIPPED | OUTPATIENT
Start: 2020-11-11 | End: 2021-11-14

## 2020-11-24 ENCOUNTER — OFFICE VISIT (OUTPATIENT)
Dept: INTERNAL MEDICINE CLINIC | Age: 70
End: 2020-11-24
Payer: MEDICARE

## 2020-11-24 VITALS
OXYGEN SATURATION: 98 % | WEIGHT: 293 LBS | SYSTOLIC BLOOD PRESSURE: 136 MMHG | BODY MASS INDEX: 47.09 KG/M2 | HEART RATE: 68 BPM | TEMPERATURE: 96.7 F | HEIGHT: 66 IN | RESPIRATION RATE: 16 BRPM | DIASTOLIC BLOOD PRESSURE: 79 MMHG

## 2020-11-24 DIAGNOSIS — M15.9 PRIMARY OSTEOARTHRITIS INVOLVING MULTIPLE JOINTS: ICD-10-CM

## 2020-11-24 DIAGNOSIS — R35.0 URINARY FREQUENCY: ICD-10-CM

## 2020-11-24 DIAGNOSIS — G47.33 OSA ON CPAP: ICD-10-CM

## 2020-11-24 DIAGNOSIS — I10 ESSENTIAL HYPERTENSION: ICD-10-CM

## 2020-11-24 DIAGNOSIS — Z00.00 MEDICARE ANNUAL WELLNESS VISIT, SUBSEQUENT: Primary | ICD-10-CM

## 2020-11-24 DIAGNOSIS — N18.2 CKD (CHRONIC KIDNEY DISEASE) STAGE 2, GFR 60-89 ML/MIN: ICD-10-CM

## 2020-11-24 DIAGNOSIS — E66.01 MORBID OBESITY WITH BMI OF 50.0-59.9, ADULT (HCC): ICD-10-CM

## 2020-11-24 DIAGNOSIS — Z99.89 OSA ON CPAP: ICD-10-CM

## 2020-11-24 DIAGNOSIS — I89.0 LYMPHEDEMA OF BOTH LOWER EXTREMITIES: ICD-10-CM

## 2020-11-24 DIAGNOSIS — R73.9 HYPERGLYCEMIA: ICD-10-CM

## 2020-11-24 DIAGNOSIS — Z13.5 SCREENING FOR GLAUCOMA: ICD-10-CM

## 2020-11-24 DIAGNOSIS — Z12.31 VISIT FOR SCREENING MAMMOGRAM: ICD-10-CM

## 2020-11-24 DIAGNOSIS — M81.0 POSTMENOPAUSAL BONE LOSS: ICD-10-CM

## 2020-11-24 PROCEDURE — G8752 SYS BP LESS 140: HCPCS | Performed by: INTERNAL MEDICINE

## 2020-11-24 PROCEDURE — 1090F PRES/ABSN URINE INCON ASSESS: CPT | Performed by: INTERNAL MEDICINE

## 2020-11-24 PROCEDURE — G8427 DOCREV CUR MEDS BY ELIG CLIN: HCPCS | Performed by: INTERNAL MEDICINE

## 2020-11-24 PROCEDURE — G0439 PPPS, SUBSEQ VISIT: HCPCS | Performed by: INTERNAL MEDICINE

## 2020-11-24 PROCEDURE — G8510 SCR DEP NEG, NO PLAN REQD: HCPCS | Performed by: INTERNAL MEDICINE

## 2020-11-24 PROCEDURE — 1101F PT FALLS ASSESS-DOCD LE1/YR: CPT | Performed by: INTERNAL MEDICINE

## 2020-11-24 PROCEDURE — G8417 CALC BMI ABV UP PARAM F/U: HCPCS | Performed by: INTERNAL MEDICINE

## 2020-11-24 PROCEDURE — G9899 SCRN MAM PERF RSLTS DOC: HCPCS | Performed by: INTERNAL MEDICINE

## 2020-11-24 PROCEDURE — G8754 DIAS BP LESS 90: HCPCS | Performed by: INTERNAL MEDICINE

## 2020-11-24 PROCEDURE — 99213 OFFICE O/P EST LOW 20 MIN: CPT | Performed by: INTERNAL MEDICINE

## 2020-11-24 PROCEDURE — G8536 NO DOC ELDER MAL SCRN: HCPCS | Performed by: INTERNAL MEDICINE

## 2020-11-24 PROCEDURE — 3017F COLORECTAL CA SCREEN DOC REV: CPT | Performed by: INTERNAL MEDICINE

## 2020-11-24 RX ORDER — ZOSTER VACCINE RECOMBINANT, ADJUVANTED 50 MCG/0.5
0.5 KIT INTRAMUSCULAR ONCE
Qty: 0.5 ML | Refills: 1 | Status: SHIPPED | OUTPATIENT
Start: 2020-11-24 | End: 2020-11-24

## 2020-11-24 NOTE — PATIENT INSTRUCTIONS
Medicare Wellness Visit, Female The best way to live healthy is to have a lifestyle where you eat a well-balanced diet, exercise regularly, limit alcohol use, and quit all forms of tobacco/nicotine, if applicable. Regular preventive services are another way to keep healthy. Preventive services (vaccines, screening tests, monitoring & exams) can help personalize your care plan, which helps you manage your own care. Screening tests can find health problems at the earliest stages, when they are easiest to treat. [address] follows the current, evidence-based guidelines published by the [facility] ([facility]) when recommending preventive services for our patients. Because we follow these guidelines, sometimes recommendations change over time as research supports it. (For example, mammograms used to be recommended annually. Even though Medicare will still pay for an annual mammogram, the newer guidelines recommend a mammogram every two years for women of average risk.) Of course, you and your doctor may decide to screen more often for some diseases, based on your risk and your health status. Preventive services for you include: - Medicare offers their members a free annual wellness visit, which is time for you and your primary care provider to discuss and plan for your preventive service needs. Take advantage of this benefit every year! 
-All adults over the age of 72 should receive the recommended pneumonia vaccines. Current USPSTF guidelines recommend a series of two vaccines for the best pneumonia protection.  
-All adults should have a flu vaccine yearly and a tetanus vaccine every 10 years. All adults age 48 and older should receive a shingles vaccine once in their lifetime.   
-A bone mass density test is recommended when a woman turns 65 to screen for osteoporosis. This test is only recommended one time, as a screening. Some providers will use this same test as a disease monitoring tool if you already have osteoporosis. -All adults age 38-68 who are overweight should have a diabetes screening test once every three years.  
-Other screening tests and preventive services for persons with diabetes include: an eye exam to screen for diabetic retinopathy, a kidney function test, a foot exam, and stricter control over your cholesterol.  
-Cardiovascular screening for adults with routine risk involves an electrocardiogram (ECG) at intervals determined by your doctor.  
-Colorectal cancer screenings should be done for adults age 54-65 with no increased risk factors for colorectal cancer. There are a number of acceptable methods of screening for this type of cancer. Each test has its own benefits and drawbacks. Discuss with your doctor what is most appropriate for you during your annual wellness visit. The different tests include: colonoscopy (considered the best screening method), a fecal occult blood test, a fecal DNA test, and sigmoidoscopy. -Breast cancer screenings are recommended every other year for women of normal risk, age 54-69. 
-Cervical cancer screenings for women over age 72 are only recommended with certain risk factors.  
-All adults born between [facility] should be screened once for Hepatitis C. Here is a list of your current Health Maintenance items (your personalized list of preventive services) with a due date: 
Health Maintenance Due Topic Date Due  Shingles Vaccine (1 of 2) 02/09/1999  Colorectal Screening  02/09/1999  Glaucoma Screening   02/09/2014  Mammogram  01/11/2019  Hemoglobin A1C    11/18/2020 [facility] Annual Well Visit  11/18/2020  Cholesterol Test   11/18/2020 Medicare Wellness Visit, Female The best way to live healthy is to have a lifestyle where you eat a well-balanced diet, exercise regularly, limit alcohol use, and quit all forms of tobacco/nicotine, if applicable. Regular preventive services are another way to keep healthy. Preventive services (vaccines, screening tests, monitoring & exams) can help personalize your care plan, which helps you manage your own care. Screening tests can find health problems at the earliest stages, when they are easiest to treat. [facility] follows the current, evidence-based guidelines published by the [facility] Hawkins ([facility]) when recommending preventive services for our patients. Because we follow these guidelines, sometimes recommendations change over time as research supports it. (For example, mammograms used to be recommended annually. Even though Medicare will still pay for an annual mammogram, the newer guidelines recommend a mammogram every two years for women of average risk). Of course, you and your doctor may decide to screen more often for some diseases, based on your risk and your co-morbidities (chronic disease you are already diagnosed with). Preventive services for you include: - Medicare offers their members a free annual wellness visit, which is time for you and your primary care provider to discuss and plan for your preventive service needs. Take advantage of this benefit every year! 
-All adults over the age of 72 should receive the recommended pneumonia vaccines. Current USPSTF guidelines recommend a series of two vaccines for the best pneumonia protection.  
-All adults should have a flu vaccine yearly and a tetanus vaccine every 10 years.  
-All adults age 48 and older should receive the shingles vaccines (series of two vaccines).      
-All adults age 38-68 who are overweight should have a diabetes screening test once every three years.  
-All adults born between [birth year] and 1966 should be screened once for Hepatitis C. 
-Other screening tests and preventive services for persons with diabetes include: an eye exam to screen for diabetic retinopathy, a kidney function test, a foot exam, and stricter control over your cholesterol.  
-Cardiovascular screening for adults with routine risk involves an electrocardiogram (ECG) at intervals determined by your doctor.  
-Colorectal cancer screenings should be done for adults age 54-65 with no increased risk factors for colorectal cancer. There are a number of acceptable methods of screening for this type of cancer. Each test has its own benefits and drawbacks. Discuss with your doctor what is most appropriate for you during your annual wellness visit. The different tests include: colonoscopy (considered the best screening method), a fecal occult blood test, a fecal DNA test, and sigmoidoscopy. 
 
-A bone mass density test is recommended when a woman turns 65 to screen for osteoporosis. This test is only recommended one time, as a screening. Some providers will use this same test as a disease monitoring tool if you already have osteoporosis. -Breast cancer screenings are recommended every other year for women of normal risk, age 54-69. 
-Cervical cancer screenings for women over age 72 are only recommended with certain risk factors. Here is a list of your current Health Maintenance items (your personalized list of preventive services) with a due date: 
Health Maintenance Due Topic Date Due  Shingles Vaccine (1 of 2) 02/09/1999  Colorectal Screening  02/09/1999  Glaucoma Screening   02/09/2014  Mammogram  01/11/2019  Hemoglobin A1C    11/18/2020 [facility] Annual Well Visit  11/18/2020  Cholesterol Test   11/18/2020
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
## 2020-11-25 LAB
ALBUMIN SERPL-MCNC: 4.2 G/DL (ref 3.7–4.7)
ALBUMIN/GLOB SERPL: 1.4 {RATIO} (ref 1.2–2.2)
ALP SERPL-CCNC: 105 IU/L (ref 39–117)
ALT SERPL-CCNC: 15 IU/L (ref 0–32)
APPEARANCE UR: CLEAR
AST SERPL-CCNC: 18 IU/L (ref 0–40)
BACTERIA #/AREA URNS HPF: ABNORMAL /[HPF]
BASOPHILS # BLD AUTO: 0.1 X10E3/UL (ref 0–0.2)
BASOPHILS NFR BLD AUTO: 1 %
BILIRUB SERPL-MCNC: 0.3 MG/DL (ref 0–1.2)
BILIRUB UR QL STRIP: NEGATIVE
BUN SERPL-MCNC: 29 MG/DL (ref 8–27)
BUN/CREAT SERPL: 22 (ref 12–28)
CALCIUM SERPL-MCNC: 11 MG/DL (ref 8.7–10.3)
CASTS URNS QL MICRO: ABNORMAL /LPF
CHLORIDE SERPL-SCNC: 104 MMOL/L (ref 96–106)
CHOLEST SERPL-MCNC: 195 MG/DL (ref 100–199)
CO2 SERPL-SCNC: 25 MMOL/L (ref 20–29)
COLOR UR: YELLOW
CREAT SERPL-MCNC: 1.32 MG/DL (ref 0.57–1)
CRYSTALS URNS MICRO: ABNORMAL
EOSINOPHIL # BLD AUTO: 0.1 X10E3/UL (ref 0–0.4)
EOSINOPHIL NFR BLD AUTO: 1 %
EPI CELLS #/AREA URNS HPF: ABNORMAL /HPF (ref 0–10)
ERYTHROCYTE [DISTWIDTH] IN BLOOD BY AUTOMATED COUNT: 12.4 % (ref 11.7–15.4)
EST. AVERAGE GLUCOSE BLD GHB EST-MCNC: 114 MG/DL
GLOBULIN SER CALC-MCNC: 2.9 G/DL (ref 1.5–4.5)
GLUCOSE SERPL-MCNC: 98 MG/DL (ref 65–99)
GLUCOSE UR QL: NEGATIVE
HBA1C MFR BLD: 5.6 % (ref 4.8–5.6)
HCT VFR BLD AUTO: 42.2 % (ref 34–46.6)
HDLC SERPL-MCNC: 79 MG/DL
HGB BLD-MCNC: 14.2 G/DL (ref 11.1–15.9)
HGB UR QL STRIP: NEGATIVE
IMM GRANULOCYTES # BLD AUTO: 0 X10E3/UL (ref 0–0.1)
IMM GRANULOCYTES NFR BLD AUTO: 1 %
KETONES UR QL STRIP: NEGATIVE
LDLC SERPL CALC-MCNC: 88 MG/DL (ref 0–99)
LEUKOCYTE ESTERASE UR QL STRIP: NEGATIVE
LYMPHOCYTES # BLD AUTO: 1.8 X10E3/UL (ref 0.7–3.1)
LYMPHOCYTES NFR BLD AUTO: 23 %
MCH RBC QN AUTO: 30.5 PG (ref 26.6–33)
MCHC RBC AUTO-ENTMCNC: 33.6 G/DL (ref 31.5–35.7)
MCV RBC AUTO: 91 FL (ref 79–97)
MICRO URNS: NORMAL
MICRO URNS: NORMAL
MONOCYTES # BLD AUTO: 0.8 X10E3/UL (ref 0.1–0.9)
MONOCYTES NFR BLD AUTO: 11 %
MUCOUS THREADS URNS QL MICRO: PRESENT
NEUTROPHILS # BLD AUTO: 4.8 X10E3/UL (ref 1.4–7)
NEUTROPHILS NFR BLD AUTO: 63 %
NITRITE UR QL STRIP: NEGATIVE
PH UR STRIP: 6.5 [PH] (ref 5–7.5)
PLATELET # BLD AUTO: 264 X10E3/UL (ref 150–450)
POTASSIUM SERPL-SCNC: 5.5 MMOL/L (ref 3.5–5.2)
PROT SERPL-MCNC: 7.1 G/DL (ref 6–8.5)
PROT UR QL STRIP: NEGATIVE
RBC # BLD AUTO: 4.65 X10E6/UL (ref 3.77–5.28)
RBC #/AREA URNS HPF: ABNORMAL /HPF (ref 0–2)
SODIUM SERPL-SCNC: 145 MMOL/L (ref 134–144)
SP GR UR: 1.02 (ref 1–1.03)
TRIGL SERPL-MCNC: 166 MG/DL (ref 0–149)
TSH SERPL DL<=0.005 MIU/L-ACNC: 2.61 UIU/ML (ref 0.45–4.5)
UNIDENT CRYS URNS QL MICRO: PRESENT
URINALYSIS REFLEX, 377202: NORMAL
UROBILINOGEN UR STRIP-MCNC: 0.2 MG/DL (ref 0.2–1)
VLDLC SERPL CALC-MCNC: 28 MG/DL (ref 5–40)
WBC # BLD AUTO: 7.5 X10E3/UL (ref 3.4–10.8)
WBC #/AREA URNS HPF: ABNORMAL /HPF (ref 0–5)

## 2020-12-28 ENCOUNTER — TELEPHONE (OUTPATIENT)
Dept: INTERNAL MEDICINE CLINIC | Age: 70
End: 2020-12-28

## 2020-12-28 NOTE — TELEPHONE ENCOUNTER
Called, spoke to pt. Two identifiers confirmed. Pt c/u frequent urination and noticed blood in urine one time since 12/25. Notified pt I would send message to on call MD.   Pt verbalized understanding of information discussed w/ no further questions at this time.

## 2020-12-28 NOTE — TELEPHONE ENCOUNTER
Patient called the office stating that she has been experiencing symptoms of a UTI since Friday. Patient is requesting a prescription for an antibiotic to be sent to her pharmacy.

## 2020-12-28 NOTE — TELEPHONE ENCOUNTER
Danae Prakash MD  You 8 minutes ago (3:50 PM)     Add her onto tomorrow morning    Message text      VV scheduled for 12/29 @ 10 with Dr. Nadir Barrera. Pt verbalized understanding of information discussed w/ no further questions at this time.

## 2020-12-28 NOTE — TELEPHONE ENCOUNTER
----- Message from Paul Mejia sent at 12/28/2020  1:51 PM EST -----  Regarding: Dr. Chowdhury Hedge: [phone]  Patient return call    Caller's first and last name and relationship (if not the patient): Pt      Best contact number(s):[phone]      Whose call is being returned: \"Rosalia\"      Details to clarify the request: I just missed a called from [facility] no message except pt to call her back      Message from [facility]

## 2020-12-29 ENCOUNTER — VIRTUAL VISIT (OUTPATIENT)
Dept: INTERNAL MEDICINE CLINIC | Age: 70
End: 2020-12-29
Payer: MEDICARE

## 2020-12-29 DIAGNOSIS — N30.00 ACUTE CYSTITIS WITHOUT HEMATURIA: Primary | ICD-10-CM

## 2020-12-29 PROCEDURE — 1090F PRES/ABSN URINE INCON ASSESS: CPT | Performed by: INTERNAL MEDICINE

## 2020-12-29 PROCEDURE — 1101F PT FALLS ASSESS-DOCD LE1/YR: CPT | Performed by: INTERNAL MEDICINE

## 2020-12-29 PROCEDURE — G8399 PT W/DXA RESULTS DOCUMENT: HCPCS | Performed by: INTERNAL MEDICINE

## 2020-12-29 PROCEDURE — G9899 SCRN MAM PERF RSLTS DOC: HCPCS | Performed by: INTERNAL MEDICINE

## 2020-12-29 PROCEDURE — G8427 DOCREV CUR MEDS BY ELIG CLIN: HCPCS | Performed by: INTERNAL MEDICINE

## 2020-12-29 PROCEDURE — G8756 NO BP MEASURE DOC: HCPCS | Performed by: INTERNAL MEDICINE

## 2020-12-29 PROCEDURE — 3017F COLORECTAL CA SCREEN DOC REV: CPT | Performed by: INTERNAL MEDICINE

## 2020-12-29 PROCEDURE — G8432 DEP SCR NOT DOC, RNG: HCPCS | Performed by: INTERNAL MEDICINE

## 2020-12-29 PROCEDURE — 99213 OFFICE O/P EST LOW 20 MIN: CPT | Performed by: INTERNAL MEDICINE

## 2020-12-29 RX ORDER — CIPROFLOXACIN 250 MG/1
250 TABLET, FILM COATED ORAL 2 TIMES DAILY
Qty: 6 TAB | Refills: 0 | Status: SHIPPED | OUTPATIENT
Start: 2020-12-29 | End: 2021-01-01

## 2020-12-29 NOTE — PROGRESS NOTES
HISTORY OF PRESENT ILLNESS  Marcus Taylor is a 69 y.o. female. HPI     Pt normally follows with Dr Prince Waldrop. Pt is here for acute care.  This is an established visit completed with telemedicine was completed with video assist  the patient acknowledges and agrees to this method of visitation    Cell phone: [phone]    She c/o uti x 12/25/20  She endorses dysuria sometimes with urination, occasional hematuria (light pink spotting)  She thinks this has gotten better, less burning and blood, none at all yesterday   Denies nausea, vomiting, low back pain  Has not taken anything for this, has been trying to drink more water the last few days   Will give cipro 250 mg BID for possible uti  Will get urine sample if sxs do not improve     Reviewed last labs and meds  Cr 1.3, 1.1 last year     Patient Active Problem List    Diagnosis Date Noted    Pleural effusion on right 04/29/2019     Priority: 1 - One    Lymphedema of both lower extremities 11/06/2018     Priority: 2 - Two    Stasis dermatitis of both legs 10/02/2018     Priority: 2 - Two    LBBB (left bundle branch block) 11/08/2017     Priority: 3 - Three    MUMTAZ on CPAP 08/16/2017     Priority: 3 - Three    CKD (chronic kidney disease) stage 2, GFR 60-89 ml/min 08/16/2017     Priority: 3 - Three    Prediabetes 12/10/2019    Primary osteoarthritis involving multiple joints 11/15/2019    Olecranon bursitis of left elbow 06/03/2019    Essential hypertension     Retinal tear of right eye 08/08/2017    Bilateral cataracts 08/08/2017    Hematoma 08/08/2017    Hair loss 08/08/2017    Kidney stones 08/08/2017    Family history of colon cancer 08/08/2017    Edema extremities 08/08/2017    Morbid obesity with BMI of 50.0-59.9, adult ([address] 08/08/2017    OAB (overactive bladder) 08/08/2017    Vitamin D deficiency 08/08/2017    PAF (paroxysmal atrial fibrillation) ([facility] 08/08/2017    ASCVD (arteriosclerotic cardiovascular disease) 08/08/2017    Mixed hyperlipidemia 08/08/2017    Organic cardiac disease 08/08/2017    Gastroesophageal reflux disease without esophagitis 08/08/2017    Herpes zoster 08/08/2017     Current Outpatient Medications   Medication Sig Dispense Refill    ciprofloxacin HCl (CIPRO) 250 mg tablet Take 1 Tab by mouth two (2) times a day for 3 days. 6 Tab 0    simvastatin (ZOCOR) 20 mg tablet TAKE 1 TABLET BY MOUTH EVERY EVENING FOR CHOLESTEROL 90 Tab 3    buPROPion SR (WELLBUTRIN SR) 150 mg SR tablet Take 1 Tab by mouth two (2) times a day. 180 Tab 3    bumetanide (BUMEX) 1 mg tablet Take 1 Tab by mouth daily. 90 Tab 3    lisinopril (PRINIVIL, ZESTRIL) 20 mg tablet Take 2 Tabs by mouth daily. 180 Tab 3    metoprolol tartrate (LOPRESSOR) 50 mg tablet TAKE 1 TABLET BY MOUTH TWICE DAILY 180 Tab 3    calcium carbonate-vitamin D3 (CALTRATE WITH VITAMIN D3) 600 mg(1,500mg) -800 unit tab Take 1 Tab by mouth daily.  cyanocobalamin (VITAMIN B-12) 500 mcg tablet Take 500 mcg by mouth daily.  multivitamin (ONE A DAY) tablet Take 2 Tabs by mouth daily.  aspirin delayed-release 81 mg tablet Take 81 mg by mouth daily.        Past Surgical History:   Procedure Laterality Date    COLORECTAL SCRN; HI RISK IND  8/11/2014         DILATE ESOPHAGUS  8/11/2014         HX APPENDECTOMY      HX HYSTERECTOMY      HX ORTHOPAEDIC Right     thigh/hip hematoma-4 times    HX OTHER SURGICAL      lap sleeve gastrectomy    HX SALPINGO-OOPHORECTOMY      HX UROLOGICAL  3/6/14    CYSTOSCOPY, RIGHT URETEROSCOPY WITH HIGH POWERED HOLMIUM LASER, WITH RIGHT URETERAL STENT PLACEMENT     [place] EGD TRANSORAL BIOPSY SINGLE/MULTIPLE  8/11/2014           Lab Results   Component Value Date/Time    WBC 7.5 11/24/2020 12:15 PM    WBC (POC) 7.0 09/28/2018 12:00 PM    HGB 14.2 11/24/2020 12:15 PM    HGB (POC) 13.0 09/28/2018 12:00 PM    HCT 42.2 11/24/2020 12:15 PM    HCT (POC) 39.6 09/28/2018 12:00 PM    PLATELET 573 34/38/7384 12:15 PM    PLATELET (POC) 019.8 09/28/2018 12:00 PM    MCV 91 11/24/2020 12:15 PM    MCV (POC) 91.0 09/28/2018 12:00 PM     Lab Results   Component Value Date/Time    Cholesterol, total 195 11/24/2020 12:15 PM    Cholesterol (POC) 151.0 09/28/2018 12:00 PM    HDL Cholesterol 79 11/24/2020 12:15 PM    LDL, calculated 88 11/24/2020 12:15 PM    LDL, calculated 70 11/18/2019 02:40 PM    LDL Cholesterol (POC) 52.6 09/28/2018 12:00 PM    Triglyceride 166 (H) 11/24/2020 12:15 PM    Triglycerides (POC) 157.0 09/28/2018 12:00 PM    CHOL/HDL Ratio 2 11/18/2019 02:40 PM     Lab Results   Component Value Date/Time    GFR est non-AA 41 (L) 11/24/2020 12:15 PM    GFRNA, POC 50 (L) 05/29/2013 11:00 AM    GFR est AA 47 (L) 11/24/2020 12:15 PM    GFRAA, POC >60 05/29/2013 11:00 AM    Creatinine 1.32 (H) 11/24/2020 12:15 PM    Creatinine (POC) 0.9 12/06/2018 02:53 PM    Creatinine (POC) 1.1 05/29/2013 11:00 AM    BUN 29 (H) 11/24/2020 12:15 PM    BUN 37.0 (A) 12/06/2018 02:53 PM    Sodium 145 (H) 11/24/2020 12:15 PM    Sodium (POC) 143.0 12/06/2018 02:53 PM    Potassium 5.5 (H) 11/24/2020 12:15 PM    Potassium (POC) 5.1 (A) 12/06/2018 02:53 PM    Chloride 104 11/24/2020 12:15 PM    CHLORIDE 105.0 12/06/2018 02:53 PM    CO2 25 11/24/2020 12:15 PM    CO2 32.0 12/06/2018 02:53 PM        Review of Systems   Constitutional: Negative for chills and fever. Respiratory: Negative for shortness of breath. Cardiovascular: Negative for chest pain. Genitourinary: Positive for dysuria and hematuria. Negative for flank pain. Physical Exam  Neurological:      Mental Status: She is alert and oriented to person, place, and time. Psychiatric:         Mood and Affect: Mood normal.         ASSESSMENT and PLAN    ICD-10-CM ICD-9-CM    1. Acute cystitis without hematuria       Will treat w cipro bid    Will get urine studies if not improving     N30.00 595.0            Scribed by Clark Goins, as dictated by Dr. Leatha Carranza.      Current diagnosis and concerns discussed with pt at length. Pt understands risks and benefits or current treatment plan and medications, and accepts the treatment and medication with any possible risks. Pt asks appropriate questions, which were answered. Pt was instructed to call with any concerns or problems. I have reviewed the note documented by the scribe. The services provided are my own. The documentation is accurate     Charles Lambert, who was evaluated through a synchronous (real-time) audio videoencounter, and/or her healthcare decision maker, is aware that it is a billable service, with coverage as determined by her insurance carrier. She provided verbal consent to proceed: Yes, and patient identification was verified. It was conducted pursuant to the emergency declaration under the [address] [place] [address] authority and the [employer] Act. A caregiver was present when appropriate. Ability to conduct physical exam was limited. I was at home. The patient was at home.

## 2021-02-10 RX ORDER — LISINOPRIL 20 MG/1
40 TABLET ORAL DAILY
Qty: 180 TAB | Refills: 3 | Status: SHIPPED | OUTPATIENT
Start: 2021-02-10 | End: 2022-02-21

## 2021-02-10 RX ORDER — METOPROLOL TARTRATE 50 MG/1
TABLET ORAL
Qty: 180 TAB | Refills: 3 | Status: SHIPPED | OUTPATIENT
Start: 2021-02-10 | End: 2022-02-21

## 2021-02-10 NOTE — TELEPHONE ENCOUNTER
Future Appointments:  No future appointments. Last Appointment With Me:  11/24/2020     Requested Prescriptions     Pending Prescriptions Disp Refills    lisinopriL (PRINIVIL, ZESTRIL) 20 mg tablet 180 Tab 3     Sig: Take 2 Tabs by mouth daily.     metoprolol tartrate (LOPRESSOR) 50 mg tablet 180 Tab 3     Sig: TAKE 1 TABLET BY MOUTH TWICE DAILY

## 2021-09-27 ENCOUNTER — TELEPHONE (OUTPATIENT)
Dept: INTERNAL MEDICINE CLINIC | Age: 71
End: 2021-09-27

## 2021-09-27 NOTE — TELEPHONE ENCOUNTER
----- Message from [facility] sent at 9/27/2021  1:15 PM EDT -----  Regarding: /Telephone  Contact: [phone]  General Message/Vendor Calls    Caller's first and last name: Ji Common      Reason for call: needs to get antibiotics for uti       Callback required yes/no and why: yes to confirm      Best contact number(s):[phone]      Details to clarify the request: send to 's nurse      Message from [facility]

## 2021-09-28 ENCOUNTER — DOCUMENTATION ONLY (OUTPATIENT)
Dept: INTERNAL MEDICINE CLINIC | Age: 71
End: 2021-09-28

## 2021-09-28 NOTE — TELEPHONE ENCOUNTER
Patient wants a urine test ordered and sent to [place] [facility] Fax # [phone]. Patient plans to go tomorrow morning. Thanks.

## 2021-09-29 DIAGNOSIS — R30.0 DYSURIA: Primary | ICD-10-CM

## 2021-09-29 NOTE — TELEPHONE ENCOUNTER
Patient did not want to schedule nurse visit for urine testing. Patient is requesting lab orders to be sent to [facility] for testing.

## 2021-10-25 ENCOUNTER — TELEPHONE (OUTPATIENT)
Dept: INTERNAL MEDICINE CLINIC | Age: 71
End: 2021-10-25

## 2021-10-25 NOTE — TELEPHONE ENCOUNTER
I wouldn't have the time to address the Nurse Visit today if it has to be me to do it. Patient can come in at 7:45 am tomorrow or 1:30 pm tomorrow.

## 2021-10-25 NOTE — TELEPHONE ENCOUNTER
#[phone]  Pt states she has a uti or believes so and would like to bring in or give a urine sample to have tested. Please call to let her know if this is a visit or a nurse visit?      Please call pt back yet today

## 2021-10-26 ENCOUNTER — CLINICAL SUPPORT (OUTPATIENT)
Dept: INTERNAL MEDICINE CLINIC | Age: 71
End: 2021-10-26
Payer: MEDICARE

## 2021-10-26 DIAGNOSIS — R35.89 POLYURIA: Primary | ICD-10-CM

## 2021-10-26 LAB
BILIRUB UR QL STRIP: NEGATIVE
GLUCOSE UR-MCNC: NEGATIVE MG/DL
KETONES P FAST UR STRIP-MCNC: NEGATIVE MG/DL
PH UR STRIP: 7 [PH] (ref 4.6–8)
PROT UR QL STRIP: NEGATIVE
SP GR UR STRIP: 1.02 (ref 1–1.03)
UA UROBILINOGEN AMB POC: NORMAL (ref 0.2–1)
URINALYSIS CLARITY POC: NORMAL
URINALYSIS COLOR POC: YELLOW
URINE BLOOD POC: NEGATIVE
URINE LEUKOCYTES POC: NEGATIVE
URINE NITRITES POC: NEGATIVE

## 2021-10-26 PROCEDURE — 81003 URINALYSIS AUTO W/O SCOPE: CPT | Performed by: INTERNAL MEDICINE

## 2021-10-26 NOTE — PROGRESS NOTES
Patient thought she may possibly have a UTI, after UA patient does not have UTI. Dr. Florin Gonzalez said she needs to drink more fluids as she may be dehydrated.

## 2021-10-26 NOTE — TELEPHONE ENCOUNTER
Patient came in for urinalysis today. 10/26/2021. .. Dr. Dario Ramirez reviewed results and said patient may just be dehydrated but her UA looks good. Called and left message for patient to return call or I will try to call back with results.

## 2021-10-26 NOTE — TELEPHONE ENCOUNTER
Attempting to call patient one more time before leaving for the day. Have already left vm for patient to return call. Once again left message for patient to return call. I am unable to leave detailed vm because it is not a personalized vm recording. Patient UA came back looking clear of any issues or infection. Patient may be a bit dehydrated, Dr. Alberto Thomas recommends drink plenty of fluids.

## 2021-10-27 ENCOUNTER — TELEPHONE (OUTPATIENT)
Dept: INTERNAL MEDICINE CLINIC | Age: 71
End: 2021-10-27

## 2021-10-27 NOTE — TELEPHONE ENCOUNTER
----- Message from Elmira Soto sent at 10/27/2021 10:48 AM EDT -----  Subject: Message to Provider    QUESTIONS  Information for Provider? Pt is returning Alejandra's phone call from   yesterday.   ---------------------------------------------------------------------------  --------------  [address]  What is the best way for the office to contact you? OK to leave message on   voicemail  Preferred Call Back Phone Number?  [phone]  ---------------------------------------------------------------------------  --------------  SCRIPT ANSWERS  undefined

## 2021-10-27 NOTE — TELEPHONE ENCOUNTER
Called patient and let her know her urine test was normal and that per Dr. Dolores Carr she needs to drink more fluids and stay hydrated. Patient expressed understanding of this information and had no further questions at this time.

## 2021-11-14 RX ORDER — SIMVASTATIN 20 MG/1
TABLET, FILM COATED ORAL
Qty: 90 TABLET | Refills: 3 | Status: SHIPPED | OUTPATIENT
Start: 2021-11-14

## 2021-11-15 RX ORDER — BUPROPION HYDROCHLORIDE 150 MG/1
TABLET, EXTENDED RELEASE ORAL
Qty: 180 TABLET | Refills: 3 | Status: SHIPPED | OUTPATIENT
Start: 2021-11-15

## 2021-12-02 ENCOUNTER — OFFICE VISIT (OUTPATIENT)
Dept: INTERNAL MEDICINE CLINIC | Age: 71
End: 2021-12-02
Payer: MEDICARE

## 2021-12-02 VITALS
SYSTOLIC BLOOD PRESSURE: 164 MMHG | WEIGHT: 293 LBS | TEMPERATURE: 97.2 F | RESPIRATION RATE: 14 BRPM | BODY MASS INDEX: 47.09 KG/M2 | HEIGHT: 66 IN | HEART RATE: 69 BPM | OXYGEN SATURATION: 96 % | DIASTOLIC BLOOD PRESSURE: 79 MMHG

## 2021-12-02 DIAGNOSIS — I48.0 PAF (PAROXYSMAL ATRIAL FIBRILLATION) (HCC): ICD-10-CM

## 2021-12-02 DIAGNOSIS — M17.0 PRIMARY OSTEOARTHRITIS OF KNEES, BILATERAL: ICD-10-CM

## 2021-12-02 DIAGNOSIS — Z00.00 MEDICARE ANNUAL WELLNESS VISIT, SUBSEQUENT: Primary | ICD-10-CM

## 2021-12-02 DIAGNOSIS — G47.33 OSA ON CPAP: ICD-10-CM

## 2021-12-02 DIAGNOSIS — I89.0 LYMPHEDEMA OF BOTH LOWER EXTREMITIES: ICD-10-CM

## 2021-12-02 DIAGNOSIS — Z99.89 OSA ON CPAP: ICD-10-CM

## 2021-12-02 DIAGNOSIS — E66.01 MORBID OBESITY WITH BMI OF 50.0-59.9, ADULT (HCC): ICD-10-CM

## 2021-12-02 DIAGNOSIS — Z23 NEEDS FLU SHOT: ICD-10-CM

## 2021-12-02 DIAGNOSIS — M81.0 POSTMENOPAUSAL BONE LOSS: ICD-10-CM

## 2021-12-02 DIAGNOSIS — N18.2 CKD (CHRONIC KIDNEY DISEASE) STAGE 2, GFR 60-89 ML/MIN: ICD-10-CM

## 2021-12-02 DIAGNOSIS — R73.03 PREDIABETES: ICD-10-CM

## 2021-12-02 PROCEDURE — 1090F PRES/ABSN URINE INCON ASSESS: CPT | Performed by: INTERNAL MEDICINE

## 2021-12-02 PROCEDURE — G8427 DOCREV CUR MEDS BY ELIG CLIN: HCPCS | Performed by: INTERNAL MEDICINE

## 2021-12-02 PROCEDURE — G8536 NO DOC ELDER MAL SCRN: HCPCS | Performed by: INTERNAL MEDICINE

## 2021-12-02 PROCEDURE — G9899 SCRN MAM PERF RSLTS DOC: HCPCS | Performed by: INTERNAL MEDICINE

## 2021-12-02 PROCEDURE — 1101F PT FALLS ASSESS-DOCD LE1/YR: CPT | Performed by: INTERNAL MEDICINE

## 2021-12-02 PROCEDURE — G8753 SYS BP > OR = 140: HCPCS | Performed by: INTERNAL MEDICINE

## 2021-12-02 PROCEDURE — G0439 PPPS, SUBSEQ VISIT: HCPCS | Performed by: INTERNAL MEDICINE

## 2021-12-02 PROCEDURE — G8417 CALC BMI ABV UP PARAM F/U: HCPCS | Performed by: INTERNAL MEDICINE

## 2021-12-02 PROCEDURE — G0008 ADMIN INFLUENZA VIRUS VAC: HCPCS | Performed by: INTERNAL MEDICINE

## 2021-12-02 PROCEDURE — G8432 DEP SCR NOT DOC, RNG: HCPCS | Performed by: INTERNAL MEDICINE

## 2021-12-02 PROCEDURE — G8754 DIAS BP LESS 90: HCPCS | Performed by: INTERNAL MEDICINE

## 2021-12-02 PROCEDURE — 90694 VACC AIIV4 NO PRSRV 0.5ML IM: CPT | Performed by: INTERNAL MEDICINE

## 2021-12-02 PROCEDURE — 3017F COLORECTAL CA SCREEN DOC REV: CPT | Performed by: INTERNAL MEDICINE

## 2021-12-02 PROCEDURE — 99214 OFFICE O/P EST MOD 30 MIN: CPT | Performed by: INTERNAL MEDICINE

## 2021-12-02 RX ORDER — HYDROCHLOROTHIAZIDE 12.5 MG/1
12.5 TABLET ORAL DAILY
Qty: 90 TABLET | Refills: 3 | Status: SHIPPED | OUTPATIENT
Start: 2021-12-02

## 2021-12-02 RX ORDER — ZOSTER VACCINE RECOMBINANT, ADJUVANTED 50 MCG/0.5
0.5 KIT INTRAMUSCULAR ONCE
Qty: 0.5 ML | Refills: 1 | Status: SHIPPED | OUTPATIENT
Start: 2021-12-02 | End: 2021-12-02

## 2021-12-02 RX ORDER — DICLOFENAC SODIUM 10 MG/G
GEL TOPICAL
Qty: 100 G | Refills: 2 | Status: SHIPPED | OUTPATIENT
Start: 2021-12-02
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
## 2021-12-02 NOTE — PATIENT INSTRUCTIONS

## 2021-12-03 LAB
ALBUMIN SERPL-MCNC: 3.7 G/DL (ref 3.5–5)
ALBUMIN/GLOB SERPL: 1 {RATIO} (ref 1.1–2.2)
ALP SERPL-CCNC: 109 U/L (ref 45–117)
ALT SERPL-CCNC: 24 U/L (ref 12–78)
ANION GAP SERPL CALC-SCNC: 6 MMOL/L (ref 5–15)
AST SERPL-CCNC: 14 U/L (ref 15–37)
BASOPHILS # BLD: 0.1 K/UL (ref 0–0.1)
BASOPHILS NFR BLD: 1 % (ref 0–1)
BILIRUB SERPL-MCNC: 0.5 MG/DL (ref 0.2–1)
BUN SERPL-MCNC: 32 MG/DL (ref 6–20)
BUN/CREAT SERPL: 28 (ref 12–20)
CALCIUM SERPL-MCNC: 10 MG/DL (ref 8.5–10.1)
CHLORIDE SERPL-SCNC: 108 MMOL/L (ref 97–108)
CHOLEST SERPL-MCNC: 197 MG/DL
CO2 SERPL-SCNC: 28 MMOL/L (ref 21–32)
CREAT SERPL-MCNC: 1.14 MG/DL (ref 0.55–1.02)
DIFFERENTIAL METHOD BLD: ABNORMAL
EOSINOPHIL # BLD: 0.2 K/UL (ref 0–0.4)
EOSINOPHIL NFR BLD: 3 % (ref 0–7)
ERYTHROCYTE [DISTWIDTH] IN BLOOD BY AUTOMATED COUNT: 13.5 % (ref 11.5–14.5)
EST. AVERAGE GLUCOSE BLD GHB EST-MCNC: 105 MG/DL
GLOBULIN SER CALC-MCNC: 3.7 G/DL (ref 2–4)
GLUCOSE SERPL-MCNC: 96 MG/DL (ref 65–100)
HBA1C MFR BLD: 5.3 % (ref 4–5.6)
HCT VFR BLD AUTO: 44 % (ref 35–47)
HDLC SERPL-MCNC: 84 MG/DL
HDLC SERPL: 2.3 {RATIO} (ref 0–5)
HGB BLD-MCNC: 13.7 G/DL (ref 11.5–16)
IMM GRANULOCYTES # BLD AUTO: 0 K/UL (ref 0–0.04)
IMM GRANULOCYTES NFR BLD AUTO: 1 % (ref 0–0.5)
LDLC SERPL CALC-MCNC: 88.2 MG/DL (ref 0–100)
LYMPHOCYTES # BLD: 1.7 K/UL (ref 0.8–3.5)
LYMPHOCYTES NFR BLD: 22 % (ref 12–49)
MCH RBC QN AUTO: 30 PG (ref 26–34)
MCHC RBC AUTO-ENTMCNC: 31.1 G/DL (ref 30–36.5)
MCV RBC AUTO: 96.5 FL (ref 80–99)
MONOCYTES # BLD: 1 K/UL (ref 0–1)
MONOCYTES NFR BLD: 13 % (ref 5–13)
NEUTS SEG # BLD: 4.8 K/UL (ref 1.8–8)
NEUTS SEG NFR BLD: 60 % (ref 32–75)
NRBC # BLD: 0 K/UL (ref 0–0.01)
NRBC BLD-RTO: 0 PER 100 WBC
PLATELET # BLD AUTO: 255 K/UL (ref 150–400)
PMV BLD AUTO: 12.2 FL (ref 8.9–12.9)
POTASSIUM SERPL-SCNC: 4.7 MMOL/L (ref 3.5–5.1)
PROT SERPL-MCNC: 7.4 G/DL (ref 6.4–8.2)
RBC # BLD AUTO: 4.56 M/UL (ref 3.8–5.2)
SODIUM SERPL-SCNC: 142 MMOL/L (ref 136–145)
TRIGL SERPL-MCNC: 124 MG/DL (ref ?–150)
TSH SERPL DL<=0.05 MIU/L-ACNC: 2.04 UIU/ML (ref 0.36–3.74)
VLDLC SERPL CALC-MCNC: 24.8 MG/DL
WBC # BLD AUTO: 7.8 K/UL (ref 3.6–11)

## 2021-12-13 NOTE — PROGRESS NOTES
Letter mailed to patient. Labs look pretty good.  Continue same meds.  Hopefully addition of hctz will improved blood pressure.

## 2022-01-03 ENCOUNTER — NURSE TRIAGE (OUTPATIENT)
Dept: OTHER | Facility: CLINIC | Age: 72
End: 2022-01-03

## 2022-02-21 RX ORDER — LISINOPRIL 20 MG/1
TABLET ORAL
Qty: 180 TABLET | Refills: 3 | Status: SHIPPED | OUTPATIENT
Start: 2022-02-21

## 2022-02-21 RX ORDER — METOPROLOL TARTRATE 50 MG/1
TABLET ORAL
Qty: 180 TABLET | Refills: 3 | Status: SHIPPED | OUTPATIENT
Start: 2022-02-21

## 2022-03-18 PROBLEM — M70.22 OLECRANON BURSITIS OF LEFT ELBOW: Status: ACTIVE | Noted: 2019-06-03

## 2022-03-18 PROBLEM — B02.9 HERPES ZOSTER: Status: ACTIVE | Noted: 2017-08-08

## 2022-03-18 PROBLEM — N20.0 KIDNEY STONES: Status: ACTIVE | Noted: 2017-08-08

## 2022-03-18 PROBLEM — T14.8XXA HEMATOMA: Status: ACTIVE | Noted: 2017-08-08

## 2022-03-18 PROBLEM — K21.9 GASTROESOPHAGEAL REFLUX DISEASE WITHOUT ESOPHAGITIS: Status: ACTIVE | Noted: 2017-08-08

## 2022-03-18 PROBLEM — H33.311 RETINAL TEAR OF RIGHT EYE: Status: ACTIVE | Noted: 2017-08-08

## 2022-03-18 PROBLEM — N32.81 OAB (OVERACTIVE BLADDER): Status: ACTIVE | Noted: 2017-08-08

## 2022-03-19 PROBLEM — E55.9 VITAMIN D DEFICIENCY: Status: ACTIVE | Noted: 2017-08-08

## 2022-03-19 PROBLEM — E66.01 MORBID OBESITY WITH BMI OF 50.0-59.9, ADULT (HCC): Status: ACTIVE | Noted: 2017-08-08

## 2022-03-19 PROBLEM — I51.9 ORGANIC CARDIAC DISEASE: Status: ACTIVE | Noted: 2017-08-08

## 2022-03-19 PROBLEM — I25.10 ASCVD (ARTERIOSCLEROTIC CARDIOVASCULAR DISEASE): Status: ACTIVE | Noted: 2017-08-08

## 2022-03-19 PROBLEM — I48.0 PAF (PAROXYSMAL ATRIAL FIBRILLATION) (HCC): Status: ACTIVE | Noted: 2017-08-08

## 2022-03-19 PROBLEM — R73.03 PREDIABETES: Status: ACTIVE | Noted: 2019-12-10

## 2022-03-19 PROBLEM — E78.2 MIXED HYPERLIPIDEMIA: Status: ACTIVE | Noted: 2017-08-08

## 2022-03-19 PROBLEM — I87.2 STASIS DERMATITIS OF BOTH LEGS: Status: ACTIVE | Noted: 2018-10-02

## 2022-03-19 PROBLEM — H26.9 BILATERAL CATARACTS: Status: ACTIVE | Noted: 2017-08-08

## 2022-03-19 PROBLEM — N18.2 CKD (CHRONIC KIDNEY DISEASE) STAGE 2, GFR 60-89 ML/MIN: Status: ACTIVE | Noted: 2017-08-16

## 2022-03-19 PROBLEM — I89.0 LYMPHEDEMA OF BOTH LOWER EXTREMITIES: Status: ACTIVE | Noted: 2018-11-06

## 2022-03-19 PROBLEM — G47.33 OSA ON CPAP: Status: ACTIVE | Noted: 2017-08-16

## 2022-03-19 PROBLEM — Z80.0 FAMILY HISTORY OF COLON CANCER: Status: ACTIVE | Noted: 2017-08-08

## 2022-03-19 PROBLEM — Z99.89 OSA ON CPAP: Status: ACTIVE | Noted: 2017-08-16

## 2022-03-20 PROBLEM — I44.7 LBBB (LEFT BUNDLE BRANCH BLOCK): Status: ACTIVE | Noted: 2017-11-08

## 2022-03-20 PROBLEM — M15.9 PRIMARY OSTEOARTHRITIS INVOLVING MULTIPLE JOINTS: Status: ACTIVE | Noted: 2019-11-15

## 2022-03-20 PROBLEM — L65.9 HAIR LOSS: Status: ACTIVE | Noted: 2017-08-08

## 2022-03-20 PROBLEM — J90 PLEURAL EFFUSION ON RIGHT: Status: ACTIVE | Noted: 2019-04-29

## 2022-03-20 PROBLEM — M15.0 PRIMARY OSTEOARTHRITIS INVOLVING MULTIPLE JOINTS: Status: ACTIVE | Noted: 2019-11-15

## 2022-04-12 ENCOUNTER — OFFICE VISIT (OUTPATIENT)
Dept: INTERNAL MEDICINE CLINIC | Age: 72
End: 2022-04-12
Payer: MEDICARE

## 2022-04-12 VITALS
RESPIRATION RATE: 16 BRPM | SYSTOLIC BLOOD PRESSURE: 131 MMHG | HEIGHT: 66 IN | DIASTOLIC BLOOD PRESSURE: 62 MMHG | HEART RATE: 69 BPM | WEIGHT: 293 LBS | BODY MASS INDEX: 47.09 KG/M2 | TEMPERATURE: 97 F | OXYGEN SATURATION: 95 %

## 2022-04-12 DIAGNOSIS — K92.2 UGI BLEED: Primary | ICD-10-CM

## 2022-04-12 DIAGNOSIS — G47.33 OSA ON CPAP: ICD-10-CM

## 2022-04-12 DIAGNOSIS — Z99.89 OSA ON CPAP: ICD-10-CM

## 2022-04-12 DIAGNOSIS — I89.0 LYMPHEDEMA OF BOTH LOWER EXTREMITIES: ICD-10-CM

## 2022-04-12 DIAGNOSIS — I10 ESSENTIAL HYPERTENSION: ICD-10-CM

## 2022-04-12 DIAGNOSIS — I48.0 PAF (PAROXYSMAL ATRIAL FIBRILLATION) (HCC): ICD-10-CM

## 2022-04-12 DIAGNOSIS — E66.01 MORBID OBESITY WITH BMI OF 50.0-59.9, ADULT (HCC): ICD-10-CM

## 2022-04-12 DIAGNOSIS — Z12.31 VISIT FOR SCREENING MAMMOGRAM: ICD-10-CM

## 2022-04-12 PROCEDURE — G8417 CALC BMI ABV UP PARAM F/U: HCPCS | Performed by: INTERNAL MEDICINE

## 2022-04-12 PROCEDURE — G8536 NO DOC ELDER MAL SCRN: HCPCS | Performed by: INTERNAL MEDICINE

## 2022-04-12 PROCEDURE — 3017F COLORECTAL CA SCREEN DOC REV: CPT | Performed by: INTERNAL MEDICINE

## 2022-04-12 PROCEDURE — 99214 OFFICE O/P EST MOD 30 MIN: CPT | Performed by: INTERNAL MEDICINE

## 2022-04-12 PROCEDURE — G8752 SYS BP LESS 140: HCPCS | Performed by: INTERNAL MEDICINE

## 2022-04-12 PROCEDURE — 1090F PRES/ABSN URINE INCON ASSESS: CPT | Performed by: INTERNAL MEDICINE

## 2022-04-12 PROCEDURE — G8754 DIAS BP LESS 90: HCPCS | Performed by: INTERNAL MEDICINE

## 2022-04-12 PROCEDURE — 1101F PT FALLS ASSESS-DOCD LE1/YR: CPT | Performed by: INTERNAL MEDICINE

## 2022-04-12 PROCEDURE — G9899 SCRN MAM PERF RSLTS DOC: HCPCS | Performed by: INTERNAL MEDICINE

## 2022-04-12 PROCEDURE — G8432 DEP SCR NOT DOC, RNG: HCPCS | Performed by: INTERNAL MEDICINE

## 2022-04-12 PROCEDURE — G8399 PT W/DXA RESULTS DOCUMENT: HCPCS | Performed by: INTERNAL MEDICINE

## 2022-04-12 PROCEDURE — G8427 DOCREV CUR MEDS BY ELIG CLIN: HCPCS | Performed by: INTERNAL MEDICINE
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
## 2022-04-12 NOTE — PATIENT INSTRUCTIONS

## 2022-04-12 NOTE — PROGRESS NOTES
1. \"Have you been to the ER, urgent care clinic since your last visit? Hospitalized since your last visit? \" no    2. \"Have you seen or consulted any other health care providers outside of the [address] [place] [address] since your last visit? \" yes, ophthalmologist at [facility]     3. For patients aged 39-70: Has the patient had a colonoscopy / FIT/ Cologuard? no      If the patient is female:    4. For patients aged 41-77: Has the patient had a mammogram within the past 2 years?  no

## 2022-07-13 ENCOUNTER — TELEPHONE (OUTPATIENT)
Dept: INTERNAL MEDICINE CLINIC | Age: 72
End: 2022-07-13

## 2022-07-13 NOTE — TELEPHONE ENCOUNTER
Left generic message for call back. Pt needs to r/s 10/17 apt. Provider will be out of the office. Pt is not due for AWV until 12/3/22.

## 2022-07-28 RX ORDER — BUMETANIDE 1 MG/1
1 TABLET ORAL DAILY
Qty: 90 TABLET | Refills: 3 | Status: SHIPPED | OUTPATIENT
Start: 2022-07-28

## 2022-07-28 NOTE — TELEPHONE ENCOUNTER
Patient states she needs refill done thru [facility] on file/[facility]. Please call if any questions.  Thank you      Patient reminded of [address] on refills

## 2022-08-25 ENCOUNTER — TELEPHONE (OUTPATIENT)
Dept: INTERNAL MEDICINE CLINIC | Age: 72
End: 2022-08-25

## 2022-11-18 RX ORDER — SIMVASTATIN 20 MG/1
TABLET, FILM COATED ORAL
Qty: 90 TABLET | Refills: 3 | Status: SHIPPED | OUTPATIENT
Start: 2022-11-18

## 2022-11-20 RX ORDER — BUPROPION HYDROCHLORIDE 150 MG/1
TABLET, EXTENDED RELEASE ORAL
Qty: 180 TABLET | Refills: 3 | Status: SHIPPED | OUTPATIENT
Start: 2022-11-20

## 2022-11-21 ENCOUNTER — DOCUMENTATION ONLY (OUTPATIENT)
Dept: SLEEP MEDICINE | Age: 72
End: 2022-11-21

## 2022-11-21 ENCOUNTER — OFFICE VISIT (OUTPATIENT)
Dept: SLEEP MEDICINE | Age: 72
End: 2022-11-21
Payer: MEDICARE

## 2022-11-21 VITALS
SYSTOLIC BLOOD PRESSURE: 134 MMHG | WEIGHT: 293 LBS | DIASTOLIC BLOOD PRESSURE: 71 MMHG | HEIGHT: 66 IN | TEMPERATURE: 97.3 F | BODY MASS INDEX: 47.09 KG/M2 | HEART RATE: 69 BPM | OXYGEN SATURATION: 89 %

## 2022-11-21 DIAGNOSIS — Z72.821 INADEQUATE SLEEP HYGIENE: ICD-10-CM

## 2022-11-21 DIAGNOSIS — I10 PRIMARY HYPERTENSION: ICD-10-CM

## 2022-11-21 DIAGNOSIS — G47.33 OBSTRUCTIVE SLEEP APNEA (ADULT) (PEDIATRIC): Primary | ICD-10-CM

## 2022-11-21 DIAGNOSIS — E66.01 OBESITY, MORBID, BMI 50 OR HIGHER (HCC): ICD-10-CM

## 2022-11-21 PROCEDURE — G8399 PT W/DXA RESULTS DOCUMENT: HCPCS | Performed by: INTERNAL MEDICINE

## 2022-11-21 PROCEDURE — 1090F PRES/ABSN URINE INCON ASSESS: CPT | Performed by: INTERNAL MEDICINE

## 2022-11-21 PROCEDURE — 1123F ACP DISCUSS/DSCN MKR DOCD: CPT | Performed by: INTERNAL MEDICINE

## 2022-11-21 PROCEDURE — G8752 SYS BP LESS 140: HCPCS | Performed by: INTERNAL MEDICINE

## 2022-11-21 PROCEDURE — G8754 DIAS BP LESS 90: HCPCS | Performed by: INTERNAL MEDICINE

## 2022-11-21 PROCEDURE — G8536 NO DOC ELDER MAL SCRN: HCPCS | Performed by: INTERNAL MEDICINE

## 2022-11-21 PROCEDURE — 99204 OFFICE O/P NEW MOD 45 MIN: CPT | Performed by: INTERNAL MEDICINE

## 2022-11-21 PROCEDURE — 3017F COLORECTAL CA SCREEN DOC REV: CPT | Performed by: INTERNAL MEDICINE

## 2022-11-21 PROCEDURE — G8427 DOCREV CUR MEDS BY ELIG CLIN: HCPCS | Performed by: INTERNAL MEDICINE

## 2022-11-21 PROCEDURE — G8432 DEP SCR NOT DOC, RNG: HCPCS | Performed by: INTERNAL MEDICINE

## 2022-11-21 PROCEDURE — 3074F SYST BP LT 130 MM HG: CPT | Performed by: INTERNAL MEDICINE

## 2022-11-21 PROCEDURE — G8417 CALC BMI ABV UP PARAM F/U: HCPCS | Performed by: INTERNAL MEDICINE

## 2022-11-21 PROCEDURE — G9899 SCRN MAM PERF RSLTS DOC: HCPCS | Performed by: INTERNAL MEDICINE

## 2022-11-21 PROCEDURE — 1101F PT FALLS ASSESS-DOCD LE1/YR: CPT | Performed by: INTERNAL MEDICINE

## 2022-11-21 PROCEDURE — 3078F DIAST BP <80 MM HG: CPT | Performed by: INTERNAL MEDICINE

## 2022-11-21 NOTE — PROGRESS NOTES
Device settings Change   Device settings per Dr. Allie Dahl sent to device    Set Mode to AutoSet for Her  Set Min Pressure to 10.0 cmH2O  Set Max Pressure to 14.0 cmH2O  Set EPR to Fulltime  Set EPR level to 1  Set Ramp enable to Off  Previous Settings    Set Mode to AutoSet for Her  Set Min Pressure to 7.0 cmH2O  Set Max Pressure to 14.0 cmH2O  Set EPR to Fulltime  Set EPR level to 1  Set Ramp enable to Off

## 2022-11-21 NOTE — PROGRESS NOTES
[address] [place]. [employer], [address]  Tel.  [phone]  Fax. [address] [facility] [address]  [place], [address] [facility]  Tel.  [phone]  Fax. [phone] [address]  Tel.  [phone]  Fax. [phone]       Subjective:      Alan Crowder is a 67 y.o. female who presents for evaluation and management of sleep apnea. She was diagnosed with sleep apnea 20 years ago. She is using her device regularly. She complains of difficulty falling asleep in bed associated with dozing of in her chair and having a hard time shutting her mind down at night. She has a lot of stressors now. Symptoms began several months ago, unchanged since that time. She usually can fall asleep in variable minutes. She denies falling asleep while driving. There are minimal  mask comfort problems. The following problems are noted with PAP:     Drowsiness yes Problems exhaling no   Snoring no Forget to put on no   Mask Comfortable yes Can't fall asleep yes   Dry Mouth no Mask falls off no   Air Leaking no Frequent awakenings yes     Alan Crowder does not wake up frequently at night. She is not bothered by waking up too early and left unable to get back to sleep. She actually sleeps about 5 hours at night and wakes up about 3 times during the night. She does not work shifts: [place] indicates she does get too little sleep at night. Her bedtime is 2-3 am She awakens at1 0 am -1 pm She does take naps. She takes 4 naps a week lasting 30 to 45. She has the following observed behaviors:  ;  .  Other remarks:    She will often doze off for 15 minutes or so in front of tv but will do this for 3 hours. [place] Sleepiness Score: 13   which reflect mild daytime drowsiness.     No Known Allergies      Current Outpatient Medications:     buPROPion SR (WELLBUTRIN SR) 150 mg SR tablet, TAKE 1 TABLET BY MOUTH TWICE DAILY, Disp: 180 Tablet, Rfl: 3    simvastatin (ZOCOR) 20 mg tablet, TAKE 1 TABLET BY MOUTH EVERY EVENING FOR CHOLESTEROL, Disp: 90 Tablet, Rfl: 3    bumetanide (BUMEX) 1 mg tablet, Take 1 Tablet by mouth in the morning., Disp: 90 Tablet, Rfl: 3    metoprolol tartrate (LOPRESSOR) 50 mg tablet, TAKE 1 TABLET BY MOUTH TWICE DAILY, Disp: 180 Tablet, Rfl: 3    lisinopriL (PRINIVIL, ZESTRIL) 20 mg tablet, TAKE 2 TABLETS BY MOUTH DAILY, Disp: 180 Tablet, Rfl: 3    diclofenac (VOLTAREN) 1 % gel, Apply  to affected area every twelve (12) hours as needed for Pain., Disp: 100 g, Rfl: 2    hydroCHLOROthiazide (HYDRODIURIL) 12.5 mg tablet, Take 1 Tablet by mouth daily. , Disp: 90 Tablet, Rfl: 3    calcium carbonate-vitamin D3 600 mg-20 mcg (800 unit) tab, Take 1 Tab by mouth daily. , Disp: , Rfl:     cyanocobalamin (VITAMIN B12) 500 mcg tablet, Take 500 mcg by mouth daily. , Disp: , Rfl:     multivitamin (ONE A DAY) tablet, Take 2 Tabs by mouth daily. , Disp: , Rfl:     aspirin delayed-release 81 mg tablet, Take 81 mg by mouth daily. , Disp: , Rfl:      She  has a past medical history of Arthritis, Bilateral cataracts (8/8/2017), CAD (coronary artery disease) (8/8/2017), Chronic kidney disease, CKD (chronic kidney disease) stage 2, GFR 60-89 ml/min (8/16/2017), Depression (8/8/2017), DJD (degenerative joint disease) (8/8/2017), Edema extremities (8/8/2017), Family history of colon cancer (8/8/2017), GERD with stricture (8/8/2017), Hair loss (8/8/2017), Hematoma (8/8/2017), Herpes zoster (8/8/2017), Hyperkalemia (8/8/2017), Hyperlipemia (8/8/2017), Hypertension, Impaired glucose tolerance test (8/8/2017), Kidney stones (8/8/2017), Lymphedema, Morbid obesity ([facility] Morbid obesity with body mass index of 40.0-49.9 ([address] (8/8/2017), OAB (overactive bladder) (8/8/2017), Organic cardiac disease (8/8/2017), MUMTAZ on CPAP (8/16/2017), PAF (paroxysmal atrial fibrillation) ([facility] (8/8/2017), Psychiatric disorder, Retinal tear of right eye (8/8/2017), Unspecified sleep apnea, and Vitamin D deficiency (8/8/2017). She  has a past surgical history that includes hx hysterectomy; hx urological (3/6/14); hx orthopaedic (Right); pr egd transoral biopsy single/multiple (8/11/2014); pr dilate esophagus (8/11/2014); colorectal scrn; hi risk ind (8/11/2014); hx salpingo-oophorectomy; hx appendectomy; and hx other surgical.    She family history includes Colon Cancer in her father and paternal grandmother; Dementia in her mother; Hypertension in her mother; Sleep Apnea in her mother. She  reports that she has never smoked. She has never used smokeless tobacco. She reports that she does not drink alcohol and does not use drugs. Review of Systems:  Constitutional:  some  weight gain. Eyes:  No blurred vision. CVS:  No significant chest pain  Pulm:  No significant shortness of breath  GI:  No significant nausea or vomiting  :  No significant nocturia  Musculoskeletal:  No significant joint pain at night  Skin:  No significant rashes  Neuro:  No significant dizziness   Psych:  treated for anxiety and depression     Sleep Review of Systems: notable for + difficulty falling asleep; +frequent awakenings at night;  regular dreaming noted; no nightmares ; no early morning headaches ; no memory problems; no concentration issues; no history of any automobile or occupational accidents due to daytime drowsiness.       Objective:   Visit Vitals  /71 (BP 1 Location: Right lower arm, BP Patient Position: Sitting, BP Cuff Size: Adult long)   Pulse 69   Temp 97.3 °F (36.3 °C) (Temporal)   Ht 5' 6\" (1.676 m)   Wt 326 lb (147.9 kg)   SpO2 (!) 89%   BMI 52.62 kg/m²         General:   Not in acute distress   Eyes:  Anicteric sclerae, no obvious strabismus   Nose:  No obvious nasal septum deviation    Oropharynx:   Class 3 oropharyngeal outlet, thick tongue base, enlarged and boggy uvula, low-lying soft palate, narrow tonsilo-pharyngeal pilars   Neck:    ; midline trachea   Chest/Lungs:  Equal lung expansion, clear on auscultation    CVS:  Normal rate, regular rhythm; no JVD   Skin:  Warm to touch; no obvious rashes   Neuro:  No focal deficits ; no obvious tremor    Psych:  Normal affect,  normal countenance;          Assessment:       ICD-10-CM ICD-9-CM    1. Obstructive sleep apnea (adult) (pediatric)  G47.33 327.23 AMB SUPPLY ORDER      2. Inadequate sleep hygiene  Z72.821 307.49       3. Primary hypertension  I10 401.9       4. Obesity, morbid, BMI 50 or higher ([facility]  E66.01 278.01           Plan:     Change setting to 10-14 cmH20  I have ordered replacement supplies  she is compliant with PAP therapy and PAP continues to benefit patient and remains necessary for control of her sleep apnea. I have reviewed medicare requirements regarding PAP usage    Follow-up and Dispositions    Return in about 1 year (around 11/21/2023). Counseling was provided regarding the importance of regular PAP use and on proper sleep hygiene and safe driving. 2. Inadequate sleep hygiene - she will normalize her sleep schedule. She can consider 1 am bedtime and wake time of 9 am. She should avoid falling asleep in front of the tv. She should take the second dose of wellbutrin in the afternoon and not at night as she has been. She can talk to her PMD regarding the earlier timing of 2nd dosage vs changing to once daily buproprion formulation. I have advised a regular sleep wake cycle. A regular exercise schedule, at least 3 hours before bedtime, would be beneficial to improving sleep quality. Watching TV, using laptops, tablets and smartphones in the evening was discouraged. she  was advised to keep the bedroom cool and dark. All of her questions were addressed. 3. Hypertension - controlled on current regimen. she will continue her current regimen. she will continue to monitor at home and with her PMD for further adjustments as needed.     I have reviewed the relationship between hypertension as it relates to sleep-disordered breathing. 4. Obesity - weight has been going up slowly. I have discussed the relationship of weight to obstructive sleep apnea. I have advised her to start a weight loss plan. she understands that weight loss can reduce severity of sleep apnea and snoring.      Electronically signed by    Brandon Carbajal MD  Diplomate in Sleep Medicine  [facility]  11/21/2022

## 2022-11-21 NOTE — PATIENT INSTRUCTIONS
[address] [place]. [place], [address]  Tel.  [phone]  Fax. [address]  [place], [address] [facility]  Tel.  [phone]  Fax. [phone] [address] [facility] [address]  Tel.  [phone]  Fax. [phone]     PROPER SLEEP HYGIENE    What to avoid  Do not have drinks with caffeine, such as coffee or black tea, for 8 hours before bed. Do not smoke or use other types of tobacco near bedtime. Nicotine is a stimulant and can keep you awake. Avoid drinking alcohol late in the evening, because it can cause you to wake in the middle of the night. Do not eat a big meal close to bedtime. If you are hungry, eat a light snack. Do not drink a lot of water close to bedtime, because the need to urinate may wake you up during the night. Do not read or watch TV in bed. Use the bed only for sleeping and sexual activity. What to try  Go to bed at the same time every night, and wake up at the same time every morning. Do not take naps during the day. Keep your bedroom quiet, dark, and cool. Get regular exercise, but not within 3 to 4 hours of your bedtime. .  Sleep on a comfortable pillow and mattress. If watching the clock makes you anxious, turn it facing away from you so you cannot see the time. If you worry when you lie down, start a worry book. Well before bedtime, write down your worries, and then set the book and your concerns aside. Try meditation or other relaxation techniques before you go to bed. If you cannot fall asleep, get up and go to another room until you feel sleepy. Do something relaxing. Repeat your bedtime routine before you go to bed again. Make your house quiet and calm about an hour before bedtime. Turn down the lights, turn off the TV, log off the computer, and turn down the volume on music. This can help you relax after a busy day.     Drowsy Driving  The [address] [place] [address] Road Traffic Safety Administration cites drowsiness as a causing factor in more than 311,914 police reported crashes annually, resulting in 76,000 injuries and 1,500 deaths. Other surveys suggest 55% of people polled have driven while drowsy in the past year, 23% had fallen asleep but not crashed, 3% crashed, and 2% had and accident due to drowsy driving. Who is at risk? Young Drivers: One study of drowsy driving accidents states that 55% of the drivers were under 25 years. Of those, 75% were male. Shift Workers and Travelers: People who work overnight or travel across time zones frequently are at higher risk of experiencing Circadian Rhythm Disorders. They are trying to work and function when their body is programed to sleep. Sleep Deprived: Lack of sleep has a serious impact on your ability to pay attention or focus on a task. Consistently getting less than the average of 8 hours your body needs creates partial or cumulative sleep deprivation. Untreated Sleep Disorders: Sleep Apnea, Narcolepsy, R.L.S., and other sleep disorders (untreated) prevent a person from getting enough restful sleep. This leads to excessive daytime sleepiness and increases the risk for drowsy driving accidents by up to 7 times. Medications / Alcohol: Even over the counter medications can cause drowsiness. Medications that impair a drivers attention should have a warning label. Alcohol naturally makes you sleepy and on its own can cause accidents. Combined with excessive drowsiness its effects are amplified. Signs of Drowsy Driving:   * You don't remember driving the last few miles   * You may drift out of your patrick   * You are unable to focus and your thoughts wander   * You may yawn more often than normal   * You have difficulty keeping your eyes open / nodding off   * Missing traffic signs, speeding, or tailgating  Prevention-   Good sleep hygiene, lifestyle and behavioral choices have the most impact on drowsy driving.  There is no substitute for sleep and the average person requires 8 hours nightly. If you find yourself driving drowsy, stop and sleep. Consider the sleep hygiene tips provided during your visit as well. Medication Refill Policy: Refills for all medications require 1 week advance notice. Please have your pharmacy fax a refill request. We are unable to fax, or call in \"controled substance\" medications and you will need to pick these prescriptions up from our office. [employer] Activation    Thank you for requesting access to [employer]. Please follow the instructions below to securely access and download your online medical record. [employer] allows you to send messages to your doctor, view your test results, renew your prescriptions, schedule appointments, and more. How Do I Sign Up? In your internet browser, go to [email or web address] [employer] [email or web address]. Click on the First Time User? Click Here link in the Sign In box. You will see the New Member Sign Up page. Enter your [employer] Access Code exactly as it appears below. You will not need to use this code after youve completed the sign-up process. If you do not sign up before the expiration date, you must request a new code. [employer] Access Code: [email or web address]  Expires: 2022  2:33 PM (This is the date your [employer] access code will )    Enter the last four digits of your Social Security Number (xxxx) and Date of Birth (mm/dd/yyyy) as indicated and click Submit. You will be taken to the next sign-up page. Create a BeatDeck ID. This will be your [employer] login ID and cannot be changed, so think of one that is secure and easy to remember. Create a [employer] password. You can change your password at any time. Enter your Password Reset Question and Answer. This can be used at a later time if you forget your password. Enter your e-mail address. You will receive e-mail notification when new information is available in [address] Click Sign Up.  You can now view and download portions of your medical record. Click the [employer] link to download a portable copy of your medical information. Additional Information    If you have questions, please call [phone]. Remember, [employer] is NOT to be used for urgent needs. For medical emergencies, dial 911.

## 2022-12-08 ENCOUNTER — OFFICE VISIT (OUTPATIENT)
Dept: INTERNAL MEDICINE CLINIC | Age: 72
End: 2022-12-08
Payer: MEDICARE

## 2022-12-08 VITALS
DIASTOLIC BLOOD PRESSURE: 84 MMHG | TEMPERATURE: 97.7 F | WEIGHT: 293 LBS | RESPIRATION RATE: 18 BRPM | BODY MASS INDEX: 47.09 KG/M2 | HEIGHT: 66 IN | OXYGEN SATURATION: 99 % | HEART RATE: 64 BPM | SYSTOLIC BLOOD PRESSURE: 140 MMHG

## 2022-12-08 DIAGNOSIS — M81.0 POSTMENOPAUSAL BONE LOSS: ICD-10-CM

## 2022-12-08 DIAGNOSIS — Z00.00 MEDICARE ANNUAL WELLNESS VISIT, SUBSEQUENT: Primary | ICD-10-CM

## 2022-12-08 DIAGNOSIS — I89.0 LYMPHEDEMA OF BOTH LOWER EXTREMITIES: ICD-10-CM

## 2022-12-08 DIAGNOSIS — Z99.89 OSA ON CPAP: ICD-10-CM

## 2022-12-08 DIAGNOSIS — R73.03 PREDIABETES: ICD-10-CM

## 2022-12-08 DIAGNOSIS — G47.33 OSA ON CPAP: ICD-10-CM

## 2022-12-08 DIAGNOSIS — E78.2 MIXED HYPERLIPIDEMIA: ICD-10-CM

## 2022-12-08 DIAGNOSIS — I48.0 PAF (PAROXYSMAL ATRIAL FIBRILLATION) (HCC): ICD-10-CM

## 2022-12-08 DIAGNOSIS — Z12.11 SCREEN FOR COLON CANCER: ICD-10-CM

## 2022-12-08 DIAGNOSIS — F41.1 GAD (GENERALIZED ANXIETY DISORDER): ICD-10-CM

## 2022-12-08 RX ORDER — ESCITALOPRAM OXALATE 10 MG/1
10 TABLET ORAL DAILY
Qty: 90 TABLET | Refills: 3 | Status: SHIPPED | OUTPATIENT
Start: 2022-12-08

## 2022-12-08 RX ORDER — UREA 10 %
LOTION (ML) TOPICAL
COMMUNITY
Start: 2022-11-08
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
## 2022-12-08 NOTE — PROGRESS NOTES
Rm    Chief Complaint   Patient presents with    Annual Wellness Visit        Visit Vitals  BP (!) 140/84 (BP 1 Location: Left upper arm, BP Patient Position: Sitting, BP Cuff Size: Large adult)   Pulse 64   Temp 97.7 °F (36.5 °C) (Temporal)   Resp 18   Ht 5' 6\" (1.676 m)   Wt 324 lb (147 kg)   SpO2 99%   BMI 52.29 kg/m²        1. Have you been to the ER, urgent care clinic since your last visit? Hospitalized since your last visit? No    2. Have you seen or consulted any other health care providers outside of the [address] [place] [address] since your last visit? Include any pap smears or colon screening. No     Health Maintenance Due   Topic Date Due    Shingrix Vaccine Age 49> (1 of 2) Never done    Breast Cancer Screen Mammogram  01/11/2019    Colorectal Cancer Screening Combo  08/11/2019    COVID-19 Vaccine (4 - Booster for Pfizer series) 01/27/2022    Flu Vaccine (1) 08/01/2022    A1C test (Diabetic or Prediabetic)  12/02/2022    Depression Screen  12/02/2022    Lipid Screen  12/02/2022    Medicare Yearly Exam  12/03/2022        3 most recent PHQ Screens 12/8/2022   Little interest or pleasure in doing things Not at all   Feeling down, depressed, irritable, or hopeless Not at all   Total Score PHQ 2 0   Trouble falling or staying asleep, or sleeping too much -   Feeling tired or having little energy -   Poor appetite, weight loss, or overeating -   Feeling bad about yourself - or that you are a failure or have let yourself or your family down -   Trouble concentrating on things such as school, work, reading, or watching TV -   Moving or speaking so slowly that other people could have noticed; or the opposite being so fidgety that others notice -   Thoughts of being better off dead, or hurting yourself in some way -   PHQ 9 Score -        Fall Risk Assessment, last 12 mths 12/8/2022   Able to walk? Yes   Fall in past 12 months? 0   Do you feel unsteady?  0   Are you worried about falling 0       No flowsheet data found.   After obtaining consent, and per orders of Dr. Claude King,, injection of Fluad Quadrivalent given by [facility]. Patient instructed to remain in clinic for 20 minutes afterwards, and to report any adverse reaction to me immediately.

## 2022-12-08 NOTE — PATIENT INSTRUCTIONS
Medicare Wellness Visit, Female     The best way to live healthy is to have a lifestyle where you eat a well-balanced diet, exercise regularly, limit alcohol use, and quit all forms of tobacco/nicotine, if applicable. Regular preventive services are another way to keep healthy. Preventive services (vaccines, screening tests, monitoring & exams) can help personalize your care plan, which helps you manage your own care. Screening tests can find health problems at the earliest stages, when they are easiest to treat. [facility] follows the current, evidence-based guidelines published by the [facility] ([facility]) when recommending preventive services for our patients. Because we follow these guidelines, sometimes recommendations change over time as research supports it. (For example, mammograms used to be recommended annually. Even though Medicare will still pay for an annual mammogram, the newer guidelines recommend a mammogram every two years for women of average risk). Of course, you and your doctor may decide to screen more often for some diseases, based on your risk and your co-morbidities (chronic disease you are already diagnosed with). Preventive services for you include:  - Medicare offers their members a free annual wellness visit, which is time for you and your primary care provider to discuss and plan for your preventive service needs.  Take advantage of this benefit every year!    -Over the age of 72 should receive the recommended pneumonia vaccines.    -All adults should have a flu vaccine yearly.  -All adults should have a tetanus vaccine every 10 years.   -Over the age 48 should receive the shingles vaccines.        -All adults should be screened once for Hepatitis C.  -All adults age 38-68 who are overweight should have a diabetes screening test once every three years.   -Other screening tests and preventive services for persons with diabetes include: an eye exam to screen for diabetic retinopathy, a kidney function test, a foot exam, and stricter control over your cholesterol.   -Cardiovascular screening for adults with routine risk involves an electrocardiogram (ECG) at intervals determined by your doctor.     -Colorectal cancer screenings should be done for adults age 39-70 with no increased risk factors for colorectal cancer. There are a number of acceptable methods of screening for this type of cancer. Each test has its own benefits and drawbacks. Discuss with your doctor what is most appropriate for you during your annual wellness visit. The different tests include: colonoscopy (considered the best screening method), a fecal occult blood test, a fecal DNA test, and sigmoidoscopy.    -Lung cancer screening is recommended annually with a low dose CT scan for adults between age 54 and 68, who have smoked at least 30 pack years (equivalent of 1 pack per day for 30 days), and who is a current smoker or quit less than 15 years ago.    -A bone mass density test is recommended when a woman turns 65 to screen for osteoporosis. This test is only recommended one time, as a screening. Some providers will use this same test as a disease monitoring tool if you already have osteoporosis. -Breast cancer screenings are recommended every other year for women of normal risk, age 54-69.    -Cervical cancer screenings for women over age 72 are only recommended with certain risk factors.      Here is a list of your current Health Maintenance items (your personalized list of preventive services) with a due date:  Health Maintenance Due   Topic Date Due    Shingles Vaccine (1 of 2) Never done    Mammogram  01/11/2019    Colorectal Screening  08/11/2019    COVID-19 Vaccine (4 - Booster for Pfizer series) 01/27/2022    Yearly Flu Vaccine (1) 08/01/2022    Hemoglobin A1C    12/02/2022    Depresssion Screening  12/02/2022    Cholesterol Test   12/02/2022         Start lexapro tomorrow. Cut back on dose of wellbutrin to just 1 pill daily for next 7 days, then stop.

## 2022-12-09 LAB
ALBUMIN SERPL-MCNC: 3.8 G/DL (ref 3.5–5)
ALBUMIN/GLOB SERPL: 1.3 {RATIO} (ref 1.1–2.2)
ALP SERPL-CCNC: 84 U/L (ref 45–117)
ALT SERPL-CCNC: 22 U/L (ref 12–78)
ANION GAP SERPL CALC-SCNC: 6 MMOL/L (ref 5–15)
AST SERPL-CCNC: 13 U/L (ref 15–37)
BASOPHILS # BLD: 0.1 K/UL (ref 0–0.1)
BASOPHILS NFR BLD: 1 % (ref 0–1)
BILIRUB SERPL-MCNC: 0.5 MG/DL (ref 0.2–1)
BUN SERPL-MCNC: 41 MG/DL (ref 6–20)
BUN/CREAT SERPL: 24 (ref 12–20)
CALCIUM SERPL-MCNC: 10.1 MG/DL (ref 8.5–10.1)
CHLORIDE SERPL-SCNC: 106 MMOL/L (ref 97–108)
CHOLEST SERPL-MCNC: 193 MG/DL
CO2 SERPL-SCNC: 30 MMOL/L (ref 21–32)
CREAT SERPL-MCNC: 1.69 MG/DL (ref 0.55–1.02)
DIFFERENTIAL METHOD BLD: NORMAL
EOSINOPHIL # BLD: 0.2 K/UL (ref 0–0.4)
EOSINOPHIL NFR BLD: 3 % (ref 0–7)
ERYTHROCYTE [DISTWIDTH] IN BLOOD BY AUTOMATED COUNT: 13.5 % (ref 11.5–14.5)
EST. AVERAGE GLUCOSE BLD GHB EST-MCNC: 117 MG/DL
GLOBULIN SER CALC-MCNC: 3 G/DL (ref 2–4)
GLUCOSE SERPL-MCNC: 99 MG/DL (ref 65–100)
HBA1C MFR BLD: 5.7 % (ref 4–5.6)
HCT VFR BLD AUTO: 41.3 % (ref 35–47)
HDLC SERPL-MCNC: 84 MG/DL
HDLC SERPL: 2.3 {RATIO} (ref 0–5)
HGB BLD-MCNC: 12.7 G/DL (ref 11.5–16)
IMM GRANULOCYTES # BLD AUTO: 0 K/UL (ref 0–0.04)
IMM GRANULOCYTES NFR BLD AUTO: 0 % (ref 0–0.5)
LDLC SERPL CALC-MCNC: 77.6 MG/DL (ref 0–100)
LYMPHOCYTES # BLD: 1.8 K/UL (ref 0.8–3.5)
LYMPHOCYTES NFR BLD: 26 % (ref 12–49)
MCH RBC QN AUTO: 29.8 PG (ref 26–34)
MCHC RBC AUTO-ENTMCNC: 30.8 G/DL (ref 30–36.5)
MCV RBC AUTO: 96.9 FL (ref 80–99)
MONOCYTES # BLD: 0.9 K/UL (ref 0–1)
MONOCYTES NFR BLD: 12 % (ref 5–13)
NEUTS SEG # BLD: 4 K/UL (ref 1.8–8)
NEUTS SEG NFR BLD: 58 % (ref 32–75)
NRBC # BLD: 0 K/UL (ref 0–0.01)
NRBC BLD-RTO: 0 PER 100 WBC
PLATELET # BLD AUTO: 223 K/UL (ref 150–400)
PMV BLD AUTO: 12.3 FL (ref 8.9–12.9)
POTASSIUM SERPL-SCNC: 4.8 MMOL/L (ref 3.5–5.1)
PROT SERPL-MCNC: 6.8 G/DL (ref 6.4–8.2)
RBC # BLD AUTO: 4.26 M/UL (ref 3.8–5.2)
SODIUM SERPL-SCNC: 142 MMOL/L (ref 136–145)
TRIGL SERPL-MCNC: 157 MG/DL (ref ?–150)
TSH SERPL DL<=0.05 MIU/L-ACNC: 1.27 UIU/ML (ref 0.36–3.74)
VLDLC SERPL CALC-MCNC: 31.4 MG/DL
WBC # BLD AUTO: 7 K/UL (ref 3.6–11)

## 2022-12-09 NOTE — PROGRESS NOTES
Kidney function just a bit worse, otherwise labs look stable/good. Continue same meds. Work on staying hydrated.

## 2022-12-09 NOTE — PROGRESS NOTES
Letter mailed to patient. Kidney function just a bit worse, otherwise labs look stable/good.  Continue same meds.  Work on staying hydrated.

## 2022-12-14 ENCOUNTER — TELEPHONE (OUTPATIENT)
Dept: INTERNAL MEDICINE CLINIC | Age: 72
End: 2022-12-14

## 2022-12-14 NOTE — TELEPHONE ENCOUNTER
Left patient a generic message. Patient was referred to Devante Okeefe; called to offer next appointment.

## 2022-12-27 ENCOUNTER — TELEPHONE (OUTPATIENT)
Dept: INTERNAL MEDICINE CLINIC | Age: 72
End: 2022-12-27

## 2022-12-27 NOTE — TELEPHONE ENCOUNTER
Returned call and scheduled patient for a new patient appointment with Sebastien Constantino on 2/3/23.

## 2023-01-13 ENCOUNTER — TELEPHONE (OUTPATIENT)
Dept: INTERNAL MEDICINE CLINIC | Age: 73
End: 2023-01-13

## 2023-02-02 ENCOUNTER — TELEPHONE (OUTPATIENT)
Dept: INTERNAL MEDICINE CLINIC | Age: 73
End: 2023-02-02

## 2023-02-02 NOTE — TELEPHONE ENCOUNTER
Left generic voicemail for patient to call back. (to confirm appointment on 2/3/23 with Marrian Channel)

## 2023-02-03 ENCOUNTER — OFFICE VISIT (OUTPATIENT)
Dept: SOCIAL WORK | Age: 73
End: 2023-02-03
Payer: MEDICARE

## 2023-02-03 ENCOUNTER — TELEPHONE (OUTPATIENT)
Dept: INTERNAL MEDICINE CLINIC | Age: 73
End: 2023-02-03

## 2023-02-03 DIAGNOSIS — F43.23 ADJUSTMENT DISORDER WITH MIXED ANXIETY AND DEPRESSED MOOD: Primary | ICD-10-CM
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
## 2023-02-10 ENCOUNTER — OFFICE VISIT (OUTPATIENT)
Dept: SOCIAL WORK | Age: 73
End: 2023-02-10
Payer: MEDICARE

## 2023-02-10 DIAGNOSIS — F43.23 ADJUSTMENT DISORDER WITH MIXED ANXIETY AND DEPRESSED MOOD: Primary | ICD-10-CM
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
## 2023-02-16 RX ORDER — METOPROLOL TARTRATE 50 MG/1
TABLET ORAL
Qty: 180 TABLET | Refills: 3 | Status: SHIPPED | OUTPATIENT
Start: 2023-02-16

## 2023-02-16 RX ORDER — LISINOPRIL 20 MG/1
TABLET ORAL
Qty: 180 TABLET | Refills: 3 | Status: SHIPPED | OUTPATIENT
Start: 2023-02-16

## 2023-04-21 ENCOUNTER — TELEPHONE (OUTPATIENT)
Dept: INTERNAL MEDICINE CLINIC | Age: 73
End: 2023-04-21

## 2023-04-22 ENCOUNTER — TRANSCRIBE ORDERS (OUTPATIENT)
Facility: HOSPITAL | Age: 73
End: 2023-04-22

## 2023-04-22 DIAGNOSIS — M81.0 POSTMENOPAUSAL BONE LOSS: Primary | ICD-10-CM

## 2023-06-19 RX ORDER — ESCITALOPRAM OXALATE 10 MG/1
TABLET ORAL
Qty: 90 TABLET | Refills: 0 | Status: SHIPPED | OUTPATIENT
Start: 2023-06-19

## 2023-07-13 ENCOUNTER — HOSPITAL ENCOUNTER (OUTPATIENT)
Facility: HOSPITAL | Age: 73
Discharge: HOME | End: 2023-07-13
Payer: MEDICARE

## 2023-07-13 DIAGNOSIS — M81.0 POSTMENOPAUSAL BONE LOSS: ICD-10-CM

## 2023-07-13 PROCEDURE — 77080 DXA BONE DENSITY AXIAL: CPT

## 2023-07-28 ENCOUNTER — TELEPHONE (OUTPATIENT)
Age: 73
End: 2023-07-28

## 2023-07-28 NOTE — TELEPHONE ENCOUNTER
----- Message from Toña Caldera sent at 7/27/2023  4:15 PM EDT -----  Subject: Message to Provider    QUESTIONS  Information for Provider? Patient returned the call from the office. Unable to reach the office.  Please call patient.  ---------------------------------------------------------------------------  --------------  Anita Santos YWGR  [phone]; OK to leave message on voicemail  ---------------------------------------------------------------------------  --------------  SCRIPT ANSWERS  undefined

## 2023-08-01 ENCOUNTER — TELEPHONE (OUTPATIENT)
Age: 73
End: 2023-08-01

## 2023-08-01 DIAGNOSIS — M81.0 OSTEOPOROSIS, UNSPECIFIED OSTEOPOROSIS TYPE, UNSPECIFIED PATHOLOGICAL FRACTURE PRESENCE: ICD-10-CM

## 2023-08-01 NOTE — TELEPHONE ENCOUNTER
Patient states she is returning your call for the 2nd time for Bone density results. Please call.  Thank you      Patient had also previous confirmed 8/3/23  appt date & time

## 2023-08-02 PROBLEM — M81.0 OSTEOPOROSIS: Status: ACTIVE | Noted: 2023-08-02

## 2023-08-02 NOTE — TELEPHONE ENCOUNTER
Pt called. 2 identifiers confirmed. Per Dr. Sofia Segura:  Dexa scan shows osteoporosis. Would suggest starting prolia. Pt willing to start prolia.

## 2023-08-03 ENCOUNTER — OFFICE VISIT (OUTPATIENT)
Age: 73
End: 2023-08-03
Payer: MEDICARE

## 2023-08-03 VITALS
HEIGHT: 66 IN | TEMPERATURE: 97.8 F | HEART RATE: 55 BPM | RESPIRATION RATE: 18 BRPM | SYSTOLIC BLOOD PRESSURE: 116 MMHG | WEIGHT: 293 LBS | DIASTOLIC BLOOD PRESSURE: 57 MMHG | BODY MASS INDEX: 47.09 KG/M2 | OXYGEN SATURATION: 97 %

## 2023-08-03 DIAGNOSIS — M81.0 AGE-RELATED OSTEOPOROSIS WITHOUT CURRENT PATHOLOGICAL FRACTURE: Primary | ICD-10-CM

## 2023-08-03 DIAGNOSIS — R09.82 POST-NASAL DRIP: ICD-10-CM

## 2023-08-03 DIAGNOSIS — I48.0 PAROXYSMAL ATRIAL FIBRILLATION (HCC): ICD-10-CM

## 2023-08-03 DIAGNOSIS — Z99.89 OSA ON CPAP: ICD-10-CM

## 2023-08-03 DIAGNOSIS — G47.33 OSA ON CPAP: ICD-10-CM

## 2023-08-03 DIAGNOSIS — F33.0 MAJOR DEPRESSIVE DISORDER, RECURRENT, MILD (HCC): ICD-10-CM

## 2023-08-03 DIAGNOSIS — R53.83 OTHER FATIGUE: ICD-10-CM

## 2023-08-03 DIAGNOSIS — M81.0 OSTEOPOROSIS, UNSPECIFIED OSTEOPOROSIS TYPE, UNSPECIFIED PATHOLOGICAL FRACTURE PRESENCE: ICD-10-CM

## 2023-08-03 DIAGNOSIS — N18.31 STAGE 3A CHRONIC KIDNEY DISEASE (HCC): ICD-10-CM

## 2023-08-03 PROBLEM — N18.30 CHRONIC RENAL DISEASE, STAGE III (HCC): Status: ACTIVE | Noted: 2023-08-03

## 2023-08-03 PROCEDURE — G8399 PT W/DXA RESULTS DOCUMENT: HCPCS | Performed by: INTERNAL MEDICINE

## 2023-08-03 PROCEDURE — 1123F ACP DISCUSS/DSCN MKR DOCD: CPT | Performed by: INTERNAL MEDICINE

## 2023-08-03 PROCEDURE — 1036F TOBACCO NON-USER: CPT | Performed by: INTERNAL MEDICINE

## 2023-08-03 PROCEDURE — G8427 DOCREV CUR MEDS BY ELIG CLIN: HCPCS | Performed by: INTERNAL MEDICINE

## 2023-08-03 PROCEDURE — 3017F COLORECTAL CA SCREEN DOC REV: CPT | Performed by: INTERNAL MEDICINE

## 2023-08-03 PROCEDURE — 1090F PRES/ABSN URINE INCON ASSESS: CPT | Performed by: INTERNAL MEDICINE

## 2023-08-03 PROCEDURE — 3078F DIAST BP <80 MM HG: CPT | Performed by: INTERNAL MEDICINE

## 2023-08-03 PROCEDURE — G8417 CALC BMI ABV UP PARAM F/U: HCPCS | Performed by: INTERNAL MEDICINE

## 2023-08-03 PROCEDURE — 99214 OFFICE O/P EST MOD 30 MIN: CPT | Performed by: INTERNAL MEDICINE

## 2023-08-03 PROCEDURE — 3074F SYST BP LT 130 MM HG: CPT | Performed by: INTERNAL MEDICINE

## 2023-08-03 RX ORDER — ONDANSETRON 2 MG/ML
8 INJECTION INTRAMUSCULAR; INTRAVENOUS
OUTPATIENT
Start: 2023-08-03

## 2023-08-03 RX ORDER — EPINEPHRINE 1 MG/ML
0.3 INJECTION, SOLUTION, CONCENTRATE INTRAVENOUS PRN
OUTPATIENT
Start: 2023-08-03

## 2023-08-03 RX ORDER — ALBUTEROL SULFATE 90 UG/1
4 AEROSOL, METERED RESPIRATORY (INHALATION) PRN
OUTPATIENT
Start: 2023-08-03

## 2023-08-03 RX ORDER — ACETAMINOPHEN 325 MG/1
650 TABLET ORAL
OUTPATIENT
Start: 2023-08-03

## 2023-08-03 RX ORDER — SODIUM CHLORIDE 9 MG/ML
INJECTION, SOLUTION INTRAVENOUS CONTINUOUS
OUTPATIENT
Start: 2023-08-03

## 2023-08-03 RX ORDER — DIPHENHYDRAMINE HYDROCHLORIDE 50 MG/ML
50 INJECTION INTRAMUSCULAR; INTRAVENOUS
OUTPATIENT
Start: 2023-08-03

## 2023-08-03 RX ORDER — FAMOTIDINE 10 MG/ML
20 INJECTION, SOLUTION INTRAVENOUS
OUTPATIENT
Start: 2023-08-03

## 2023-08-03 SDOH — ECONOMIC STABILITY: FOOD INSECURITY: WITHIN THE PAST 12 MONTHS, THE FOOD YOU BOUGHT JUST DIDN'T LAST AND YOU DIDN'T HAVE MONEY TO GET MORE.: NEVER TRUE

## 2023-08-03 SDOH — ECONOMIC STABILITY: FOOD INSECURITY: WITHIN THE PAST 12 MONTHS, YOU WORRIED THAT YOUR FOOD WOULD RUN OUT BEFORE YOU GOT MONEY TO BUY MORE.: NEVER TRUE

## 2023-08-03 SDOH — ECONOMIC STABILITY: INCOME INSECURITY: HOW HARD IS IT FOR YOU TO PAY FOR THE VERY BASICS LIKE FOOD, HOUSING, MEDICAL CARE, AND HEATING?: NOT HARD AT ALL

## 2023-08-03 SDOH — ECONOMIC STABILITY: HOUSING INSECURITY
IN THE LAST 12 MONTHS, WAS THERE A TIME WHEN YOU DID NOT HAVE A STEADY PLACE TO SLEEP OR SLEPT IN A SHELTER (INCLUDING NOW)?: NO

## 2023-08-03 ASSESSMENT — PATIENT HEALTH QUESTIONNAIRE - PHQ9
SUM OF ALL RESPONSES TO PHQ QUESTIONS 1-9: 4
SUM OF ALL RESPONSES TO PHQ9 QUESTIONS 1 & 2: 0
5. POOR APPETITE OR OVEREATING: 0
2. FEELING DOWN, DEPRESSED OR HOPELESS: 0
7. TROUBLE CONCENTRATING ON THINGS, SUCH AS READING THE NEWSPAPER OR WATCHING TELEVISION: 0
1. LITTLE INTEREST OR PLEASURE IN DOING THINGS: 0
SUM OF ALL RESPONSES TO PHQ QUESTIONS 1-9: 4
4. FEELING TIRED OR HAVING LITTLE ENERGY: 2
6. FEELING BAD ABOUT YOURSELF - OR THAT YOU ARE A FAILURE OR HAVE LET YOURSELF OR YOUR FAMILY DOWN: 0
8. MOVING OR SPEAKING SO SLOWLY THAT OTHER PEOPLE COULD HAVE NOTICED. OR THE OPPOSITE, BEING SO FIGETY OR RESTLESS THAT YOU HAVE BEEN MOVING AROUND A LOT MORE THAN USUAL: 0
SUM OF ALL RESPONSES TO PHQ QUESTIONS 1-9: 4
3. TROUBLE FALLING OR STAYING ASLEEP: 2
9. THOUGHTS THAT YOU WOULD BE BETTER OFF DEAD, OR OF HURTING YOURSELF: 0
SUM OF ALL RESPONSES TO PHQ QUESTIONS 1-9: 4
10. IF YOU CHECKED OFF ANY PROBLEMS, HOW DIFFICULT HAVE THESE PROBLEMS MADE IT FOR YOU TO DO YOUR WORK, TAKE CARE OF THINGS AT HOME, OR GET ALONG WITH OTHER PEOPLE: 0

## 2023-08-03 NOTE — PATIENT INSTRUCTIONS
Start flonase. Start benadryl 12.5 mg at bedtime. Use for next 3 nights, then as needed. Get labs done at [facility].  Do NOT need to fast.    Your  Can try dr Mayank Talavera or dr Melina Melgar, both endocrinologists, or any other provider in their office.
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
## 2023-08-03 NOTE — PROGRESS NOTES
1. \"Have you been to the ER, urgent care clinic since your last visit? Hospitalized since your last visit? \" No    2. \"Have you seen or consulted any other health care providers outside of the [address] [place] [address] since your last visit? \" No     3. For patients aged 43-73: Has the patient had a colonoscopy / FIT/ Cologuard? Yes - Care Gap present. Most recent result on file      If the patient is female:    4. For patients aged 43-66: Has the patient had a mammogram within the past 2 years? Yes - Care Gap present. Most recent result on file      5. For patients aged 21-65: Has the patient had a pap smear?  NA - based on age or sex

## 2023-08-06 RX ORDER — SODIUM CHLORIDE 0.9 % (FLUSH) 0.9 %
5-40 SYRINGE (ML) INJECTION PRN
OUTPATIENT
Start: 2023-08-06

## 2023-08-06 RX ORDER — FAMOTIDINE 10 MG/ML
20 INJECTION, SOLUTION INTRAVENOUS
OUTPATIENT
Start: 2023-08-06

## 2023-08-06 RX ORDER — ACETAMINOPHEN 325 MG/1
650 TABLET ORAL
OUTPATIENT
Start: 2023-08-06

## 2023-08-06 RX ORDER — ONDANSETRON 2 MG/ML
8 INJECTION INTRAMUSCULAR; INTRAVENOUS
OUTPATIENT
Start: 2023-08-06

## 2023-08-06 RX ORDER — EPINEPHRINE 1 MG/ML
0.3 INJECTION, SOLUTION, CONCENTRATE INTRAVENOUS PRN
OUTPATIENT
Start: 2023-08-06

## 2023-08-06 RX ORDER — SODIUM CHLORIDE 9 MG/ML
5-250 INJECTION, SOLUTION INTRAVENOUS PRN
OUTPATIENT
Start: 2023-08-06

## 2023-08-06 RX ORDER — SODIUM CHLORIDE 9 MG/ML
INJECTION, SOLUTION INTRAVENOUS CONTINUOUS
OUTPATIENT
Start: 2023-08-06

## 2023-08-06 RX ORDER — ALBUTEROL SULFATE 90 UG/1
4 AEROSOL, METERED RESPIRATORY (INHALATION) PRN
OUTPATIENT
Start: 2023-08-06

## 2023-08-06 RX ORDER — HEPARIN SODIUM (PORCINE) LOCK FLUSH IV SOLN 100 UNIT/ML 100 UNIT/ML
500 SOLUTION INTRAVENOUS PRN
OUTPATIENT
Start: 2023-08-06

## 2023-08-06 RX ORDER — DIPHENHYDRAMINE HYDROCHLORIDE 50 MG/ML
50 INJECTION INTRAMUSCULAR; INTRAVENOUS
OUTPATIENT
Start: 2023-08-06

## 2023-08-06 RX ORDER — ZOLEDRONIC ACID 5 MG/100ML
5 INJECTION, SOLUTION INTRAVENOUS ONCE
OUTPATIENT
Start: 2023-08-06 | End: 2023-08-06

## 2023-08-08 ENCOUNTER — TELEPHONE (OUTPATIENT)
Age: 73
End: 2023-08-08

## 2023-08-08 NOTE — TELEPHONE ENCOUNTER
F/U Regarding visit on:   8/3/2023       Caller states:  Concern:   Did not receive call to schedule infusion for osteopetrosis      Psr provided pt with contact info for [facility]

## 2023-08-09 ENCOUNTER — NURSE ONLY (OUTPATIENT)
Age: 73
End: 2023-08-09

## 2023-08-09 DIAGNOSIS — N18.31 STAGE 3A CHRONIC KIDNEY DISEASE (HCC): ICD-10-CM

## 2023-08-09 DIAGNOSIS — R53.83 OTHER FATIGUE: ICD-10-CM

## 2023-08-09 DIAGNOSIS — I48.0 PAROXYSMAL ATRIAL FIBRILLATION (HCC): ICD-10-CM

## 2023-08-10 LAB
ALBUMIN SERPL-MCNC: 3.3 G/DL (ref 3.5–5)
ALBUMIN/GLOB SERPL: 1 (ref 1.1–2.2)
ALP SERPL-CCNC: 79 U/L (ref 45–117)
ALT SERPL-CCNC: 21 U/L (ref 12–78)
ANION GAP SERPL CALC-SCNC: 4 MMOL/L (ref 5–15)
AST SERPL-CCNC: 22 U/L (ref 15–37)
BASOPHILS # BLD: 0.1 K/UL (ref 0–0.1)
BASOPHILS NFR BLD: 1 % (ref 0–1)
BILIRUB SERPL-MCNC: 0.4 MG/DL (ref 0.2–1)
BUN SERPL-MCNC: 28 MG/DL (ref 6–20)
BUN/CREAT SERPL: 19 (ref 12–20)
CALCIUM SERPL-MCNC: 10.5 MG/DL (ref 8.5–10.1)
CHLORIDE SERPL-SCNC: 107 MMOL/L (ref 97–108)
CO2 SERPL-SCNC: 31 MMOL/L (ref 21–32)
CREAT SERPL-MCNC: 1.45 MG/DL (ref 0.55–1.02)
DIFFERENTIAL METHOD BLD: ABNORMAL
EOSINOPHIL # BLD: 0.2 K/UL (ref 0–0.4)
EOSINOPHIL NFR BLD: 2 % (ref 0–7)
ERYTHROCYTE [DISTWIDTH] IN BLOOD BY AUTOMATED COUNT: 13.6 % (ref 11.5–14.5)
GLOBULIN SER CALC-MCNC: 3.2 G/DL (ref 2–4)
GLUCOSE SERPL-MCNC: 92 MG/DL (ref 65–100)
HCT VFR BLD AUTO: 39.9 % (ref 35–47)
HGB BLD-MCNC: 12.4 G/DL (ref 11.5–16)
IMM GRANULOCYTES # BLD AUTO: 0.1 K/UL (ref 0–0.04)
IMM GRANULOCYTES NFR BLD AUTO: 1 % (ref 0–0.5)
LYMPHOCYTES # BLD: 1.6 K/UL (ref 0.8–3.5)
LYMPHOCYTES NFR BLD: 19 % (ref 12–49)
MCH RBC QN AUTO: 30 PG (ref 26–34)
MCHC RBC AUTO-ENTMCNC: 31.1 G/DL (ref 30–36.5)
MCV RBC AUTO: 96.6 FL (ref 80–99)
MONOCYTES # BLD: 1.1 K/UL (ref 0–1)
MONOCYTES NFR BLD: 13 % (ref 5–13)
NEUTS SEG # BLD: 5.4 K/UL (ref 1.8–8)
NEUTS SEG NFR BLD: 64 % (ref 32–75)
NRBC # BLD: 0 K/UL (ref 0–0.01)
NRBC BLD-RTO: 0 PER 100 WBC
PLATELET # BLD AUTO: 231 K/UL (ref 150–400)
PMV BLD AUTO: 12.1 FL (ref 8.9–12.9)
POTASSIUM SERPL-SCNC: 4.6 MMOL/L (ref 3.5–5.1)
PROT SERPL-MCNC: 6.5 G/DL (ref 6.4–8.2)
RBC # BLD AUTO: 4.13 M/UL (ref 3.8–5.2)
SODIUM SERPL-SCNC: 142 MMOL/L (ref 136–145)
TSH SERPL DL<=0.05 MIU/L-ACNC: 1.76 UIU/ML (ref 0.36–3.74)
WBC # BLD AUTO: 8.5 K/UL (ref 3.6–11)

## 2023-08-29 ENCOUNTER — HOSPITAL ENCOUNTER (OUTPATIENT)
Facility: HOSPITAL | Age: 73
Setting detail: INFUSION SERIES
End: 2023-08-29
Payer: MEDICARE

## 2023-08-29 VITALS
WEIGHT: 293 LBS | RESPIRATION RATE: 16 BRPM | OXYGEN SATURATION: 97 % | TEMPERATURE: 98 F | HEART RATE: 61 BPM | BODY MASS INDEX: 49.95 KG/M2 | DIASTOLIC BLOOD PRESSURE: 68 MMHG | SYSTOLIC BLOOD PRESSURE: 146 MMHG

## 2023-08-29 DIAGNOSIS — M81.0 AGE-RELATED OSTEOPOROSIS WITHOUT CURRENT PATHOLOGICAL FRACTURE: Primary | ICD-10-CM

## 2023-08-29 PROCEDURE — 2580000003 HC RX 258: Performed by: INTERNAL MEDICINE

## 2023-08-29 PROCEDURE — 6360000002 HC RX W HCPCS: Performed by: INTERNAL MEDICINE

## 2023-08-29 PROCEDURE — 96374 THER/PROPH/DIAG INJ IV PUSH: CPT

## 2023-08-29 RX ORDER — EPINEPHRINE 1 MG/ML
0.3 INJECTION, SOLUTION, CONCENTRATE INTRAVENOUS PRN
OUTPATIENT
Start: 2024-08-25

## 2023-08-29 RX ORDER — SODIUM CHLORIDE 9 MG/ML
5-250 INJECTION, SOLUTION INTRAVENOUS PRN
OUTPATIENT
Start: 2024-08-25

## 2023-08-29 RX ORDER — SODIUM CHLORIDE 0.9 % (FLUSH) 0.9 %
5-40 SYRINGE (ML) INJECTION PRN
OUTPATIENT
Start: 2024-08-25

## 2023-08-29 RX ORDER — ACETAMINOPHEN 325 MG/1
650 TABLET ORAL
OUTPATIENT
Start: 2024-08-25

## 2023-08-29 RX ORDER — SODIUM CHLORIDE 0.9 % (FLUSH) 0.9 %
5-40 SYRINGE (ML) INJECTION PRN
Status: DISCONTINUED | OUTPATIENT
Start: 2023-08-29 | End: 2023-08-30 | Stop reason: HOSPADM

## 2023-08-29 RX ORDER — HEPARIN 100 UNIT/ML
500 SYRINGE INTRAVENOUS PRN
OUTPATIENT
Start: 2024-08-25

## 2023-08-29 RX ORDER — ONDANSETRON 2 MG/ML
8 INJECTION INTRAMUSCULAR; INTRAVENOUS
OUTPATIENT
Start: 2024-08-25

## 2023-08-29 RX ORDER — SODIUM CHLORIDE 9 MG/ML
5-250 INJECTION, SOLUTION INTRAVENOUS PRN
Status: DISCONTINUED | OUTPATIENT
Start: 2023-08-29 | End: 2023-08-30 | Stop reason: HOSPADM

## 2023-08-29 RX ORDER — DIPHENHYDRAMINE HYDROCHLORIDE 50 MG/ML
50 INJECTION INTRAMUSCULAR; INTRAVENOUS
OUTPATIENT
Start: 2024-08-25

## 2023-08-29 RX ORDER — ALBUTEROL SULFATE 90 UG/1
4 AEROSOL, METERED RESPIRATORY (INHALATION) PRN
OUTPATIENT
Start: 2024-08-25

## 2023-08-29 RX ORDER — SODIUM CHLORIDE 9 MG/ML
INJECTION, SOLUTION INTRAVENOUS CONTINUOUS
OUTPATIENT
Start: 2024-08-25

## 2023-08-29 RX ORDER — ZOLEDRONIC ACID 5 MG/100ML
5 INJECTION, SOLUTION INTRAVENOUS ONCE
OUTPATIENT
Start: 2024-08-25 | End: 2024-08-25

## 2023-08-29 RX ORDER — ZOLEDRONIC ACID 5 MG/100ML
5 INJECTION, SOLUTION INTRAVENOUS ONCE
Status: COMPLETED | OUTPATIENT
Start: 2023-08-29 | End: 2023-08-29

## 2023-08-29 RX ADMIN — ZOLEDRONIC ACID 5 MG: 0.05 INJECTION, SOLUTION INTRAVENOUS at 11:18

## 2023-08-29 RX ADMIN — SODIUM CHLORIDE, PRESERVATIVE FREE 10 ML: 5 INJECTION INTRAVENOUS at 11:33

## 2023-08-29 RX ADMIN — SODIUM CHLORIDE, PRESERVATIVE FREE 10 ML: 5 INJECTION INTRAVENOUS at 11:15

## 2023-09-22 RX ORDER — ESCITALOPRAM OXALATE 10 MG/1
TABLET ORAL
Qty: 90 TABLET | Refills: 3 | Status: SHIPPED | OUTPATIENT
Start: 2023-09-22

## 2023-09-22 NOTE — TELEPHONE ENCOUNTER
PCP: Bailee Milan,     Last appt: 8/3/2023  Future Appointments   Date Time Provider [address]   11/20/2023  2:20 PM Halima Dennison MD [facility] BS AMB   12/11/2023  1:40 PM Ailyn Durant III, DO [facility] BS AMB   8/27/2024  1:00 PM [address]       Requested Prescriptions     Pending Prescriptions Disp Refills    escitalopram (LEXAPRO) 10 MG tablet [Pharmacy Med Name: ESCITALOPRAM 10MG TABLETS] 90 tablet 0     Sig: TAKE 1 TABLET BY MOUTH DAILY

## 2023-10-24 ENCOUNTER — TELEPHONE (OUTPATIENT)
Age: 73
End: 2023-10-24

## 2023-10-24 NOTE — TELEPHONE ENCOUNTER
----- Message from Ankit Palmer sent at 10/24/2023 12:41 PM EDT -----  Subject: Appointment Request    Reason for Call: Established Patient Appointment needed: Semi-Routine   Cough, Cold Symptoms    QUESTIONS    Reason for appointment request? No appointments available during search     Additional Information for Provider? PATIENT NEEDS APPT FOR COUGH,   CONGESTION. STARTED SATURDAY.  PLEASE CALL PATIENT TO SCHEDULE APPT FOR   SOMETIME WITH WEEK.   ---------------------------------------------------------------------------  --------------  Abena RODAS  [phone]; OK to leave message on voicemail  ---------------------------------------------------------------------------  --------------  SCRIPT ANSWERS

## 2023-10-24 NOTE — TELEPHONE ENCOUNTER
----- Message from Alban Dubois sent at 10/24/2023 12:41 PM EDT -----  Subject: Appointment Request    Reason for Call: Established Patient Appointment needed: Semi-Routine   Cough, Cold Symptoms    QUESTIONS    Reason for appointment request? No appointments available during search     Additional Information for Provider? PATIENT NEEDS APPT FOR COUGH,   CONGESTION. STARTED SATURDAY.  PLEASE CALL PATIENT TO SCHEDULE APPT FOR   SOMETIME WITH WEEK.   ---------------------------------------------------------------------------  --------------  Scarlet VYAS  [phone]; OK to leave message on voicemail  ---------------------------------------------------------------------------  --------------  SCRIPT ANSWERS

## 2023-10-25 ENCOUNTER — OFFICE VISIT (OUTPATIENT)
Age: 73
End: 2023-10-25

## 2023-10-25 VITALS
DIASTOLIC BLOOD PRESSURE: 77 MMHG | BODY MASS INDEX: 49.87 KG/M2 | WEIGHT: 293 LBS | SYSTOLIC BLOOD PRESSURE: 180 MMHG | HEART RATE: 64 BPM | RESPIRATION RATE: 22 BRPM | OXYGEN SATURATION: 96 % | TEMPERATURE: 97.5 F

## 2023-10-25 DIAGNOSIS — U07.1 COVID-19: Primary | ICD-10-CM

## 2023-10-25 RX ORDER — NIRMATRELVIR AND RITONAVIR 150-100 MG
KIT ORAL
Qty: 20 TABLET | Refills: 0 | Status: SHIPPED | OUTPATIENT
Start: 2023-10-25 | End: 2023-10-30

## 2023-10-25 NOTE — PROGRESS NOTES
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    TEGAN on CPAP 8/16/2017    PAF (paroxysmal atrial fibrillation) ([address]) 8/8/2017    Psychiatric disorder     anxiety and depression    Retinal tear of right eye 8/8/2017    Comments: repaired with laser    Unspecified sleep apnea     uses cpap    Vitamin D deficiency 8/8/2017      Social History     Socioeconomic History    Marital status:      Spouse name: None    Number of children: None    Years of education: None    Highest education level: None   Tobacco Use    Smoking status: Never    Smokeless tobacco: Never   Substance and Sexual Activity    Alcohol use: No    Drug use: No     Social Determinants of Health     Financial Resource Strain: Low Risk  (8/3/2023)    Overall Financial Resource Strain (CARDIA)     Difficulty of Paying Living Expenses: Not hard at all   Food Insecurity: No Food Insecurity (8/3/2023)    Hunger Vital Sign     Worried About Running Out of Food in the Last Year: Never true     Ran Out of Food in the Last Year: Never true   Transportation Needs: Unknown (8/3/2023)    PRAPARE - Transportation     Lack of Transportation (Non-Medical): No   Housing Stability: Unknown (8/3/2023)    Housing Stability Vital Sign     Unstable Housing in the Last Year: No          Current Outpatient Medications   Medication Sig    nirmatrelvir/ritonavir (PAXLOVID, 150/100,) 10 x 150 MG & 10 x 100MG TBPK Take 2 tablets (one 150 mg nirmatrelvir and one 100 mg ritonavir tablets) by mouth every 12 hours for 5 days.     escitalopram (LEXAPRO) 10 MG tablet TAKE 1 TABLET BY MOUTH DAILY    aspirin 81 MG EC tablet Take 1 tablet by mouth daily    bumetanide (BUMEX) 1 MG tablet Take 1 tablet by mouth as needed (Patient not taking: Reported on 10/25/2023)    calcium carb-cholecalciferol 600-20 MG-MCG TABS Take 1 tablet by mouth daily    cyanocobalamin 100 MCG tablet as directed Orally

## 2023-11-22 RX ORDER — SIMVASTATIN 20 MG
TABLET ORAL
Qty: 90 TABLET | Refills: 3 | Status: SHIPPED | OUTPATIENT
Start: 2023-11-22

## 2023-12-11 ENCOUNTER — OFFICE VISIT (OUTPATIENT)
Age: 73
End: 2023-12-11
Payer: MEDICARE

## 2023-12-11 VITALS
TEMPERATURE: 98.2 F | RESPIRATION RATE: 16 BRPM | HEIGHT: 66 IN | SYSTOLIC BLOOD PRESSURE: 116 MMHG | WEIGHT: 293 LBS | OXYGEN SATURATION: 93 % | HEART RATE: 60 BPM | DIASTOLIC BLOOD PRESSURE: 66 MMHG | BODY MASS INDEX: 47.09 KG/M2

## 2023-12-11 DIAGNOSIS — Z23 NEEDS FLU SHOT: ICD-10-CM

## 2023-12-11 DIAGNOSIS — N18.31 STAGE 3A CHRONIC KIDNEY DISEASE (HCC): ICD-10-CM

## 2023-12-11 DIAGNOSIS — F41.1 GENERALIZED ANXIETY DISORDER: ICD-10-CM

## 2023-12-11 DIAGNOSIS — E78.2 MIXED HYPERLIPIDEMIA: ICD-10-CM

## 2023-12-11 DIAGNOSIS — G47.33 OSA ON CPAP: ICD-10-CM

## 2023-12-11 DIAGNOSIS — Z00.00 MEDICARE ANNUAL WELLNESS VISIT, SUBSEQUENT: Primary | ICD-10-CM

## 2023-12-11 DIAGNOSIS — M81.0 AGE-RELATED OSTEOPOROSIS WITHOUT CURRENT PATHOLOGICAL FRACTURE: ICD-10-CM

## 2023-12-11 DIAGNOSIS — E53.8 B12 DEFICIENCY: ICD-10-CM

## 2023-12-11 DIAGNOSIS — R73.03 PREDIABETES: ICD-10-CM

## 2023-12-11 DIAGNOSIS — I89.0 LYMPHEDEMA, NOT ELSEWHERE CLASSIFIED: ICD-10-CM

## 2023-12-11 DIAGNOSIS — I48.0 PAROXYSMAL ATRIAL FIBRILLATION (HCC): ICD-10-CM

## 2023-12-11 PROCEDURE — 1123F ACP DISCUSS/DSCN MKR DOCD: CPT | Performed by: INTERNAL MEDICINE

## 2023-12-11 PROCEDURE — 1090F PRES/ABSN URINE INCON ASSESS: CPT | Performed by: INTERNAL MEDICINE

## 2023-12-11 PROCEDURE — G8399 PT W/DXA RESULTS DOCUMENT: HCPCS | Performed by: INTERNAL MEDICINE

## 2023-12-11 PROCEDURE — 3078F DIAST BP <80 MM HG: CPT | Performed by: INTERNAL MEDICINE

## 2023-12-11 PROCEDURE — 99214 OFFICE O/P EST MOD 30 MIN: CPT | Performed by: INTERNAL MEDICINE

## 2023-12-11 PROCEDURE — PBSHW INFLUENZA, FLUAD, (AGE 65 Y+), IM, PF, 0.5 ML: Performed by: INTERNAL MEDICINE

## 2023-12-11 PROCEDURE — 3017F COLORECTAL CA SCREEN DOC REV: CPT | Performed by: INTERNAL MEDICINE

## 2023-12-11 PROCEDURE — G8484 FLU IMMUNIZE NO ADMIN: HCPCS | Performed by: INTERNAL MEDICINE

## 2023-12-11 PROCEDURE — G8427 DOCREV CUR MEDS BY ELIG CLIN: HCPCS | Performed by: INTERNAL MEDICINE

## 2023-12-11 PROCEDURE — 1036F TOBACCO NON-USER: CPT | Performed by: INTERNAL MEDICINE

## 2023-12-11 PROCEDURE — G8417 CALC BMI ABV UP PARAM F/U: HCPCS | Performed by: INTERNAL MEDICINE

## 2023-12-11 PROCEDURE — G0439 PPPS, SUBSEQ VISIT: HCPCS | Performed by: INTERNAL MEDICINE

## 2023-12-11 PROCEDURE — 3074F SYST BP LT 130 MM HG: CPT | Performed by: INTERNAL MEDICINE

## 2023-12-11 PROCEDURE — 90694 VACC AIIV4 NO PRSRV 0.5ML IM: CPT | Performed by: INTERNAL MEDICINE

## 2023-12-11 ASSESSMENT — PATIENT HEALTH QUESTIONNAIRE - PHQ9
SUM OF ALL RESPONSES TO PHQ QUESTIONS 1-9: 2
1. LITTLE INTEREST OR PLEASURE IN DOING THINGS: 1
2. FEELING DOWN, DEPRESSED OR HOPELESS: 1
SUM OF ALL RESPONSES TO PHQ QUESTIONS 1-9: 2
SUM OF ALL RESPONSES TO PHQ9 QUESTIONS 1 & 2: 2

## 2023-12-11 ASSESSMENT — LIFESTYLE VARIABLES
HOW MANY STANDARD DRINKS CONTAINING ALCOHOL DO YOU HAVE ON A TYPICAL DAY: PATIENT DOES NOT DRINK
HOW OFTEN DO YOU HAVE A DRINK CONTAINING ALCOHOL: NEVER

## 2023-12-11 NOTE — PROGRESS NOTES
1. \"Have you been to the ER, urgent care clinic since your last visit? Hospitalized since your last visit? Urgent care, Positive Covid, [facility]. 2. \"Have you seen or consulted any other health care providers outside of the [address] [place] [address] since your last visit? yes    3. For patients aged 43-73: Has the patient had a colonoscopy / FIT/ Cologuard? No      If the patient is female:    4. For patients aged 43-66: Has the patient had a mammogram within the past 2 years? No      5. For patients aged 21-65: Has the patient had a pap smear?  NA - based on age or sex
After obtaining consent, and per verbal order from Dr. Chavarria Midvale , patient received influenza vaccine given by Dasia Emerson in left Deltoid. Influenza Vaccine 0.5 mL IM now. Patient was observed for 10 minutes post injection. Patient tolerated injection well.        Dasia Emerson LPN
Team:  Jany Dowell DO as PCP - General  Jany Dowell DO as PCP - Empaneled Provider     Reviewed and updated this visit:  Tobacco  Allergies  Meds  Med Hx  Surg Hx  Soc Hx  Fam Hx         Pepito Akbar is a 75 y.o. female who presents for evaluation of AWV. Last seen by me aug 3, 2023. Weight stable since then. Had covid back in October, had a tough time getting seen. Her virtual visit opportunity with dr Aurelia Rodas was a disaster. She has also had poor service when attempting to leave messages with me. She has been contemplating leaving for a different practice due to the above situations. Overall, her health is stable and relatively good now.       ROS:  Constitutional: negative for fevers, chills, anorexia and weight loss  Eyes:   negative for visual disturbance and irritation  ENT:   negative for tinnitus,sore throat,nasal congestion,ear pain,hoarseness  Respiratory:  negative for cough, hemoptysis, dyspnea,wheezing  CV:   negative for chest pain, palpitations, lower extremity edema  GI:   negative for nausea, vomiting, diarrhea, abdominal pain,melena  Genitourinary: negative for frequency, dysuria and hematuria  Musculoskel: negative for myalgias, arthralgias, back pain, muscle weakness, joint pain  Neurological:  negative for headaches, dizziness, focal weakness, numbness  Psychiatric:     Negative for depression or anxiety      Past Medical History:   Diagnosis Date    Arthritis     Bilateral cataracts 8/8/2017    Comments: minimal    CAD (coronary artery disease) 8/8/2017    Story: by EBT    Chronic kidney disease     kidney stone    CKD (chronic kidney disease) stage 2, GFR 60-89 ml/min 8/16/2017    Depression 8/8/2017    Story: OCD    DJD (degenerative joint disease) 8/8/2017    Story: knees/feet    Edema extremities 8/8/2017    Family history of colon cancer 8/8/2017    GERD with stricture 8/8/2017    Hair loss 8/8/2017    Hematoma 8/8/2017    Comments: Infected

## 2023-12-12 LAB
ANION GAP SERPL CALC-SCNC: 6 MMOL/L (ref 5–15)
BUN SERPL-MCNC: 36 MG/DL (ref 6–20)
BUN/CREAT SERPL: 26 (ref 12–20)
CALCIUM SERPL-MCNC: 9.5 MG/DL (ref 8.5–10.1)
CHLORIDE SERPL-SCNC: 108 MMOL/L (ref 97–108)
CHOLEST SERPL-MCNC: 185 MG/DL
CO2 SERPL-SCNC: 28 MMOL/L (ref 21–32)
CREAT SERPL-MCNC: 1.38 MG/DL (ref 0.55–1.02)
EST. AVERAGE GLUCOSE BLD GHB EST-MCNC: 114 MG/DL
GLUCOSE SERPL-MCNC: 100 MG/DL (ref 65–100)
HBA1C MFR BLD: 5.6 % (ref 4–5.6)
HDLC SERPL-MCNC: 75 MG/DL
HDLC SERPL: 2.5 (ref 0–5)
LDLC SERPL CALC-MCNC: 77.8 MG/DL (ref 0–100)
POTASSIUM SERPL-SCNC: 4.5 MMOL/L (ref 3.5–5.1)
SODIUM SERPL-SCNC: 142 MMOL/L (ref 136–145)
TRIGL SERPL-MCNC: 161 MG/DL
VIT B12 SERPL-MCNC: 1756 PG/ML (ref 193–986)
VLDLC SERPL CALC-MCNC: 32.2 MG/DL

## 2023-12-15 NOTE — RESULT ENCOUNTER NOTE
Called, No answer, LVM for patient to give us a return call. Lab results per. Manuel Juarez    See message below. B12 level is quite elevated. Would adjust pill to every other day, or 1/2 pill daily. Other labs look good and stable. Continue other meds as before. Patient verbalized understanding of information discussed with no further questions at this time.      Raffi Hutchins LPN

## 2023-12-18 ENCOUNTER — TELEPHONE (OUTPATIENT)
Age: 73
End: 2023-12-18

## 2023-12-18 NOTE — TELEPHONE ENCOUNTER
----- Message from Laure Arsenio sent at 12/18/2023  3:17 PM EST -----  Subject: Message to Provider    QUESTIONS  Information for Provider? Patient is calling in to speak to Rossana Burrell per provider to voice her concerns in how she is being treated/   taken care of Please contact   ---------------------------------------------------------------------------  --------------  CALL BACK INFO  [phone]; OK to leave message on voicemail  ---------------------------------------------------------------------------  --------------  SCRIPT ANSWERS  Relationship to Patient? Self

## 2023-12-20 ENCOUNTER — TELEPHONE (OUTPATIENT)
Age: 73
End: 2023-12-20

## 2023-12-20 NOTE — TELEPHONE ENCOUNTER
----- Message from Hannah Cancer sent at 12/19/2023  4:29 PM EST -----  Subject: Message to Provider    QUESTIONS  Information for Provider? Patient is returning a phone call. She has put a   couple messages in about calling back. This was for test results that they   were calling about. No one has ever called her back.  Please call to   advise.   ---------------------------------------------------------------------------  --------------  Siddhatrha [facility] INFO  [phone]; OK to leave message on voicemail  ---------------------------------------------------------------------------  --------------  SCRIPT ANSWERS  undefined

## 2023-12-26 NOTE — TELEPHONE ENCOUNTER
Patient notified of results and response from Mayuri Laurent DO    States understanding      Patient asked for med list to be updated    Centrum Silver Womens 50+ takes 2 tablets daily   Calcium Carb with Vit D - increased to 2 tablets daily       Patient also inquired about Occult Blood sample results that she sent to [facility] on 2023   Specimen was discontinued as it was in an  container  Will ask PCP if he wants to repeat

## 2023-12-27 DIAGNOSIS — Z12.11 SCREEN FOR COLON CANCER: Primary | ICD-10-CM

## 2024-02-20 RX ORDER — LISINOPRIL 20 MG/1
TABLET ORAL
Qty: 180 TABLET | Refills: 3 | Status: SHIPPED | OUTPATIENT
Start: 2024-02-20

## 2024-02-20 RX ORDER — METOPROLOL TARTRATE 50 MG/1
TABLET, FILM COATED ORAL
Qty: 180 TABLET | Refills: 3 | Status: SHIPPED | OUTPATIENT
Start: 2024-02-20

## 2024-02-26 RX ORDER — HYDROCHLOROTHIAZIDE 12.5 MG/1
12.5 TABLET ORAL DAILY
Qty: 90 TABLET | Refills: 3 | Status: SHIPPED | OUTPATIENT
Start: 2024-02-26

## 2024-05-24 LAB — HEMOCCULT STL QL IA: NEGATIVE

## 2024-06-05 ENCOUNTER — TELEPHONE (OUTPATIENT)
Age: 74
End: 2024-06-05

## 2024-07-01 ENCOUNTER — OFFICE VISIT (OUTPATIENT)
Age: 74
End: 2024-07-01
Payer: MEDICARE

## 2024-07-01 VITALS
SYSTOLIC BLOOD PRESSURE: 119 MMHG | RESPIRATION RATE: 16 BRPM | BODY MASS INDEX: 49.52 KG/M2 | HEART RATE: 60 BPM | HEIGHT: 66 IN | TEMPERATURE: 97.8 F | DIASTOLIC BLOOD PRESSURE: 77 MMHG | OXYGEN SATURATION: 93 %

## 2024-07-01 DIAGNOSIS — I48.0 PAROXYSMAL ATRIAL FIBRILLATION (HCC): ICD-10-CM

## 2024-07-01 DIAGNOSIS — F33.0 MAJOR DEPRESSIVE DISORDER, RECURRENT, MILD (HCC): ICD-10-CM

## 2024-07-01 DIAGNOSIS — R73.03 PREDIABETES: ICD-10-CM

## 2024-07-01 DIAGNOSIS — E55.9 VITAMIN D DEFICIENCY: ICD-10-CM

## 2024-07-01 DIAGNOSIS — Z00.00 MEDICARE ANNUAL WELLNESS VISIT, SUBSEQUENT: Primary | ICD-10-CM

## 2024-07-01 DIAGNOSIS — E53.8 B12 DEFICIENCY: ICD-10-CM

## 2024-07-01 DIAGNOSIS — N18.31 STAGE 3A CHRONIC KIDNEY DISEASE (HCC): ICD-10-CM

## 2024-07-01 DIAGNOSIS — E78.2 MIXED HYPERLIPIDEMIA: ICD-10-CM

## 2024-07-01 PROCEDURE — 3078F DIAST BP <80 MM HG: CPT | Performed by: INTERNAL MEDICINE

## 2024-07-01 PROCEDURE — 1123F ACP DISCUSS/DSCN MKR DOCD: CPT | Performed by: INTERNAL MEDICINE

## 2024-07-01 PROCEDURE — G0439 PPPS, SUBSEQ VISIT: HCPCS | Performed by: INTERNAL MEDICINE

## 2024-07-01 PROCEDURE — 99213 OFFICE O/P EST LOW 20 MIN: CPT | Performed by: INTERNAL MEDICINE

## 2024-07-01 PROCEDURE — 3074F SYST BP LT 130 MM HG: CPT | Performed by: INTERNAL MEDICINE

## 2024-07-01 PROCEDURE — 3017F COLORECTAL CA SCREEN DOC REV: CPT | Performed by: INTERNAL MEDICINE

## 2024-07-01 RX ORDER — ESCITALOPRAM OXALATE 20 MG/1
20 TABLET ORAL DAILY
Qty: 90 TABLET | Refills: 3 | Status: SHIPPED | OUTPATIENT
Start: 2024-07-01

## 2024-07-01 ASSESSMENT — PATIENT HEALTH QUESTIONNAIRE - PHQ9
10. IF YOU CHECKED OFF ANY PROBLEMS, HOW DIFFICULT HAVE THESE PROBLEMS MADE IT FOR YOU TO DO YOUR WORK, TAKE CARE OF THINGS AT HOME, OR GET ALONG WITH OTHER PEOPLE: NOT DIFFICULT AT ALL
1. LITTLE INTEREST OR PLEASURE IN DOING THINGS: NOT AT ALL
SUM OF ALL RESPONSES TO PHQ QUESTIONS 1-9: 0
SUM OF ALL RESPONSES TO PHQ QUESTIONS 1-9: 0
4. FEELING TIRED OR HAVING LITTLE ENERGY: NOT AT ALL
SUM OF ALL RESPONSES TO PHQ9 QUESTIONS 1 & 2: 0
6. FEELING BAD ABOUT YOURSELF - OR THAT YOU ARE A FAILURE OR HAVE LET YOURSELF OR YOUR FAMILY DOWN: NOT AT ALL
5. POOR APPETITE OR OVEREATING: NOT AT ALL
8. MOVING OR SPEAKING SO SLOWLY THAT OTHER PEOPLE COULD HAVE NOTICED. OR THE OPPOSITE, BEING SO FIGETY OR RESTLESS THAT YOU HAVE BEEN MOVING AROUND A LOT MORE THAN USUAL: NOT AT ALL
9. THOUGHTS THAT YOU WOULD BE BETTER OFF DEAD, OR OF HURTING YOURSELF: NOT AT ALL
SUM OF ALL RESPONSES TO PHQ QUESTIONS 1-9: 0
SUM OF ALL RESPONSES TO PHQ QUESTIONS 1-9: 0
3. TROUBLE FALLING OR STAYING ASLEEP: NOT AT ALL
2. FEELING DOWN, DEPRESSED OR HOPELESS: NOT AT ALL
7. TROUBLE CONCENTRATING ON THINGS, SUCH AS READING THE NEWSPAPER OR WATCHING TELEVISION: NOT AT ALL

## 2024-07-01 ASSESSMENT — LIFESTYLE VARIABLES
HOW OFTEN DO YOU HAVE A DRINK CONTAINING ALCOHOL: NEVER
HOW MANY STANDARD DRINKS CONTAINING ALCOHOL DO YOU HAVE ON A TYPICAL DAY: PATIENT DOES NOT DRINK

## 2024-07-01 NOTE — PROGRESS NOTES
\"Have you been to the ER, urgent care clinic since your last visit?  Hospitalized since your last visit?\"    NO    “Have you seen or consulted any other health care providers outside of [facility] since your last visit?”    NO            Click Here for Release of Records Request  
interventions or treatment  Do you eat balanced/healthy meals regularly Interventions:  Patient declines any further evaluation or treatment  Obesity Interventions:  Patient declines any further evaluation or treatment              Vision Screen:  Do you have difficulty driving, watching TV, or doing any of your daily activities because of your eyesight?: No  Have you had an eye exam within the past year?: (!) No  No results found.    Interventions:   Patient encouraged to make appointment with their eye specialist    Safety:  Do you have any tripping hazards - loose or unsecured carpets or rugs?: (!) Yes  Do you have non-slip mats or non-slip surfaces or shower bars or grab bars in your shower or bathtub?: (!) No  Interventions:  Patient declined any further interventions or treatment     Advanced Directives:  Do you have a Living Will?: (!) No    Intervention:  has NO advanced directive - information provided                     Objective   Vitals:    07/01/24 1606   BP: 119/77   Site: Left Upper Arm   Position: Sitting   Cuff Size: Large Adult   Pulse: 60   Resp: 16   Temp: 97.8 °F (36.6 °C)   TempSrc: Temporal   SpO2: 93%   Height: 1.676 m (5' 6\")      Body mass index is 49.52 kg/m².               No Known Allergies  Prior to Visit Medications    Medication Sig Taking? Authorizing Provider   hydroCHLOROthiazide 12.5 MG tablet TAKE 1 TABLET BY MOUTH DAILY Yes Fausto Collado III, DO   lisinopril (PRINIVIL;ZESTRIL) 20 MG tablet TAKE 2 TABLETS BY MOUTH DAILY  Patient taking differently: Take 1 tablet by mouth daily 2 TABLETS DAILY Yes Fausto Collado III, DO   metoprolol tartrate (LOPRESSOR) 50 MG tablet TAKE 1 TABLET BY MOUTH TWICE DAILY Yes Fausto Collado III, DO   Multiple Vitamins-Minerals (CENTRUM SILVER 50+WOMEN) TABS Take 2 tablets by mouth daily Yes Provider, MD Harlan   simvastatin (ZOCOR) 20 MG tablet TAKE 1 TABLET BY MOUTH EVERY EVENING FOR CHOLESTEROL Yes Fausto Collado

## 2024-07-01 NOTE — ADDENDUM NOTE
Addended by: LENNY RICO III on: 7/1/2024 05:24 PM     Modules accepted: Orders, Level of Service

## 2024-07-01 NOTE — PATIENT INSTRUCTIONS
have questions, ask your doctor about seeing a registered dietitian or an exercise specialist. You might also think about joining a weight-loss support group.  If you're not ready to make changes right now, try to pick a date in the future. Then make an appointment with your doctor to talk about when and how you'll get started with a plan.  Follow-up care is a key part of your treatment and safety. Be sure to make and go to all appointments, and call your doctor if you are having problems. It's also a good idea to know your test results and keep a list of the medicines you take.  How can you care for yourself at home?  Set realistic goals. Many people expect to lose much more weight than is likely. A weight loss of 5% to 10% of your body weight may be enough to improve your health.  Get family and friends involved to provide support. Talk to them about why you are trying to lose weight, and ask them to help. They can help by participating in exercise and having meals with you, even if they may be eating something different.  Find what works best for you. If you do not have time or do not like to cook, a program that offers meal replacement bars or shakes may be better for you. Or if you like to prepare meals, finding a plan that includes daily menus and recipes may be best.  Ask your doctor about other health professionals who can help you achieve your weight loss goals.  A dietitian can help you make healthy changes in your diet.  An exercise specialist or  can help you develop a safe and effective exercise program.  A counselor or psychiatrist can help you cope with issues such as depression, anxiety, or family problems that can make it hard to focus on weight loss.  Consider joining a support group for people who are trying to lose weight. Your doctor can suggest groups in your area.  Where can you learn more?  Go to https://www.healthwise.net/patientEd and enter U357 to learn more about \"Starting

## 2024-07-02 LAB
25(OH)D3 SERPL-MCNC: 64.5 NG/ML (ref 30–100)
ALBUMIN SERPL-MCNC: 3.9 G/DL (ref 3.5–5)
ALBUMIN/GLOB SERPL: 1.2 (ref 1.1–2.2)
ALP SERPL-CCNC: 81 U/L (ref 45–117)
ALT SERPL-CCNC: 18 U/L (ref 12–78)
ANION GAP SERPL CALC-SCNC: 8 MMOL/L (ref 5–15)
AST SERPL-CCNC: 25 U/L (ref 15–37)
BASOPHILS # BLD: 0.1 K/UL (ref 0–0.1)
BASOPHILS NFR BLD: 1 % (ref 0–1)
BILIRUB SERPL-MCNC: 0.7 MG/DL (ref 0.2–1)
BUN SERPL-MCNC: 39 MG/DL (ref 6–20)
BUN/CREAT SERPL: 27 (ref 12–20)
CALCIUM SERPL-MCNC: 11 MG/DL (ref 8.5–10.1)
CHLORIDE SERPL-SCNC: 104 MMOL/L (ref 97–108)
CHOLEST SERPL-MCNC: 190 MG/DL
CO2 SERPL-SCNC: 29 MMOL/L (ref 21–32)
CREAT SERPL-MCNC: 1.44 MG/DL (ref 0.55–1.02)
DIFFERENTIAL METHOD BLD: NORMAL
EOSINOPHIL # BLD: 0.2 K/UL (ref 0–0.4)
EOSINOPHIL NFR BLD: 3 % (ref 0–7)
ERYTHROCYTE [DISTWIDTH] IN BLOOD BY AUTOMATED COUNT: 13.3 % (ref 11.5–14.5)
EST. AVERAGE GLUCOSE BLD GHB EST-MCNC: 117 MG/DL
GLOBULIN SER CALC-MCNC: 3.3 G/DL (ref 2–4)
GLUCOSE SERPL-MCNC: 105 MG/DL (ref 65–100)
HBA1C MFR BLD: 5.7 % (ref 4–5.6)
HCT VFR BLD AUTO: 43.3 % (ref 35–47)
HDLC SERPL-MCNC: 69 MG/DL
HDLC SERPL: 2.8 (ref 0–5)
HGB BLD-MCNC: 13.8 G/DL (ref 11.5–16)
IMM GRANULOCYTES # BLD AUTO: 0 K/UL (ref 0–0.04)
IMM GRANULOCYTES NFR BLD AUTO: 0 % (ref 0–0.5)
LDLC SERPL CALC-MCNC: 87.8 MG/DL (ref 0–100)
LYMPHOCYTES # BLD: 1.6 K/UL (ref 0.8–3.5)
LYMPHOCYTES NFR BLD: 22 % (ref 12–49)
MCH RBC QN AUTO: 30 PG (ref 26–34)
MCHC RBC AUTO-ENTMCNC: 31.9 G/DL (ref 30–36.5)
MCV RBC AUTO: 94.1 FL (ref 80–99)
MONOCYTES # BLD: 0.9 K/UL (ref 0–1)
MONOCYTES NFR BLD: 12 % (ref 5–13)
NEUTS SEG # BLD: 4.3 K/UL (ref 1.8–8)
NEUTS SEG NFR BLD: 62 % (ref 32–75)
NRBC # BLD: 0 K/UL (ref 0–0.01)
NRBC BLD-RTO: 0 PER 100 WBC
PLATELET # BLD AUTO: 184 K/UL (ref 150–400)
PLATELET COMMENT: NORMAL
POTASSIUM SERPL-SCNC: 5.1 MMOL/L (ref 3.5–5.1)
PROT SERPL-MCNC: 7.2 G/DL (ref 6.4–8.2)
RBC # BLD AUTO: 4.6 M/UL (ref 3.8–5.2)
RBC MORPH BLD: NORMAL
SODIUM SERPL-SCNC: 141 MMOL/L (ref 136–145)
TRIGL SERPL-MCNC: 166 MG/DL
TSH SERPL DL<=0.05 MIU/L-ACNC: 1.73 UIU/ML (ref 0.36–3.74)
VIT B12 SERPL-MCNC: 1024 PG/ML (ref 193–986)
VLDLC SERPL CALC-MCNC: 33.2 MG/DL
WBC # BLD AUTO: 7.1 K/UL (ref 3.6–11)

## 2024-07-25 DIAGNOSIS — F33.0 MAJOR DEPRESSIVE DISORDER, RECURRENT, MILD (HCC): Primary | ICD-10-CM

## 2024-07-25 NOTE — TELEPHONE ENCOUNTER
Pt would like to take 10 mg twice daily    She would like the script rewritten to reflect this.    She still needs the 20 mg total a day, but feels better taking twice a day, just 10 mg X 2    Please call into [facility] #[phone]     Lexapro

## 2024-07-26 RX ORDER — ESCITALOPRAM OXALATE 10 MG/1
10 TABLET ORAL 2 TIMES DAILY
Qty: 180 TABLET | Refills: 3 | Status: SHIPPED | OUTPATIENT
Start: 2024-07-26

## 2024-07-26 NOTE — TELEPHONE ENCOUNTER
PCP: Fausto Collado III,     Last appt: 7/1/2024  Future Appointments   Date Time Provider Department Center   8/27/2024  1:00 PM [facility] INF CHAIR 5 [facility]   12/13/2024  1:40 PM Fausto Collado III,  [facility]       Requested Prescriptions     Pending Prescriptions Disp Refills    escitalopram (LEXAPRO) 10 MG tablet 180 tablet 1     Sig: Take 1 tablet by mouth 2 times daily Patient needs split dose to tolerate 20 mg daily

## 2024-08-05 DIAGNOSIS — F33.0 MAJOR DEPRESSIVE DISORDER, RECURRENT, MILD (HCC): ICD-10-CM

## 2024-08-05 RX ORDER — ESCITALOPRAM OXALATE 10 MG/1
20 TABLET ORAL DAILY
Qty: 180 TABLET | Refills: 3 | Status: SHIPPED | OUTPATIENT
Start: 2024-08-05

## 2024-08-05 NOTE — TELEPHONE ENCOUNTER
Pt would like to have med changed and insurance rejected it.  Requested 10 mg 2/day    She has to go back to what she used to take.  They will only pay for 20 mg and she will have to cut this.    Please send a new script for the escitalopram (LEXAPRO) 10 MG tablet    Please send to [facility] #[phone] [place]    Pt is out of med and would like to get this today.  Thanks.

## 2024-08-05 NOTE — TELEPHONE ENCOUNTER
PCP: Fausto Collado III,     Last appt: 7/1/2024  Future Appointments   Date Time Provider Department Center   8/27/2024  1:00 PM [facility] INF CHAIR 5 [facility]   12/13/2024  1:40 PM Fausto Collado III,  [facility] ECC DEP       Requested Prescriptions     Pending Prescriptions Disp Refills    escitalopram (LEXAPRO) 10 MG tablet  3     Sig: Take 2 tablets by mouth daily Patient needs split dose to tolerate 20 mg daily

## 2024-08-07 RX ORDER — ESCITALOPRAM OXALATE 20 MG/1
20 TABLET ORAL DAILY
Qty: 30 TABLET | Refills: 0 | Status: SHIPPED | OUTPATIENT
Start: 2024-08-07

## 2024-08-07 NOTE — TELEPHONE ENCOUNTER
PCP: Fausto Collado III,     Last appt: 7/1/2024   Future Appointments   Date Time Provider Department Center   8/27/2024  1:00 PM [facility] INF CHAIR 5 [facility]   12/13/2024  1:40 PM Fausto Collado III,  [facility] ECC DEP       Requested Prescriptions     Pending Prescriptions Disp Refills    escitalopram (LEXAPRO) 20 MG tablet 30 tablet 0     Sig: Take 1 tablet by mouth daily

## 2024-08-07 NOTE — TELEPHONE ENCOUNTER
Pt states that she is out of the medication, Escitalopram HAS TO BE 20 mg.      This is the only way the insurance will pay.      Please resend again to [facility] #[phone]      Pt states this needs to be done today as she is out of medication.

## 2024-08-20 DIAGNOSIS — M81.0 AGE-RELATED OSTEOPOROSIS WITHOUT CURRENT PATHOLOGICAL FRACTURE: Primary | ICD-10-CM

## 2024-08-20 RX ORDER — ACETAMINOPHEN 325 MG/1
650 TABLET ORAL
OUTPATIENT
Start: 2024-08-25

## 2024-08-20 RX ORDER — SODIUM CHLORIDE 9 MG/ML
INJECTION, SOLUTION INTRAVENOUS CONTINUOUS
OUTPATIENT
Start: 2024-08-25

## 2024-08-20 RX ORDER — FAMOTIDINE 10 MG/ML
20 INJECTION, SOLUTION INTRAVENOUS
OUTPATIENT
Start: 2024-08-25

## 2024-08-20 RX ORDER — SODIUM CHLORIDE 9 MG/ML
5-250 INJECTION, SOLUTION INTRAVENOUS PRN
OUTPATIENT
Start: 2024-08-25

## 2024-08-20 RX ORDER — SODIUM CHLORIDE 0.9 % (FLUSH) 0.9 %
5-40 SYRINGE (ML) INJECTION PRN
OUTPATIENT
Start: 2024-08-25

## 2024-08-20 RX ORDER — ONDANSETRON 2 MG/ML
8 INJECTION INTRAMUSCULAR; INTRAVENOUS
OUTPATIENT
Start: 2024-08-25

## 2024-08-20 RX ORDER — ALBUTEROL SULFATE 90 UG/1
4 AEROSOL, METERED RESPIRATORY (INHALATION) PRN
OUTPATIENT
Start: 2024-08-25

## 2024-08-20 RX ORDER — EPINEPHRINE 1 MG/ML
0.3 INJECTION, SOLUTION, CONCENTRATE INTRAVENOUS PRN
OUTPATIENT
Start: 2024-08-25

## 2024-08-20 RX ORDER — HEPARIN SODIUM (PORCINE) LOCK FLUSH IV SOLN 100 UNIT/ML 100 UNIT/ML
500 SOLUTION INTRAVENOUS PRN
OUTPATIENT
Start: 2024-08-25

## 2024-08-20 RX ORDER — DIPHENHYDRAMINE HYDROCHLORIDE 50 MG/ML
50 INJECTION INTRAMUSCULAR; INTRAVENOUS
OUTPATIENT
Start: 2024-08-25

## 2024-08-20 RX ORDER — ZOLEDRONIC ACID 5 MG/100ML
5 INJECTION, SOLUTION INTRAVENOUS ONCE
OUTPATIENT
Start: 2024-08-25 | End: 2024-08-25

## 2024-09-04 ENCOUNTER — TELEPHONE (OUTPATIENT)
Age: 74
End: 2024-09-04

## 2024-09-04 RX ORDER — ESCITALOPRAM OXALATE 20 MG/1
20 TABLET ORAL DAILY
Qty: 90 TABLET | Refills: 3 | Status: SHIPPED | OUTPATIENT
Start: 2024-09-04

## 2024-09-04 NOTE — TELEPHONE ENCOUNTER
escitalopram (LEXAPRO) 20 MG tablet    Pt needs refill on this dosage only please      Pt has to have a 90 day for insurance purposes.    Refill to [facility] #[phone]       (This may be duplicate/pt calling about something else then wanted to leave this request only)  I believe there is already a request in now that I look further.

## 2024-09-05 DIAGNOSIS — F33.0 MAJOR DEPRESSIVE DISORDER, RECURRENT, MILD (HCC): ICD-10-CM

## 2024-09-05 RX ORDER — ESCITALOPRAM OXALATE 10 MG/1
10 TABLET ORAL DAILY
Qty: 90 TABLET | Refills: 3 | Status: SHIPPED | OUTPATIENT
Start: 2024-09-05

## 2024-11-21 ENCOUNTER — OFFICE VISIT (OUTPATIENT)
Age: 74
End: 2024-11-21
Payer: MEDICARE

## 2024-11-21 VITALS
TEMPERATURE: 97.2 F | RESPIRATION RATE: 20 BRPM | WEIGHT: 293 LBS | HEART RATE: 59 BPM | HEIGHT: 66 IN | DIASTOLIC BLOOD PRESSURE: 79 MMHG | OXYGEN SATURATION: 94 % | SYSTOLIC BLOOD PRESSURE: 136 MMHG | BODY MASS INDEX: 47.09 KG/M2

## 2024-11-21 DIAGNOSIS — Z23 NEEDS FLU SHOT: ICD-10-CM

## 2024-11-21 DIAGNOSIS — I89.0 LYMPHEDEMA, NOT ELSEWHERE CLASSIFIED: ICD-10-CM

## 2024-11-21 DIAGNOSIS — N18.31 STAGE 3A CHRONIC KIDNEY DISEASE (HCC): ICD-10-CM

## 2024-11-21 DIAGNOSIS — E83.52 HYPERCALCEMIA: ICD-10-CM

## 2024-11-21 DIAGNOSIS — G47.33 OSA ON CPAP: ICD-10-CM

## 2024-11-21 DIAGNOSIS — F42.9 OBSESSIVE-COMPULSIVE DISORDER, UNSPECIFIED TYPE: ICD-10-CM

## 2024-11-21 DIAGNOSIS — G47.00 INSOMNIA, UNSPECIFIED TYPE: Primary | ICD-10-CM

## 2024-11-21 PROCEDURE — 1160F RVW MEDS BY RX/DR IN RCRD: CPT | Performed by: INTERNAL MEDICINE

## 2024-11-21 PROCEDURE — 99214 OFFICE O/P EST MOD 30 MIN: CPT | Performed by: INTERNAL MEDICINE

## 2024-11-21 PROCEDURE — 3075F SYST BP GE 130 - 139MM HG: CPT | Performed by: INTERNAL MEDICINE

## 2024-11-21 PROCEDURE — 3078F DIAST BP <80 MM HG: CPT | Performed by: INTERNAL MEDICINE

## 2024-11-21 PROCEDURE — G8399 PT W/DXA RESULTS DOCUMENT: HCPCS | Performed by: INTERNAL MEDICINE

## 2024-11-21 PROCEDURE — G8427 DOCREV CUR MEDS BY ELIG CLIN: HCPCS | Performed by: INTERNAL MEDICINE

## 2024-11-21 PROCEDURE — G8482 FLU IMMUNIZE ORDER/ADMIN: HCPCS | Performed by: INTERNAL MEDICINE

## 2024-11-21 PROCEDURE — 1090F PRES/ABSN URINE INCON ASSESS: CPT | Performed by: INTERNAL MEDICINE

## 2024-11-21 PROCEDURE — G8417 CALC BMI ABV UP PARAM F/U: HCPCS | Performed by: INTERNAL MEDICINE

## 2024-11-21 PROCEDURE — 90653 IIV ADJUVANT VACCINE IM: CPT | Performed by: INTERNAL MEDICINE

## 2024-11-21 PROCEDURE — 3017F COLORECTAL CA SCREEN DOC REV: CPT | Performed by: INTERNAL MEDICINE

## 2024-11-21 PROCEDURE — 1159F MED LIST DOCD IN RCRD: CPT | Performed by: INTERNAL MEDICINE

## 2024-11-21 PROCEDURE — 1123F ACP DISCUSS/DSCN MKR DOCD: CPT | Performed by: INTERNAL MEDICINE

## 2024-11-21 PROCEDURE — PBSHW INFLUENZA, FLUAD TRIVALENT, (AGE 65 Y+), IM, PRESERVATIVE FREE, 0.5ML: Performed by: INTERNAL MEDICINE

## 2024-11-21 PROCEDURE — 1036F TOBACCO NON-USER: CPT | Performed by: INTERNAL MEDICINE
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
## 2024-11-23 LAB
ANION GAP SERPL CALC-SCNC: 3 MMOL/L (ref 2–12)
BUN SERPL-MCNC: 36 MG/DL (ref 6–20)
BUN/CREAT SERPL: 25 (ref 12–20)
CALCIUM SERPL-MCNC: 10.4 MG/DL (ref 8.5–10.1)
CALCIUM SERPL-MCNC: 10.8 MG/DL (ref 8.5–10.1)
CHLORIDE SERPL-SCNC: 106 MMOL/L (ref 97–108)
CO2 SERPL-SCNC: 32 MMOL/L (ref 21–32)
CREAT SERPL-MCNC: 1.45 MG/DL (ref 0.55–1.02)
GLUCOSE SERPL-MCNC: 113 MG/DL (ref 65–100)
POTASSIUM SERPL-SCNC: 4.9 MMOL/L (ref 3.5–5.1)
PTH-INTACT SERPL-MCNC: 44 PG/ML (ref 18.4–88)
SODIUM SERPL-SCNC: 141 MMOL/L (ref 136–145)

## 2024-11-25 NOTE — RESULT ENCOUNTER NOTE
Called pt; no answer    Able to leave a voice message to call clinic back at earliest convenience.

## 2024-11-25 NOTE — RESULT ENCOUNTER NOTE
Patient notified of results and response from Fausto Collado III, DO:    Pt verbalized understanding.

## 2024-12-09 RX ORDER — SIMVASTATIN 20 MG
TABLET ORAL
Qty: 90 TABLET | Refills: 3 | Status: SHIPPED | OUTPATIENT
Start: 2024-12-09

## 2024-12-10 DIAGNOSIS — G47.09 OTHER INSOMNIA: Primary | ICD-10-CM

## 2024-12-10 NOTE — TELEPHONE ENCOUNTER
Pt states that you had discussed her problem with sleeping.    She tried what was suggested and none of those things are working.     Pt is now asking that a script be called in for that as discussed.     Please call into [facility] #[phone]

## 2024-12-11 RX ORDER — LORAZEPAM 0.5 MG/1
0.5 TABLET ORAL NIGHTLY PRN
Qty: 30 TABLET | Refills: 2 | Status: SHIPPED | OUTPATIENT
Start: 2024-12-11 | End: 2025-06-09

## 2025-02-16 RX ORDER — HYDROCHLOROTHIAZIDE 12.5 MG/1
12.5 TABLET ORAL DAILY
Qty: 90 TABLET | Refills: 3 | Status: SHIPPED | OUTPATIENT
Start: 2025-02-16

## 2025-02-16 RX ORDER — METOPROLOL TARTRATE 50 MG
50 TABLET ORAL 2 TIMES DAILY
Qty: 180 TABLET | Refills: 3 | Status: SHIPPED | OUTPATIENT
Start: 2025-02-16

## 2025-02-25 DIAGNOSIS — I10 ESSENTIAL HYPERTENSION: Primary | ICD-10-CM

## 2025-02-25 RX ORDER — LISINOPRIL 20 MG/1
40 TABLET ORAL DAILY
Qty: 60 TABLET | Refills: 11 | Status: SHIPPED | OUTPATIENT
Start: 2025-02-25

## 2025-02-25 NOTE — TELEPHONE ENCOUNTER
PCP: Fausto Collado III, DO    Last appt: 11/21/2024  No future appointments.    Requested Prescriptions     Pending Prescriptions Disp Refills    lisinopril (PRINIVIL;ZESTRIL) 20 MG tablet 60 tablet 11     Sig: Take 2 tablets by mouth daily 2 TABLETS DAILY

## 2025-06-05 DIAGNOSIS — I10 ESSENTIAL HYPERTENSION: ICD-10-CM

## 2025-06-05 NOTE — TELEPHONE ENCOUNTER
Patient requesting a 90 day supply of lisinopril (PRINIVIL;ZESTRIL) 20 MG tablet [[id number]]     Patient states has two days left of the medication.    Patient confirmed pharmacy.

## 2025-06-06 RX ORDER — LISINOPRIL 20 MG/1
40 TABLET ORAL DAILY
Qty: 180 TABLET | Refills: 3 | Status: SHIPPED | OUTPATIENT
Start: 2025-06-06

## 2025-06-06 NOTE — TELEPHONE ENCOUNTER
Future Appointments:  No future appointments.     Last Appointment With Me:  11/21/2024     Requested Prescriptions     Pending Prescriptions Disp Refills    lisinopril (PRINIVIL;ZESTRIL) 20 MG tablet 180 tablet 1     Sig: Take 2 tablets by mouth daily

## 2025-07-07 ENCOUNTER — TELEPHONE (OUTPATIENT)
Age: 75
End: 2025-07-07

## 2025-07-07 NOTE — TELEPHONE ENCOUNTER
----- Message from [place] BARCENAS sent at 7/7/2025  2:09 PM EDT -----  Regarding: [facility] Appointment Request  [facility] Appointment Request    Patient needs appointment for [facility] Appointment Type: Annual Visit.    Patient Requested Dates(s): November  Patient Requested Time: Afternoon  Provider Name: Fausto Collado III, DO      Reason for Appointment Request: Established Patient - Available appointments did not meet patient need    Patient called in to request a appointment for her Annual Visit last annual visit was last 7/1/2024      --------------------------------------------------------------------------------------------------------------------------    Relationship to Patient: Self     Call Back Information: OK to leave message on dynaTrace software  Preferred Call Back Number: Phone : [phone]